# Patient Record
Sex: FEMALE | Race: WHITE | NOT HISPANIC OR LATINO | Employment: OTHER | URBAN - METROPOLITAN AREA
[De-identification: names, ages, dates, MRNs, and addresses within clinical notes are randomized per-mention and may not be internally consistent; named-entity substitution may affect disease eponyms.]

---

## 2024-06-19 ENCOUNTER — APPOINTMENT (OUTPATIENT)
Dept: CT IMAGING | Facility: HOSPITAL | Age: 89
DRG: 312 | End: 2024-06-19
Payer: MEDICARE

## 2024-06-19 ENCOUNTER — HOSPITAL ENCOUNTER (INPATIENT)
Facility: HOSPITAL | Age: 89
LOS: 1 days | Discharge: HOME HEALTH | DRG: 312 | End: 2024-06-22
Attending: EMERGENCY MEDICINE | Admitting: INTERNAL MEDICINE
Payer: MEDICARE

## 2024-06-19 DIAGNOSIS — Z86.59 HISTORY OF DEMENTIA: ICD-10-CM

## 2024-06-19 DIAGNOSIS — F43.10 PTSD (POST-TRAUMATIC STRESS DISORDER): ICD-10-CM

## 2024-06-19 DIAGNOSIS — Z79.01 CHRONIC ANTICOAGULATION: ICD-10-CM

## 2024-06-19 DIAGNOSIS — R55 SYNCOPE, UNSPECIFIED SYNCOPE TYPE: Primary | ICD-10-CM

## 2024-06-19 DIAGNOSIS — R06.83 SNORING: ICD-10-CM

## 2024-06-19 DIAGNOSIS — F60.81 NARCISSISTIC PERSONALITY DISORDER: ICD-10-CM

## 2024-06-19 DIAGNOSIS — Z86.79 HISTORY OF HYPERTENSION: ICD-10-CM

## 2024-06-19 DIAGNOSIS — R68.89 FORGETFULNESS: ICD-10-CM

## 2024-06-19 DIAGNOSIS — Z86.79 HISTORY OF ATRIAL FIBRILLATION: ICD-10-CM

## 2024-06-19 LAB
ALBUMIN SERPL-MCNC: 4 G/DL (ref 3.5–5.2)
ALBUMIN/GLOB SERPL: 1.6 G/DL
ALP SERPL-CCNC: 66 U/L (ref 39–117)
ALT SERPL W P-5'-P-CCNC: 14 U/L (ref 1–33)
ANION GAP SERPL CALCULATED.3IONS-SCNC: 11 MMOL/L (ref 5–15)
AST SERPL-CCNC: 16 U/L (ref 1–32)
BASOPHILS # BLD AUTO: 0.02 10*3/MM3 (ref 0–0.2)
BASOPHILS NFR BLD AUTO: 0.3 % (ref 0–1.5)
BILIRUB SERPL-MCNC: 0.3 MG/DL (ref 0–1.2)
BUN SERPL-MCNC: 30 MG/DL (ref 8–23)
BUN/CREAT SERPL: 35.7 (ref 7–25)
CALCIUM SPEC-SCNC: 9.3 MG/DL (ref 8.6–10.5)
CHLORIDE SERPL-SCNC: 102 MMOL/L (ref 98–107)
CO2 SERPL-SCNC: 26 MMOL/L (ref 22–29)
CREAT SERPL-MCNC: 0.84 MG/DL (ref 0.57–1)
D DIMER PPP FEU-MCNC: <0.27 MCGFEU/ML (ref 0–0.86)
D-LACTATE SERPL-SCNC: 1.8 MMOL/L (ref 0.5–2)
DEPRECATED RDW RBC AUTO: 47.8 FL (ref 37–54)
EGFRCR SERPLBLD CKD-EPI 2021: 67.8 ML/MIN/1.73
EOSINOPHIL # BLD AUTO: 0.06 10*3/MM3 (ref 0–0.4)
EOSINOPHIL NFR BLD AUTO: 0.8 % (ref 0.3–6.2)
ERYTHROCYTE [DISTWIDTH] IN BLOOD BY AUTOMATED COUNT: 13 % (ref 12.3–15.4)
GLOBULIN UR ELPH-MCNC: 2.5 GM/DL
GLUCOSE SERPL-MCNC: 147 MG/DL (ref 65–99)
HCT VFR BLD AUTO: 39.5 % (ref 34–46.6)
HGB BLD-MCNC: 12.8 G/DL (ref 12–15.9)
HOLD SPECIMEN: NORMAL
IMM GRANULOCYTES # BLD AUTO: 0.01 10*3/MM3 (ref 0–0.05)
IMM GRANULOCYTES NFR BLD AUTO: 0.1 % (ref 0–0.5)
LYMPHOCYTES # BLD AUTO: 0.88 10*3/MM3 (ref 0.7–3.1)
LYMPHOCYTES NFR BLD AUTO: 12.1 % (ref 19.6–45.3)
MAGNESIUM SERPL-MCNC: 2.1 MG/DL (ref 1.6–2.4)
MCH RBC QN AUTO: 32.2 PG (ref 26.6–33)
MCHC RBC AUTO-ENTMCNC: 32.4 G/DL (ref 31.5–35.7)
MCV RBC AUTO: 99.2 FL (ref 79–97)
MONOCYTES # BLD AUTO: 0.51 10*3/MM3 (ref 0.1–0.9)
MONOCYTES NFR BLD AUTO: 7 % (ref 5–12)
NEUTROPHILS NFR BLD AUTO: 5.81 10*3/MM3 (ref 1.7–7)
NEUTROPHILS NFR BLD AUTO: 79.7 % (ref 42.7–76)
NRBC BLD AUTO-RTO: 0 /100 WBC (ref 0–0.2)
PLATELET # BLD AUTO: 173 10*3/MM3 (ref 140–450)
PMV BLD AUTO: 10 FL (ref 6–12)
POTASSIUM SERPL-SCNC: 4.5 MMOL/L (ref 3.5–5.2)
PROT SERPL-MCNC: 6.5 G/DL (ref 6–8.5)
RBC # BLD AUTO: 3.98 10*6/MM3 (ref 3.77–5.28)
SODIUM SERPL-SCNC: 139 MMOL/L (ref 136–145)
TROPONIN T SERPL HS-MCNC: 16 NG/L
TROPONIN T SERPL HS-MCNC: 19 NG/L
WBC NRBC COR # BLD AUTO: 7.29 10*3/MM3 (ref 3.4–10.8)
WHOLE BLOOD HOLD COAG: NORMAL
WHOLE BLOOD HOLD SPECIMEN: NORMAL

## 2024-06-19 PROCEDURE — 83735 ASSAY OF MAGNESIUM: CPT | Performed by: EMERGENCY MEDICINE

## 2024-06-19 PROCEDURE — 83605 ASSAY OF LACTIC ACID: CPT | Performed by: PHYSICIAN ASSISTANT

## 2024-06-19 PROCEDURE — 93005 ELECTROCARDIOGRAM TRACING: CPT | Performed by: EMERGENCY MEDICINE

## 2024-06-19 PROCEDURE — 36415 COLL VENOUS BLD VENIPUNCTURE: CPT

## 2024-06-19 PROCEDURE — 85379 FIBRIN DEGRADATION QUANT: CPT | Performed by: PHYSICIAN ASSISTANT

## 2024-06-19 PROCEDURE — 85025 COMPLETE CBC W/AUTO DIFF WBC: CPT | Performed by: EMERGENCY MEDICINE

## 2024-06-19 PROCEDURE — 82550 ASSAY OF CK (CPK): CPT | Performed by: NURSE PRACTITIONER

## 2024-06-19 PROCEDURE — 83036 HEMOGLOBIN GLYCOSYLATED A1C: CPT | Performed by: NURSE PRACTITIONER

## 2024-06-19 PROCEDURE — 84484 ASSAY OF TROPONIN QUANT: CPT | Performed by: PHYSICIAN ASSISTANT

## 2024-06-19 PROCEDURE — 25810000003 SODIUM CHLORIDE 0.9 % SOLUTION: Performed by: PHYSICIAN ASSISTANT

## 2024-06-19 PROCEDURE — 93005 ELECTROCARDIOGRAM TRACING: CPT | Performed by: PHYSICIAN ASSISTANT

## 2024-06-19 PROCEDURE — 99285 EMERGENCY DEPT VISIT HI MDM: CPT

## 2024-06-19 PROCEDURE — 84443 ASSAY THYROID STIM HORMONE: CPT | Performed by: NURSE PRACTITIONER

## 2024-06-19 PROCEDURE — 70450 CT HEAD/BRAIN W/O DYE: CPT

## 2024-06-19 PROCEDURE — 84484 ASSAY OF TROPONIN QUANT: CPT | Performed by: EMERGENCY MEDICINE

## 2024-06-19 PROCEDURE — 80053 COMPREHEN METABOLIC PANEL: CPT | Performed by: EMERGENCY MEDICINE

## 2024-06-19 RX ORDER — SODIUM CHLORIDE 0.9 % (FLUSH) 0.9 %
10 SYRINGE (ML) INJECTION AS NEEDED
Status: DISCONTINUED | OUTPATIENT
Start: 2024-06-19 | End: 2024-06-22 | Stop reason: HOSPADM

## 2024-06-19 RX ADMIN — SODIUM CHLORIDE 1000 ML: 900 INJECTION, SOLUTION INTRAVENOUS at 19:35

## 2024-06-19 NOTE — ED PROVIDER NOTES
EMERGENCY DEPARTMENT ENCOUNTER    Pt Name: Billie Yu  MRN: [id number]  Pt :   1935  Room Number:  S312/1  Date of encounter:  2024  PCP: Provider, No Known  ED Provider: SHEA Pagan    Historian: Patient    HPI:  Chief Complaint: Syncope    Context: Billie Yu is a 88 y.o. female who presents to the ED c/o a syncopal episode.  Patient reports that she was enjoying a nice dinner this evening and experienced a syncopal episode.  Son was present at bedside reports that he was eating dinner with his mother when he looked over and she began shaking.  As he got up patient started to look as if she was going to pass out and he was able to get her and eased her down to the floor.  He notes that she passed out for approximately 5 minutes.  Patient denies any recent symptoms other than feeling tired lately.  She has just recently moved here from [place] and has been working to try and get her house set up.  Patient lives by herself.  Patient has history of dementia in addition to atrial fibrillation.  Son states that he is unsure that she has been compliant with her medications as a result of her dementia.  Son reports that a similar situation happened approximately 2 weeks prior when it was found that patient was in atrial fibrillation.  On interview and exam patient without complaints.  HPI     REVIEW OF SYSTEMS  A chief complaint appropriate review of systems was completed and is negative except as noted in the HPI.     PAST MEDICAL HISTORY  History reviewed. No pertinent past medical history.    PAST SURGICAL HISTORY  History reviewed. No pertinent surgical history.    FAMILY HISTORY  History reviewed. No pertinent family history.    SOCIAL HISTORY  Social History     Socioeconomic History    Marital status:    Tobacco Use    Smoking status: Never    Smokeless tobacco: Never   Vaping Use    Vaping status: Never Used   Substance and Sexual Activity    Drug use: Never      ALLERGIES  Patient has no known allergies.    PHYSICAL EXAM  Physical Exam  GENERAL:   Appears in no acute distress.  HENT: Nares patent.  EYES: No scleral icterus.  CV: Regular rhythm, regular rate.  RESPIRATORY: Normal effort.  No audible wheezes, rales or rhonchi.  ABDOMEN: Soft, nontender  MUSCULOSKELETAL: No deformities.   NEURO: Alert, moves all extremities, follows commands. No focal deficits.   SKIN: Warm, dry, no rash visualized.  PSYCH: At baseline   I have reviewed the triage vital signs and nursing notes.     LAB RESULTS  Results for orders placed or performed during the hospital encounter of 06/19/24   Comprehensive Metabolic Panel    Specimen: Blood   Result Value Ref Range    Glucose 147 (H) 65 - 99 mg/dL    BUN 30 (H) 8 - 23 mg/dL    Creatinine 0.84 0.57 - 1.00 mg/dL    Sodium 139 136 - 145 mmol/L    Potassium 4.5 3.5 - 5.2 mmol/L    Chloride 102 98 - 107 mmol/L    CO2 26.0 22.0 - 29.0 mmol/L    Calcium 9.3 8.6 - 10.5 mg/dL    Total Protein 6.5 6.0 - 8.5 g/dL    Albumin 4.0 3.5 - 5.2 g/dL    ALT (SGPT) 14 1 - 33 U/L    AST (SGOT) 16 1 - 32 U/L    Alkaline Phosphatase 66 39 - 117 U/L    Total Bilirubin 0.3 0.0 - 1.2 mg/dL    Globulin 2.5 gm/dL    A/G Ratio 1.6 g/dL    BUN/Creatinine Ratio 35.7 (H) 7.0 - 25.0    Anion Gap 11.0 5.0 - 15.0 mmol/L    eGFR 67.8 >60.0 mL/min/1.73   Magnesium    Specimen: Blood   Result Value Ref Range    Magnesium 2.1 1.6 - 2.4 mg/dL   Single High Sensitivity Troponin T    Specimen: Blood   Result Value Ref Range    HS Troponin T 19 (H) <14 ng/L   CBC Auto Differential    Specimen: Blood   Result Value Ref Range    WBC 7.29 3.40 - 10.80 10*3/mm3    RBC 3.98 3.77 - 5.28 10*6/mm3    Hemoglobin 12.8 12.0 - 15.9 g/dL    Hematocrit 39.5 34.0 - 46.6 %    MCV 99.2 (H) 79.0 - 97.0 fL    MCH 32.2 26.6 - 33.0 pg    MCHC 32.4 31.5 - 35.7 g/dL    RDW 13.0 12.3 - 15.4 %    RDW-SD 47.8 37.0 - 54.0 fl    MPV 10.0 6.0 - 12.0 fL    Platelets 173 140 - 450 10*3/mm3    Neutrophil % 79.7  (H) 42.7 - 76.0 %    Lymphocyte % 12.1 (L) 19.6 - 45.3 %    Monocyte % 7.0 5.0 - 12.0 %    Eosinophil % 0.8 0.3 - 6.2 %    Basophil % 0.3 0.0 - 1.5 %    Immature Grans % 0.1 0.0 - 0.5 %    Neutrophils, Absolute 5.81 1.70 - 7.00 10*3/mm3    Lymphocytes, Absolute 0.88 0.70 - 3.10 10*3/mm3    Monocytes, Absolute 0.51 0.10 - 0.90 10*3/mm3    Eosinophils, Absolute 0.06 0.00 - 0.40 10*3/mm3    Basophils, Absolute 0.02 0.00 - 0.20 10*3/mm3    Immature Grans, Absolute 0.01 0.00 - 0.05 10*3/mm3    nRBC 0.0 0.0 - 0.2 /100 WBC   D-dimer, Quantitative    Specimen: Blood   Result Value Ref Range    D-Dimer, Quantitative <0.27 0.00 - 0.86 MCGFEU/mL   Lactic Acid, Plasma    Specimen: Blood   Result Value Ref Range    Lactate 1.8 0.5 - 2.0 mmol/L   Single High Sensitivity Troponin T    Specimen: Blood   Result Value Ref Range    HS Troponin T 16 (H) <14 ng/L   CK    Specimen: Blood   Result Value Ref Range    Creatine Kinase 80 20 - 180 U/L   TSH Rfx On Abnormal To Free T4    Specimen: Blood   Result Value Ref Range    TSH 1.150 0.270 - 4.200 uIU/mL   ECG 12 Lead ED Triage Standing Order; Syncope   Result Value Ref Range    QT Interval 426 ms    QTC Interval 469 ms   ECG 12 Lead Syncope   Result Value Ref Range    QT Interval 450 ms    QTC Interval 492 ms   Green Top (Gel)   Result Value Ref Range    Extra Tube Hold for add-ons.    Lavender Top   Result Value Ref Range    Extra Tube hold for add-on    Gold Top - SST   Result Value Ref Range    Extra Tube Hold for add-ons.    Gray Top   Result Value Ref Range    Extra Tube Hold for add-ons.    Light Blue Top   Result Value Ref Range    Extra Tube Hold for add-ons.        If labs were ordered, I independently reviewed the results and considered them in treating the patient.    RADIOLOGY  CT Head Without Contrast   Final Result   Impression:      No acute intracranial process evident.      Age-related involutional changes and findings compatible with changes of chronic microvascular  ischemia.            Electronically Signed: Dmitry Patricio MD     6/19/2024 8:17 PM EDT     Workstation ID: [id number]      MRI Brain With & Without Contrast    (Results Pending)     [x] Radiologist's Report Reviewed:  I ordered and independently interpreted the above noted radiographic studies.  See radiologist's dictation for official interpretation.      PROCEDURES    Procedures    ECG 12 Lead Syncope   Preliminary Result   Test Reason : Syncope   Blood Pressure :   */*   mmHG   Vent. Rate :  72 BPM     Atrial Rate :  72 BPM      P-R Int : 164 ms          QRS Dur : 122 ms       QT Int : 450 ms       P-R-T Axes :  62 -42  24 degrees      QTc Int : 492 ms      Normal sinus rhythm   Possible Left atrial enlargement   Left axis deviation   Left ventricular hypertrophy with QRS widening   Nonspecific ST and T wave abnormality   Abnormal ECG   When compared with ECG of 19-JUN-2024 19:05, (Unconfirmed)   Minimal criteria for Septal infarct are no longer present      Referred By: edmd           Confirmed By:       ECG 12 Lead ED Triage Standing Order; Syncope   Preliminary Result   Test Reason : ED Triage Standing Order~   Blood Pressure :   */*   mmHG   Vent. Rate :  73 BPM     Atrial Rate :  73 BPM      P-R Int : 164 ms          QRS Dur : 118 ms       QT Int : 426 ms       P-R-T Axes :  63 -47  49 degrees      QTc Int : 469 ms      Normal sinus rhythm   Possible Left atrial enlargement   Left anterior fascicular block   Left ventricular hypertrophy with QRS widening   Cannot rule out Septal infarct , age undetermined   Abnormal ECG   No previous ECGs available      Referred By: edmd           Confirmed By:           MEDICATIONS GIVEN IN ER    Medications   sodium chloride 0.9 % flush 10 mL (has no administration in time range)   amiodarone (PACERONE) tablet 200 mg (has no administration in time range)   apixaban (ELIQUIS) tablet 2.5 mg (has no administration in time range)   sodium chloride 0.9 % flush 10 mL (has no  administration in time range)   sodium chloride 0.9 % flush 10 mL (has no administration in time range)   sodium chloride 0.9 % infusion 40 mL (has no administration in time range)   nitroglycerin (NITROSTAT) SL tablet 0.4 mg (has no administration in time range)   acetaminophen (TYLENOL) tablet 650 mg (has no administration in time range)     Or   acetaminophen (TYLENOL) suppository 650 mg (has no administration in time range)   sennosides-docusate (PERICOLACE) 8.6-50 MG per tablet 2 tablet (has no administration in time range)     And   polyethylene glycol (MIRALAX) packet 17 g (has no administration in time range)     And   bisacodyl (DULCOLAX) EC tablet 5 mg (has no administration in time range)     And   bisacodyl (DULCOLAX) suppository 10 mg (has no administration in time range)   melatonin tablet 5 mg (has no administration in time range)   ondansetron ODT (ZOFRAN-ODT) disintegrating tablet 4 mg (has no administration in time range)     Or   ondansetron (ZOFRAN) injection 4 mg (has no administration in time range)   famotidine (PEPCID) tablet 40 mg (has no administration in time range)   sodium chloride 0.9 % bolus 1,000 mL (0 mL Intravenous Stopped 6/19/24 2049)       MEDICAL DECISION MAKING, PROGRESS, and CONSULTS   Medical Decision Making  88-year-old nontoxic-appearing female presents the ED following syncopal episode.  History of multiple syncopal episodes over the last year.  Recently moved to this area from [place].  Reported history of hypertension, dementia and atrial fibrillation anticoagulated on Eliquis.  No acute emergent findings demonstrated on physical exam.  Labs without acute abnormalities.  EKG without evidence of acute findings.  CT head without evidence of acute intracranial abnormalities.  Patient admitted to hospital medicine for further evaluation and treatment of her syncopal episodes.    Problems Addressed:  Syncope, unspecified syncope type: complicated acute illness or  injury    Amount and/or Complexity of Data Reviewed  Independent Historian: caregiver     Details: Son who is medical and legal power of  at bedside contributory to HPI and review of systems  Labs: ordered. Decision-making details documented in ED Course.  Radiology: ordered and independent interpretation performed. Decision-making details documented in ED Course.  ECG/medicine tests: ordered and independent interpretation performed. Decision-making details documented in ED Course.    Risk  Prescription drug management.  Decision regarding hospitalization.      All labs have been independently reviewed by me.  All radiology studies have been interpreted by me and the radiologist dictating the report.  All EKG's have been independently interpreted by me as well as and overseeing attending physician.    [] Discussed with radiology regarding test interpretation:    Discussion below represents my analysis of pertinent findings related to patient's condition, differential diagnosis, treatment plan and final disposition.    Differential diagnosis:  The differential diagnosis associated with the patient's presentation includes: Vasovagal syncope, orthostatic hypotension, hyperventilation, anemia, arrhythmias, hypoglycemia, seizure, PE, myocardial infarction, hypoxia, CVA, TIA.     Additional sources  Discussed/ obtained information from independent historians:   [] Spouse  [] Parent  [x] Family member  [] Friend  [] EMS   [] Other:  External (non-ED) record review:   [] Inpatient record:   [] Office record:   [] Outpatient record:   [] Prior Outpatient labs:   [] Prior Outpatient radiology:   [] Primary Care record:   [] Outside ED record:   [] Other:   Patient's care impacted by:   [] Diabetes  [x] Hypertension  [] Hyperlipidemia  [] Hypothyroidism   [] Coronary Artery Disease   [] COPD   [] Cancer   [] Obesity  [] GERD   [] Tobacco Abuse   [] Substance Abuse    [] Anxiety   [] Depression   [x] Other: History of  dementia, history atrial fibrillation, history of hypertension  Care significantly affected by Social Determinants of Health (housing and economic circumstances, unemployment)    [] Yes     [x] No   If yes, Patient's care significantly limited by  Social Determinants of Health including:   [] Inadequate housing   [] Low income   [] Alcoholism and drug addiction in family   [] Problems related to primary support group   [] Unemployment   [] Problems related to employment   [] Other Social Determinants of Health:     Shared decision making:  I reviewed workup performed in ED including labs and imaging. Based on findings, recommendation made for admission. Patient is agreeable to plan of care and hospital admission.      Orders placed during this visit:  Orders Placed This Encounter   Procedures    Respiratory Panel PCR w/COVID-19(SARS-CoV-2) ANN/[facility] In-House, NP Swab in UTM/VTM, 2 HR TAT - Swab, Nasopharynx    CT Head Without Contrast    MRI Brain With & Without Contrast    [place] Draw    Comprehensive Metabolic Panel    Magnesium    Single High Sensitivity Troponin T    CBC Auto Differential    D-dimer, Quantitative    Lactic Acid, Plasma    Single High Sensitivity Troponin T    CK    Basic Metabolic Panel    CBC Auto Differential    Phosphorus    TSH Rfx On Abnormal To Free T4    Hemoglobin A1c    Urine Drug Screen - Urine, Clean Catch    Urinalysis With Culture If Indicated -    Diet: Cardiac; Healthy Heart (2-3 Na+); Fluid Consistency: Thin (IDDSI 0)    Undress & Gown    Vital Signs    Orthostatic Blood Pressure    Intake & Output    Weigh Patient    Maintain IV Access    Telemetry - Place Orders & Notify Provider of Results When Patient Experiences Acute Chest Pain, Dysrhythmia or Respiratory Distress    May Be Off Telemetry for Tests    Vital Signs    Orthostatic Blood Pressure    Continuous Pulse Oximetry    Up With Assistance    Daily Weights    Strict Intake & Output    Neuro Checks    Follow  Standard Precautions    Oral Care    Code Status and Medical Interventions:    Inpatient Neurology Consult General    Inpatient Cardiology Consult    OT Consult: Eval & Treat Discharge Placement Assessment, ADL Performance Below Baseline    PT Consult: Eval & Treat Functional Mobility Below Baseline, Discharge Placement Assessment    Oxygen Therapy- Nasal Cannula; Titrate 1-6 LPM Per SpO2; 90 - 95%    Incentive Spirometry    Oxygen Therapy- Nasal Cannula; Titrate 1-6 LPM Per SpO2; 90 - 95%    ECG 12 Lead ED Triage Standing Order; Syncope    ECG 12 Lead Syncope    Adult Transthoracic Echo Complete With Contrast if Necessary Per Protocol    Insert Peripheral IV    Insert Peripheral IV    Initiate Observation Status    ED Bed Request    Seizure Precautions    Fall Precautions    Aspiration Precautions    CBC & Differential    Green Top (Gel)    Lavender Top    Gold Top - SST    Gray Top    Light Blue Top     ED Course:    ED Course as of 06/20/24 0222 Wed Jun 19, 2024 1931 Vitals and Telemetry tracing was reviewed and directly interpreted by myself demonstrating blood sugar 157/72, afebrile, heart rate of 72, respirations 18 breaths/min and oxygen saturation 93% on room air [JG]   1931 BP: 157/72 [JG]   1931 Temp: 97.8 °F (36.6 °C) [JG]   1931 Heart Rate: 72 [JG]   1931 Resp: 18 [JG]   1931 SpO2: 93 % [JG]   1932 EKG personally interpreted by myself in addition to interpretation by attending without evidence of acute ischemic changes; normal sinus rhythm [JG]   2038 CT head personally interpreted by myself and with official read provided by radiology demonstrates no acute intracranial abnormalities [JG]   2135 Orthostatic vital signs personally interpreted by myself; patient is not orthostatic [JG]   2203 Hospital medicine Dr. Guzman messaged for admission [JG]   2225 [facility] medicine, Dr. Guzman agreeable to accept patient for admission [JG]      ED Course User Index  [JG] Almas Larios, PA           DIAGNOSIS  Final diagnoses:   Syncope, unspecified syncope type   History of atrial fibrillation   History of hypertension   History of dementia     DISPOSITION    ED Disposition       ED Disposition   Decision to Admit    Condition   --    Comment   Level of Care: Telemetry [5]   Diagnosis: Syncope [323042]   Admitting Physician: MARIAN CHAPMAN [[id number]]   Attending Physician: MARIAN CHAPMAN [[id number]]                 Please note that portions of this document were completed with voice recognition software.        Almas Larios, PA  06/20/24 0222

## 2024-06-19 NOTE — Clinical Note
Level of Care: Telemetry [5]   Diagnosis: Syncope [206001]   Admitting Physician: MARIAN CHAPMAN [[id number]]   Attending Physician: MARIAN CHAPMAN [[id number]]

## 2024-06-20 ENCOUNTER — APPOINTMENT (OUTPATIENT)
Dept: NEUROLOGY | Facility: HOSPITAL | Age: 89
DRG: 312 | End: 2024-06-20
Payer: MEDICARE

## 2024-06-20 ENCOUNTER — APPOINTMENT (OUTPATIENT)
Dept: MRI IMAGING | Facility: HOSPITAL | Age: 89
DRG: 312 | End: 2024-06-20
Payer: MEDICARE

## 2024-06-20 ENCOUNTER — APPOINTMENT (OUTPATIENT)
Dept: CARDIOLOGY | Facility: HOSPITAL | Age: 89
DRG: 312 | End: 2024-06-20
Payer: MEDICARE

## 2024-06-20 PROBLEM — F43.10 PTSD (POST-TRAUMATIC STRESS DISORDER): Status: ACTIVE | Noted: 2024-06-20

## 2024-06-20 PROBLEM — R06.83 SNORING: Status: ACTIVE | Noted: 2024-06-20

## 2024-06-20 PROBLEM — R68.89 FORGETFULNESS: Status: ACTIVE | Noted: 2024-06-20

## 2024-06-20 PROBLEM — F60.81 NARCISSISTIC PERSONALITY DISORDER: Status: ACTIVE | Noted: 2024-06-20

## 2024-06-20 PROBLEM — R55 SYNCOPE: Status: ACTIVE | Noted: 2024-06-20

## 2024-06-20 PROBLEM — I48.91 A-FIB: Status: ACTIVE | Noted: 2024-06-20

## 2024-06-20 PROBLEM — Z79.01 CHRONIC ANTICOAGULATION: Status: ACTIVE | Noted: 2024-06-20

## 2024-06-20 LAB
ANION GAP SERPL CALCULATED.3IONS-SCNC: 8 MMOL/L (ref 5–15)
B PARAPERT DNA SPEC QL NAA+PROBE: NOT DETECTED
B PERT DNA SPEC QL NAA+PROBE: NOT DETECTED
BASOPHILS # BLD AUTO: 0.03 10*3/MM3 (ref 0–0.2)
BASOPHILS NFR BLD AUTO: 0.5 % (ref 0–1.5)
BILIRUB UR QL STRIP: NEGATIVE
BUN SERPL-MCNC: 26 MG/DL (ref 8–23)
BUN/CREAT SERPL: 43.3 (ref 7–25)
C PNEUM DNA NPH QL NAA+NON-PROBE: NOT DETECTED
CALCIUM SPEC-SCNC: 8.4 MG/DL (ref 8.6–10.5)
CHLORIDE SERPL-SCNC: 106 MMOL/L (ref 98–107)
CK SERPL-CCNC: 80 U/L (ref 20–180)
CLARITY UR: CLEAR
CO2 SERPL-SCNC: 27 MMOL/L (ref 22–29)
COLOR UR: YELLOW
CREAT SERPL-MCNC: 0.6 MG/DL (ref 0.57–1)
DEPRECATED RDW RBC AUTO: 46.8 FL (ref 37–54)
EGFRCR SERPLBLD CKD-EPI 2021: 87.5 ML/MIN/1.73
EOSINOPHIL # BLD AUTO: 0.08 10*3/MM3 (ref 0–0.4)
EOSINOPHIL NFR BLD AUTO: 1.5 % (ref 0.3–6.2)
ERYTHROCYTE [DISTWIDTH] IN BLOOD BY AUTOMATED COUNT: 13 % (ref 12.3–15.4)
FLUAV SUBTYP SPEC NAA+PROBE: NOT DETECTED
FLUBV RNA ISLT QL NAA+PROBE: NOT DETECTED
GLUCOSE SERPL-MCNC: 92 MG/DL (ref 65–99)
GLUCOSE UR STRIP-MCNC: NEGATIVE MG/DL
HADV DNA SPEC NAA+PROBE: NOT DETECTED
HBA1C MFR BLD: 5.6 % (ref 4.8–5.6)
HCOV 229E RNA SPEC QL NAA+PROBE: NOT DETECTED
HCOV HKU1 RNA SPEC QL NAA+PROBE: NOT DETECTED
HCOV NL63 RNA SPEC QL NAA+PROBE: NOT DETECTED
HCOV OC43 RNA SPEC QL NAA+PROBE: NOT DETECTED
HCT VFR BLD AUTO: 33.5 % (ref 34–46.6)
HGB BLD-MCNC: 10.7 G/DL (ref 12–15.9)
HGB UR QL STRIP.AUTO: NEGATIVE
HMPV RNA NPH QL NAA+NON-PROBE: NOT DETECTED
HPIV1 RNA ISLT QL NAA+PROBE: NOT DETECTED
HPIV2 RNA SPEC QL NAA+PROBE: NOT DETECTED
HPIV3 RNA NPH QL NAA+PROBE: NOT DETECTED
HPIV4 P GENE NPH QL NAA+PROBE: NOT DETECTED
IMM GRANULOCYTES # BLD AUTO: 0.01 10*3/MM3 (ref 0–0.05)
IMM GRANULOCYTES NFR BLD AUTO: 0.2 % (ref 0–0.5)
KETONES UR QL STRIP: NEGATIVE
LEUKOCYTE ESTERASE UR QL STRIP.AUTO: NEGATIVE
LYMPHOCYTES # BLD AUTO: 0.85 10*3/MM3 (ref 0.7–3.1)
LYMPHOCYTES NFR BLD AUTO: 15.4 % (ref 19.6–45.3)
M PNEUMO IGG SER IA-ACNC: NOT DETECTED
MCH RBC QN AUTO: 31.3 PG (ref 26.6–33)
MCHC RBC AUTO-ENTMCNC: 31.9 G/DL (ref 31.5–35.7)
MCV RBC AUTO: 98 FL (ref 79–97)
MONOCYTES # BLD AUTO: 0.41 10*3/MM3 (ref 0.1–0.9)
MONOCYTES NFR BLD AUTO: 7.4 % (ref 5–12)
NEUTROPHILS NFR BLD AUTO: 4.13 10*3/MM3 (ref 1.7–7)
NEUTROPHILS NFR BLD AUTO: 75 % (ref 42.7–76)
NITRITE UR QL STRIP: NEGATIVE
NRBC BLD AUTO-RTO: 0 /100 WBC (ref 0–0.2)
PH UR STRIP.AUTO: 6.5 [PH] (ref 5–8)
PHOSPHATE SERPL-MCNC: 2.9 MG/DL (ref 2.5–4.5)
PLATELET # BLD AUTO: 170 10*3/MM3 (ref 140–450)
PMV BLD AUTO: 10.4 FL (ref 6–12)
POTASSIUM SERPL-SCNC: 4.4 MMOL/L (ref 3.5–5.2)
PROT UR QL STRIP: NEGATIVE
RBC # BLD AUTO: 3.42 10*6/MM3 (ref 3.77–5.28)
RHINOVIRUS RNA SPEC NAA+PROBE: NOT DETECTED
RSV RNA NPH QL NAA+NON-PROBE: NOT DETECTED
SARS-COV-2 RNA NPH QL NAA+NON-PROBE: NOT DETECTED
SODIUM SERPL-SCNC: 141 MMOL/L (ref 136–145)
SP GR UR STRIP: 1.02 (ref 1–1.03)
TSH SERPL DL<=0.05 MIU/L-ACNC: 1.15 UIU/ML (ref 0.27–4.2)
UROBILINOGEN UR QL STRIP: NORMAL
WBC NRBC COR # BLD AUTO: 5.51 10*3/MM3 (ref 3.4–10.8)

## 2024-06-20 PROCEDURE — 99232 SBSQ HOSP IP/OBS MODERATE 35: CPT | Performed by: INTERNAL MEDICINE

## 2024-06-20 PROCEDURE — 93010 ELECTROCARDIOGRAM REPORT: CPT | Performed by: INTERNAL MEDICINE

## 2024-06-20 PROCEDURE — 99204 OFFICE O/P NEW MOD 45 MIN: CPT | Performed by: INTERNAL MEDICINE

## 2024-06-20 PROCEDURE — 95713 VEEG 2-12 HR CONT MNTR: CPT

## 2024-06-20 PROCEDURE — 85025 COMPLETE CBC W/AUTO DIFF WBC: CPT | Performed by: NURSE PRACTITIONER

## 2024-06-20 PROCEDURE — 99222 1ST HOSP IP/OBS MODERATE 55: CPT | Performed by: NURSE PRACTITIONER

## 2024-06-20 PROCEDURE — G0378 HOSPITAL OBSERVATION PER HR: HCPCS

## 2024-06-20 PROCEDURE — 93306 TTE W/DOPPLER COMPLETE: CPT | Performed by: INTERNAL MEDICINE

## 2024-06-20 PROCEDURE — 99223 1ST HOSP IP/OBS HIGH 75: CPT | Performed by: PSYCHIATRY & NEUROLOGY

## 2024-06-20 PROCEDURE — 97166 OT EVAL MOD COMPLEX 45 MIN: CPT

## 2024-06-20 PROCEDURE — 81003 URINALYSIS AUTO W/O SCOPE: CPT | Performed by: NURSE PRACTITIONER

## 2024-06-20 PROCEDURE — 95813 EEG EXTND MNTR 61-119 MIN: CPT | Performed by: PSYCHIATRY & NEUROLOGY

## 2024-06-20 PROCEDURE — 95819 EEG AWAKE AND ASLEEP: CPT

## 2024-06-20 PROCEDURE — 97535 SELF CARE MNGMENT TRAINING: CPT

## 2024-06-20 PROCEDURE — 80048 BASIC METABOLIC PNL TOTAL CA: CPT | Performed by: NURSE PRACTITIONER

## 2024-06-20 PROCEDURE — 84100 ASSAY OF PHOSPHORUS: CPT | Performed by: NURSE PRACTITIONER

## 2024-06-20 PROCEDURE — 0202U NFCT DS 22 TRGT SARS-COV-2: CPT | Performed by: NURSE PRACTITIONER

## 2024-06-20 PROCEDURE — 97161 PT EVAL LOW COMPLEX 20 MIN: CPT

## 2024-06-20 PROCEDURE — 93005 ELECTROCARDIOGRAM TRACING: CPT | Performed by: INTERNAL MEDICINE

## 2024-06-20 PROCEDURE — 93306 TTE W/DOPPLER COMPLETE: CPT

## 2024-06-20 PROCEDURE — 95813 EEG EXTND MNTR 61-119 MIN: CPT

## 2024-06-20 RX ORDER — LISINOPRIL 10 MG/1
10 TABLET ORAL DAILY
Status: DISCONTINUED | OUTPATIENT
Start: 2024-06-20 | End: 2024-06-22 | Stop reason: HOSPADM

## 2024-06-20 RX ORDER — ACETAMINOPHEN 650 MG/1
650 SUPPOSITORY RECTAL EVERY 4 HOURS PRN
Status: DISCONTINUED | OUTPATIENT
Start: 2024-06-20 | End: 2024-06-22 | Stop reason: HOSPADM

## 2024-06-20 RX ORDER — UREA 10 %
5 LOTION (ML) TOPICAL NIGHTLY PRN
Status: DISCONTINUED | OUTPATIENT
Start: 2024-06-20 | End: 2024-06-22 | Stop reason: HOSPADM

## 2024-06-20 RX ORDER — AMIODARONE HYDROCHLORIDE 200 MG/1
200 TABLET ORAL DAILY
Status: DISCONTINUED | OUTPATIENT
Start: 2024-06-20 | End: 2024-06-22 | Stop reason: HOSPADM

## 2024-06-20 RX ORDER — NITROGLYCERIN 0.4 MG/1
0.4 TABLET SUBLINGUAL
Status: DISCONTINUED | OUTPATIENT
Start: 2024-06-20 | End: 2024-06-22 | Stop reason: HOSPADM

## 2024-06-20 RX ORDER — AMIODARONE HYDROCHLORIDE 200 MG/1
200 TABLET ORAL DAILY
COMMUNITY
Start: 2024-04-09

## 2024-06-20 RX ORDER — SODIUM CHLORIDE 0.9 % (FLUSH) 0.9 %
10 SYRINGE (ML) INJECTION AS NEEDED
Status: DISCONTINUED | OUTPATIENT
Start: 2024-06-20 | End: 2024-06-22 | Stop reason: HOSPADM

## 2024-06-20 RX ORDER — POLYETHYLENE GLYCOL 3350 17 G/17G
17 POWDER, FOR SOLUTION ORAL DAILY PRN
Status: DISCONTINUED | OUTPATIENT
Start: 2024-06-20 | End: 2024-06-22 | Stop reason: HOSPADM

## 2024-06-20 RX ORDER — ONDANSETRON 4 MG/1
4 TABLET, ORALLY DISINTEGRATING ORAL EVERY 6 HOURS PRN
Status: DISCONTINUED | OUTPATIENT
Start: 2024-06-20 | End: 2024-06-22 | Stop reason: HOSPADM

## 2024-06-20 RX ORDER — ACETAMINOPHEN 325 MG/1
650 TABLET ORAL EVERY 4 HOURS PRN
Status: DISCONTINUED | OUTPATIENT
Start: 2024-06-20 | End: 2024-06-22 | Stop reason: HOSPADM

## 2024-06-20 RX ORDER — FAMOTIDINE 20 MG/1
20 TABLET, FILM COATED ORAL DAILY
Status: DISCONTINUED | OUTPATIENT
Start: 2024-06-20 | End: 2024-06-22 | Stop reason: HOSPADM

## 2024-06-20 RX ORDER — SPIRONOLACTONE 25 MG/1
25 TABLET ORAL DAILY
COMMUNITY
Start: 2024-04-09

## 2024-06-20 RX ORDER — BISACODYL 5 MG/1
5 TABLET, DELAYED RELEASE ORAL DAILY PRN
Status: DISCONTINUED | OUTPATIENT
Start: 2024-06-20 | End: 2024-06-22 | Stop reason: HOSPADM

## 2024-06-20 RX ORDER — PREDNISOLONE ACETATE 10 MG/ML
1 SUSPENSION/ DROPS OPHTHALMIC 4 TIMES DAILY
COMMUNITY
Start: 2024-05-13 | End: 2024-06-28

## 2024-06-20 RX ORDER — FAMOTIDINE 20 MG/1
40 TABLET, FILM COATED ORAL DAILY
Status: DISCONTINUED | OUTPATIENT
Start: 2024-06-20 | End: 2024-06-20

## 2024-06-20 RX ORDER — BROMFENAC SODIUM 0.7 MG/ML
SOLUTION/ DROPS OPHTHALMIC
Status: ON HOLD | COMMUNITY
Start: 2024-03-15 | End: 2024-06-20

## 2024-06-20 RX ORDER — ONDANSETRON 2 MG/ML
4 INJECTION INTRAMUSCULAR; INTRAVENOUS EVERY 6 HOURS PRN
Status: DISCONTINUED | OUTPATIENT
Start: 2024-06-20 | End: 2024-06-22 | Stop reason: HOSPADM

## 2024-06-20 RX ORDER — BISACODYL 10 MG
10 SUPPOSITORY, RECTAL RECTAL DAILY PRN
Status: DISCONTINUED | OUTPATIENT
Start: 2024-06-20 | End: 2024-06-22 | Stop reason: HOSPADM

## 2024-06-20 RX ORDER — FUROSEMIDE 20 MG/1
20 TABLET ORAL DAILY
COMMUNITY
Start: 2024-04-10

## 2024-06-20 RX ORDER — APIXABAN 2.5 MG/1
2.5 TABLET, FILM COATED ORAL EVERY 12 HOURS SCHEDULED
COMMUNITY
Start: 2024-06-07

## 2024-06-20 RX ORDER — SODIUM CHLORIDE 9 MG/ML
40 INJECTION, SOLUTION INTRAVENOUS AS NEEDED
Status: DISCONTINUED | OUTPATIENT
Start: 2024-06-20 | End: 2024-06-22 | Stop reason: HOSPADM

## 2024-06-20 RX ORDER — LISINOPRIL 10 MG/1
10 TABLET ORAL DAILY
COMMUNITY

## 2024-06-20 RX ORDER — AMOXICILLIN 250 MG
2 CAPSULE ORAL 2 TIMES DAILY PRN
Status: DISCONTINUED | OUTPATIENT
Start: 2024-06-20 | End: 2024-06-22 | Stop reason: HOSPADM

## 2024-06-20 RX ORDER — SODIUM CHLORIDE 0.9 % (FLUSH) 0.9 %
10 SYRINGE (ML) INJECTION EVERY 12 HOURS SCHEDULED
Status: DISCONTINUED | OUTPATIENT
Start: 2024-06-20 | End: 2024-06-22 | Stop reason: HOSPADM

## 2024-06-20 RX ADMIN — Medication 10 ML: at 02:32

## 2024-06-20 RX ADMIN — APIXABAN 2.5 MG: 2.5 TABLET, FILM COATED ORAL at 19:43

## 2024-06-20 RX ADMIN — AMIODARONE HYDROCHLORIDE 200 MG: 200 TABLET ORAL at 09:15

## 2024-06-20 RX ADMIN — FAMOTIDINE 20 MG: 20 TABLET, FILM COATED ORAL at 09:15

## 2024-06-20 RX ADMIN — Medication 5 MG: at 19:43

## 2024-06-20 RX ADMIN — ACETAMINOPHEN 650 MG: 325 TABLET ORAL at 19:42

## 2024-06-20 RX ADMIN — APIXABAN 2.5 MG: 2.5 TABLET, FILM COATED ORAL at 09:15

## 2024-06-20 RX ADMIN — Medication 10 ML: at 09:15

## 2024-06-20 NOTE — PLAN OF CARE
Goal Outcome Evaluation:              Outcome Evaluation: Patient A&O x3 able to voice wants and needs to staff she has been able to answer all questions without any problems noted. She has had an EEG, ECHO and UA done today. Patient had a bowel movement today. Patient has been up to the restroom with assist x1 staff gait steady and no sign of dizziness. Has alarms on and in place with call light within reach.

## 2024-06-20 NOTE — ED NOTES
Billie Yu    Nursing Report ED to Floor:  Mental status: GCS 15  Ambulatory status: U@DEONDRE  Oxygen Therapy:  RA  Cardiac Rhythm: NSR  Admitted from: HOME  Safety Concerns:  NONE  Social Issues: NONE  ED Room #:  18    ED Nurse Phone Extension - [phone] or may call [phone].      HPI:   Chief Complaint   Patient presents with    Syncope       Past Medical History:  History reviewed. No pertinent past medical history.     Past Surgical History:  History reviewed. No pertinent surgical history.     Admitting Doctor:   Cuauhtemoc Guzman MD    Consulting Provider(s):  Consults       No orders found for last 30 day(s).             Admitting Diagnosis:   The encounter diagnosis was Syncope, unspecified syncope type.    Most Recent Vitals:   Vitals:    06/19/24 2125 06/19/24 2127 06/19/24 2134 06/20/24 0006   BP: 123/62 138/69 138/66    BP Location:       Patient Position: Lying Sitting Standing    Pulse: 78 81 75 68   Resp:       Temp:       TempSrc:       SpO2:    91%   Weight:       Height:           Active LDAs/IV Access:   Lines, Drains & Airways       Active LDAs       Name Placement date Placement time Site Days    Peripheral IV 06/19/24 Left Antecubital 06/19/24  --  Antecubital  1    Peripheral IV 06/19/24 1900 Anterior;Left Forearm 06/19/24  1900  Forearm  less than 1                    Labs (abnormal labs have a star):   Labs Reviewed   COMPREHENSIVE METABOLIC PANEL - Abnormal; Notable for the following components:       Result Value    Glucose 147 (*)     BUN 30 (*)     BUN/Creatinine Ratio 35.7 (*)     All other components within normal limits    Narrative:     GFR Normal >60  Chronic Kidney Disease <60  Kidney Failure <15    The GFR formula is only valid for adults with stable renal function between ages 18 and 70.   SINGLE HS TROPONIN T - Abnormal; Notable for the following components:    HS Troponin T 19 (*)     All other components within normal limits    Narrative:     High Sensitive Troponin T Reference  Range:  <14.0 ng/L- Negative Female for AMI  <22.0 ng/L- Negative Male for AMI  >=14 - Abnormal Female indicating possible myocardial injury.  >=22 - Abnormal Male indicating possible myocardial injury.   Clinicians would have to utilize clinical acumen, EKG, Troponin, and serial changes to determine if it is an Acute Myocardial Infarction or myocardial injury due to an underlying chronic condition.        CBC WITH AUTO DIFFERENTIAL - Abnormal; Notable for the following components:    MCV 99.2 (*)     Neutrophil % 79.7 (*)     Lymphocyte % 12.1 (*)     All other components within normal limits   SINGLE HS TROPONIN T - Abnormal; Notable for the following components:    HS Troponin T 16 (*)     All other components within normal limits    Narrative:     High Sensitive Troponin T Reference Range:  <14.0 ng/L- Negative Female for AMI  <22.0 ng/L- Negative Male for AMI  >=14 - Abnormal Female indicating possible myocardial injury.  >=22 - Abnormal Male indicating possible myocardial injury.   Clinicians would have to utilize clinical acumen, EKG, Troponin, and serial changes to determine if it is an Acute Myocardial Infarction or myocardial injury due to an underlying chronic condition.        MAGNESIUM - Normal   D-DIMER, QUANTITATIVE - Normal    Narrative:     According to the assay 's published package insert, a normal (<0.50 MCGFEU/mL) D-dimer result in conjunction with a non-high clinical probability assessment, excludes deep vein thrombosis (DVT) and pulmonary embolism (PE) with high sensitivity.    D-dimer values increase with age and this can make VTE exclusion of an older population difficult. To address this, the [employer], based on best available evidence and recent guidelines, recommends that clinicians use age-adjusted D-dimer thresholds in patients greater than 50 years of age with: a) a low probability of PE who do not meet all Pulmonary Embolism Rule Out Criteria, or b) in  "those with intermediate probability of PE.   The formula for an age-adjusted D-dimer cut-off is \"age/100\".  For example, a 60 year old patient would have an age-adjusted cut-off of 0.60 MCGFEU/mL and an 80 year old 0.80 MCGFEU/mL.   LACTIC ACID, PLASMA - Normal   RAINBOW DRAW    Narrative:     The following orders were created for panel order [place] Draw.  Procedure                               Abnormality         Status                     ---------                               -----------         ------                     Green Top (Gel)[[id number]]                                  Final result               Lavender Top[[id number]]                                     Final result               Gold Top - SST[[id number]]                                   Final result               Gray Top[[id number]]                                         Final result               Light Blue Top[[id number]]                                   Final result                 Please view results for these tests on the individual orders.   CBC AND DIFFERENTIAL    Narrative:     The following orders were created for panel order CBC & Differential.  Procedure                               Abnormality         Status                     ---------                               -----------         ------                     CBC Auto Differential[[id number]]        Abnormal            Final result                 Please view results for these tests on the individual orders.   GREEN TOP   LAVENDER TOP   GOLD TOP - SST   GRAY TOP   LIGHT BLUE TOP       Meds Given in ED:   Medications   sodium chloride 0.9 % flush 10 mL (has no administration in time range)   sodium chloride 0.9 % bolus 1,000 mL (0 mL Intravenous Stopped 6/19/24 2049)           Last NIH score:                                                          Dysphagia screening results:  Patient Factors Component (Dysphagia:Stroke or Rule-out)  Best Eye Response: 4-->(E4) spontaneous " (06/19/24 1856)  Best Motor Response: 6-->(M6) obeys commands (06/19/24 1856)  Best Verbal Response: 5-->(V5) oriented (06/19/24 1856)  [place] Coma Scale Score: 15 (06/19/24 1856)     [place] Coma Scale:  No data recorded     CIWA:        Restraint Type:            Isolation Status:  No active isolations

## 2024-06-20 NOTE — CONSULTS
[facility] at [facility]  Cardiovascular Consultation Note    Reason for consultation: Syncope    History of Present Illness:  88-year-old female with PTSD, narcissistic personality disorder, breast cancer at a young age, multiple episodes of syncope.  She was sitting with her son and then began to stare off.  She had some agitation and then slid down out of the chair and had loss of conscious for about 5 minutes.  I spoke to the son on the phone for about 20 minutes.  He tells me she has had 5 episodes.  This episode is the only one that he witnessed.  He states his mom was diagnosed with A-fib several months ago.  She has a history of noncompliance with medication.  In fact she tells me she was only taking her Eliquis once a day until she started twice a day yesterday.  When she was diagnosed with A-fib she did not feel palpitations.  She has no history of dyspnea or angina.  No edema    Cardiac risk factors: #1 age greater than 65    Past medical and surgical history, social and family history reviewed in EMR.    REVIEW OF SYSTEMS:     History reviewed. No pertinent past medical history.  History reviewed. No pertinent surgical history.  Social History     Socioeconomic History    Marital status:    Tobacco Use    Smoking status: Never    Smokeless tobacco: Never   Vaping Use    Vaping status: Never Used   Substance and Sexual Activity    Drug use: Never     History reviewed. No pertinent family history.    H&P ROS reviewed and pertinent CV ROS as noted in HPI.    Cardiac: History of atrial fibrillation without symptoms.  She has had 5 episodes of altered level of consciousness  Respiratory: No dyspnea no wheezing no hemoptysis  Lower Extremities: No edema or claudication  GI: No nausea vomiting diarrhea bright blood per rectum or melena  Neuro: Mild cognitive impairment  Hematology: No bleeding diathesis ecchymosis or petechia  Renal: No hematuria or kidney  "disease  Musculoskeletal: Has had previous fracture left wrist and hip  Endocrine: No diabetes or hypothyroidism  Constitutional: No fever or chills.  Psych: Narcissistic personality disorder and some mild dementia.  The son tells me that she is having progressive memory issues.      Physical Exam: General pleasant elderly thin female in a recliner.  She frequently closes her eyes when she talks.  Not dyspneic not tachypneic       HEENT: No JVP.  There is bilateral noise in the carotids noted.  No icterus       Respiratory: Equal bilateral symmetrical expansion\" bilaterally       Cardiovascular: Regular rate and rhythm with a grade 4 over systolic ejection murmur loudest at the cardiac apex which may be MR TR       GI: Soft and flat       Lower Extremities: No lesions       Neuro: Facial expressions are symmetrical moves all 4 extremities       Skin: Warm and dry with no edema       Psych: Pleasant affect    Results Review: Heart rates high 50s low 60s.  Blood pressures 1 43-1 63.  First EKG shows sinus rhythm T wave tenting lateral ST abnormalities and LAFB.  EKG 2346 on 6/19/2024 showed ST abnormalities improved.           Vital Sign Min/Max for last 24 hours  Temp  Min: 97.5 °F (36.4 °C)  Max: 97.8 °F (36.6 °C)   BP  Min: 123/62  Max: 163/78   Pulse  Min: 64  Max: 81   Resp  Min: 16  Max: 18   SpO2  Min: 91 %  Max: 96 %   No data recorded      Intake/Output Summary (Last 24 hours) at 6/20/2024 1103  Last data filed at 6/19/2024 2049  Gross per 24 hour   Intake 1000 ml   Output --   Net 1000 ml             Current Facility-Administered Medications:     acetaminophen (TYLENOL) tablet 650 mg, 650 mg, Oral, Q4H PRN **OR** acetaminophen (TYLENOL) suppository 650 mg, 650 mg, Rectal, Q4H PRN, Cydney Bruno, APRN    amiodarone (PACERONE) tablet 200 mg, 200 mg, Oral, Daily, Cydney Bruno, APRN, 200 mg at 06/20/24 0915    apixaban (ELIQUIS) tablet 2.5 mg, 2.5 mg, Oral, Q12H, Cydney Bruno, APRN, 2.5 mg at 06/20/24 " 0915    sennosides-docusate (PERICOLACE) 8.6-50 MG per tablet 2 tablet, 2 tablet, Oral, BID PRN **AND** polyethylene glycol (MIRALAX) packet 17 g, 17 g, Oral, Daily PRN **AND** bisacodyl (DULCOLAX) EC tablet 5 mg, 5 mg, Oral, Daily PRN **AND** bisacodyl (DULCOLAX) suppository 10 mg, 10 mg, Rectal, Daily PRN, Cydney Bruno APRN    Calcium Replacement - Follow Nurse / BPA Driven Protocol, , Does not apply, Daniel DANG Sarah M, MD    famotidine (PEPCID) tablet 20 mg, 20 mg, Oral, Daily, Cydney Bruno APRN, 20 mg at 06/20/24 0915    Magnesium Standard Dose Replacement - Follow Nurse / BPA Driven Protocol, , Does not apply, Daniel DANG Sarah M, MD    melatonin tablet 5 mg, 5 mg, Oral, Nightly PRN, Cydney Bruno APRN    nitroglycerin (NITROSTAT) SL tablet 0.4 mg, 0.4 mg, Sublingual, Q5 Min PRN, Cydney Bruno APRN    ondansetron ODT (ZOFRAN-ODT) disintegrating tablet 4 mg, 4 mg, Oral, Q6H PRN **OR** ondansetron (ZOFRAN) injection 4 mg, 4 mg, Intravenous, Q6H PRN, Cydney Bruno APRJOIE    Phosphorus Replacement - Follow Nurse / BPA Driven Protocol, , Does not apply, Daniel DANG Sarah M, MD    Potassium Replacement - Follow Nurse / BPA Driven Protocol, , Does not apply, Daniel DANG Sarah M, MD    sodium chloride 0.9 % flush 10 mL, 10 mL, Intravenous, PRN, Cydney Bruno APRJOEI    sodium chloride 0.9 % flush 10 mL, 10 mL, Intravenous, Q12H, Cydney Bruno APRN, 10 mL at 06/20/24 0915    sodium chloride 0.9 % flush 10 mL, 10 mL, Intravenous, PRN, Cydney Bruno APRJOIE    sodium chloride 0.9 % infusion 40 mL, 40 mL, Intravenous, PRN, Vibbert, Cydney M, APRN    Lab Review:   Results from last 7 days   Lab Units 06/20/24  0557 06/19/24  1900   WBC 10*3/mm3 5.51 7.29   HEMOGLOBIN g/dL 10.7* 12.8   PLATELETS 10*3/mm3 170 173     Results from last 7 days   Lab Units 06/20/24  0557 06/19/24  1900   SODIUM mmol/L 141 139   POTASSIUM mmol/L 4.4 4.5   CO2 mmol/L 27.0 26.0   BUN mg/dL 26* 30*   CREATININE mg/dL 0.60 0.84    MAGNESIUM mg/dL  --  2.1   PHOSPHORUS mg/dL 2.9  --    GLUCOSE mg/dL 92 147*     Estimated Creatinine Clearance: 54.7 mL/min (by C-G formula based on SCr of 0.6 mg/dL).    Results from last 7 days   Lab Units 06/19/24  1900   HEMOGLOBIN A1C % 5.60     .lipd  Lab Results   Component Value Date    AST 16 06/19/2024    ALT 14 06/19/2024       Radiology Reports:  Imaging Results (Last 72 Hours)       Procedure Component Value Units Date/Time    CT Head Without Contrast [[id number]] Collected: 06/19/24 2016     Updated: 06/19/24 2028    Narrative:      CT HEAD WO CONTRAST    Date of Exam: 6/19/2024 8:02 PM EDT    Indication: Syncope.    Comparison: None available.    Technique: Axial CT images were obtained of the head without contrast administration.  Automated exposure control and iterative construction methods were used.      Findings:  There is no evidence of hemorrhage. There is no mass effect or midline shift. Age-related involutional changes are visualized. Bilateral periventricular white matter hypodensities are visualized compatible with changes of chronic microvascular ischemia.    There is no extra-axial collections.    Ventricles are normal in size and configuration for patient's stated age.    Posterior fossa is within normal limits.    Calvarium and skull base appear intact. Visualized sinuses show no air fluid levels. The mastoid air cells are clear. Visualized orbits are unremarkable.        Impression:      Impression:    No acute intracranial process evident.    Age-related involutional changes and findings compatible with changes of chronic microvascular ischemia.        Electronically Signed: Dmitry Patricio MD    6/19/2024 8:17 PM EDT    Workstation ID: [id number]            Assessment/Plan: This patient had an episode of syncope and collapse.  Apparently she has had 4 other episodes over the last several months but this was the only one witness.  Review of telemetry shows no pauses no significant  bradycardia or A-fib.  She does have a grade 4 heart murmur.  Echocardiogram has been ordered.  Agree with MRI to look for prior embolic CVAs.  EEG is also been ordered.  The patient obviously needs to take her Eliquis twice daily.  Further recommendations pending the results of the MRI, EEG, echo  Will consider outpatient monitoring such as a Zio patch      Jay Aviles MD  06/20/24  11:03 EDT

## 2024-06-20 NOTE — THERAPY EVALUATION
Patient Name: Billie Yu  : 1935    MRN: [id number]                              Today's Date: 2024       Admit Date: 2024    Visit Dx:     ICD-10-CM ICD-9-CM   1. Syncope, unspecified syncope type  R55 780.2   2. History of atrial fibrillation  Z86.79 V12.59   3. History of hypertension  Z86.79 V12.59   4. History of dementia  Z86.59 V11.8     Patient Active Problem List   Diagnosis    Syncope    A-fib    Chronic anticoagulation    Narcissistic personality disorder    PTSD (post-traumatic stress disorder)    Forgetfulness    Snoring     History reviewed. No pertinent past medical history.  History reviewed. No pertinent surgical history.   General Information       Row Name 24 0953          Physical Therapy Time and Intention    Document Type evaluation (P)   -     Mode of Treatment individual therapy;physical therapy (P)   -       Row Name 24 0953          General Information    Patient Profile Reviewed yes (P)   -     Prior Level of Function independent:;all household mobility;community mobility;gait;transfer;bed mobility;ADL's (P)   -     Existing Precautions/Restrictions fall;orthostatic hypotension (P)   States she has had mulitple falls within the last 6 months, cannot remember how many.  -     Barriers to Rehab medically complex (P)   -       Row Name 24 0953          Living Environment    People in Home alone (P)   -       Row Name 24 0953          Home Main Entrance    Number of Stairs, Main Entrance one (P)   -     Stair Railings, Main Entrance none (P)   -       Row Name 24 0953          Stairs Within Home, Primary    Number of Stairs, Within Home, Primary none (P)   -       Row Name 24 0953          Cognition    Orientation Status (Cognition) oriented x 3 (P)   -       Row Name 24 0953          Safety Issues, Functional Mobility    Safety Issues Affecting Function (Mobility) at risk behavior observed;awareness of need  for assistance;insight into deficits/self-awareness;safety precaution awareness;safety precautions follow-through/compliance (P)   -TEJA     Impairments Affecting Function (Mobility) balance;endurance/activity tolerance (P)   -TEJA               User Key  (r) = Recorded By, (t) = Taken By, (c) = Cosigned By      Initials Name Provider Type    Samantha Angelo PT Student PT Student                   Mobility       Row Name 06/20/24 0954          Bed Mobility    Bed Mobility supine-sit (P)   -TEJA     Supine-Sit [place] (Bed Mobility) independent (P)   -TEJA     Assistive Device (Bed Mobility) -- (P)   -TEJA     Comment, (Bed Mobility) Required no verbal cuing. (P)   -TEJA       Row Name 06/20/24 0954          Sit-Stand Transfer    Sit-Stand [place] (Transfers) contact guard (P)   -TEJA     Comment, (Sit-Stand Transfer) 1x STS from bed. Denied symptoms of dizziness upon standing. (P)   -TEJA       Row Name 06/20/24 0954          Gait/Stairs (Locomotion)    [place] Level (Gait) contact guard (P)   -TEJA     Assistive Device (Gait) walker, front-wheeled (P)   -TEJA     Patient was able to Ambulate yes (P)   -TEJA     Distance in Feet (Gait) 150 (P)   150+150  -TEJA     Deviations/Abnormal Patterns (Gait) chato decreased (P)   -TEJA     [place] Level (Stairs) not tested (P)   -TEJA     Comment, (Gait/Stairs) Pt easily distracted by conversaion. Needed redirection to stay on task and to ambulate safely. No LOB. (P)   -TEJA               User Key  (r) = Recorded By, (t) = Taken By, (c) = Cosigned By      Initials Name Provider Type    Samantha Angelo PT Student PT Student                   Obj/Interventions       Row Name 06/20/24 1000          Range of Motion Comprehensive    General Range of Motion no range of motion deficits identified (P)   -TEJA       Row Name 06/20/24 1000          Strength Comprehensive (MMT)    General Manual Muscle Testing (MMT) Assessment no strength deficits identified (P)   -TEJA       Row Name  06/20/24 1000          Balance    Balance Assessment sitting static balance;sit to stand dynamic balance;standing static balance;standing dynamic balance (P)   -TEJA     Static Sitting Balance independent (P)   -TEJA     Position, Sitting Balance unsupported;sitting in chair;sitting edge of bed (P)   -TEJA     Sit to Stand Dynamic Balance contact guard (P)   -TEJA     Static Standing Balance independent (P)   -TEJA     Dynamic Standing Balance contact guard (P)   -TEJA     Position/Device Used, Standing Balance walker, front-wheeled (P)   -TEJA     Balance Interventions sitting;standing;sit to stand;supported;static;dynamic (P)   -TEJA     Comment, Balance Pt able to stand for extended period of time with no complaints of dizziness/fatigue. (P)   -TEJA       Row Name 06/20/24 1000          Sensory Assessment (Somatosensory)    Sensory Assessment (Somatosensory) LE sensation intact (P)   -TEJA               User Key  (r) = Recorded By, (t) = Taken By, (c) = Cosigned By      Initials Name Provider Type    TEJA Samantha Leigh, PT Student PT Student                   Goals/Plan       Row Name 06/20/24 1008          Transfer Goal 1 (PT)    Activity/Assistive Device (Transfer Goal 1, PT) sit-to-stand/stand-to-sit;bed-to-chair/chair-to-bed (P)   -TEJA     [place] Level/Cues Needed (Transfer Goal 1, PT) independent (P)   -TEJA     Time Frame (Transfer Goal 1, PT) short term goal (STG);2 days (P)   -TEJA       Row Name 06/20/24 1008          Gait Training Goal 1 (PT)    Activity/Assistive Device (Gait Training Goal 1, PT) gait (walking locomotion);assistive device use;walker, rolling (P)   -TEJA     [place] Level (Gait Training Goal 1, PT) modified independence (P)   -TEJA     Distance (Gait Training Goal 1, PT) 200 feet (P)   -TEJA     Time Frame (Gait Training Goal 1, PT) long term goal (LTG);5 days (P)   -TEJA       Row Name 06/20/24 1008          Stairs Goal 1 (PT)    Activity/Assistive Device (Stairs Goal 1, PT) stairs, all skills (P)   -TEJA      [place] Level/Cues Needed (Stairs Goal 1, PT) independent (P)   -TEJA     Number of Stairs (Stairs Goal 1, PT) 1 (P)   -TEJA     Time Frame (Stairs Goal 1, PT) long term goal (LTG);5 days (P)   -TEJA       Row Name 06/20/24 1008          Therapy Assessment/Plan (PT)    Planned Therapy Interventions (PT) balance training;gait training;neuromuscular re-education;patient/family education;postural re-education;stair training;strengthening;transfer training (P)   -TEJA               User Key  (r) = Recorded By, (t) = Taken By, (c) = Cosigned By      Initials Name Provider Type    Samantha Angelo, PT Student PT Student                   Clinical Impression       Row Name 06/20/24 1002          Pain    Pretreatment Pain Rating 0/10 - no pain (P)   -TEJA     Posttreatment Pain Rating 0/10 - no pain (P)   -TEJA     Pain Intervention(s) Repositioned;Ambulation/increased activity (P)   -TEJA       Row Name 06/20/24 1002          Plan of Care Review    Plan of Care Reviewed With patient (P)   -TEJA     Progress no change (P)   -TEJA     Outcome Evaluation Pt presents at baseline with all functional mobility tasks, however can benefit from 1-2 more PT sessions in order to monitor vitals with activity and assess safety with rollator use. Assessed supine-sit orthostatics which were stable. Pt is easily distracted and requires redirection to stay on task. D/C recommended to home. (P)   -TEJA       Row Name 06/20/24 1002          Therapy Assessment/Plan (PT)    Patient/Family Therapy Goals Statement (PT) home (P)   -TEJA     Rehab Potential (PT) good, to achieve stated therapy goals (P)   -TEJA     Criteria for Skilled Interventions Met (PT) yes;skilled treatment is necessary (P)   -TEJA     Therapy Frequency (PT) daily (P)   -TEJA     Predicted Duration of Therapy Intervention (PT) 5 days (P)   -TEJA       Row Name 06/20/24 1002          Vital Signs    Pre Systolic BP Rehab 153 (P)   -TEJA     Pre Treatment Diastolic BP 95 (P)   supine  -TEJA     Intra  Systolic BP Rehab 146 (P)   -TEJA     Intra Treatment Diastolic  (P)   sitting  -TEJA     Post Systolic BP Rehab 176 (P)   -TEJA     Post Treatment Diastolic BP 70 (P)   s/p ambulation  -TEJA     O2 Delivery Pre Treatment room air (P)   -TEJA     O2 Delivery Intra Treatment room air (P)   -TEJA     O2 Delivery Post Treatment room air (P)   -TEJA     Pre Patient Position Supine (P)   -TEJA     Intra Patient Position Standing (P)   -TEJA     Post Patient Position Sitting (P)   -TEJA       Row Name 06/20/24 1002          Positioning and Restraints    Pre-Treatment Position in bed (P)   -TEJA     Post Treatment Position chair (P)   -TEJA     In Chair notified nsg;reclined;call light within reach;encouraged to call for assist;exit alarm on;with other staff (P)   with physician  -TEJA               User Key  (r) = Recorded By, (t) = Taken By, (c) = Cosigned By      Initials Name Provider Type    Samantha Angelo, PT Student PT Student                   Outcome Measures       Row Name 06/20/24 1009 06/20/24 0133       How much help from another person do you currently need...    Turning from your back to your side while in flat bed without using bedrails? 4 (P)   -TEJA 4  -CD    Moving from lying on back to sitting on the side of a flat bed without bedrails? 4 (P)   -TEJA 4  -CD    Moving to and from a bed to a chair (including a wheelchair)? 3 (P)   -TEJA 4  -CD    Standing up from a chair using your arms (e.g., wheelchair, bedside chair)? 3 (P)   -TEJA 4  -CD    Climbing 3-5 steps with a railing? 3 (P)   -TEJA 4  -CD    To walk in hospital room? 3 (P)   -TEJA 4  -CD    AM-PAC 6 Clicks Score (PT) 20 (P)   -TEJA 24  -CD    Highest Level of Mobility Goal 6 --> Walk 10 steps or more (P)   -TEJA 8 --> Walked 250 feet or more  -CD      Row Name 06/20/24 1009          Functional Assessment    Outcome Measure Options AM-PAC 6 Clicks Basic Mobility (PT) (P)   -TEJA               User Key  (r) = Recorded By, (t) = Taken By, (c) = Cosigned By      Initials Name  Provider Type    CD Jesenia Duran, RN Registered Nurse    Samantha Angelo, PT Student PT Student                                 Physical Therapy Education       Title: PT OT SLP Therapies (In Progress)       Topic: Physical Therapy (In Progress)       Point: Mobility training (Done)       Learning Progress Summary             Patient Acceptance, E, VU,DU by TEJA at 6/20/2024 1010                         Point: Home exercise program (Not Started)       Learner Progress:  Not documented in this visit.              Point: Body mechanics (Done)       Learning Progress Summary             Patient Acceptance, E, VU,DU by TEJA at 6/20/2024 1010                         Point: Precautions (Done)       Learning Progress Summary             Patient Acceptance, E, VU,DU by TEJA at 6/20/2024 1010                                         User Key       Initials Effective Dates Name Provider Type Discipline    TEJA 05/07/24 -  Saamntha Leigh, PT Student PT Student PT                  PT Recommendation and Plan  Planned Therapy Interventions (PT): (P) balance training, gait training, neuromuscular re-education, patient/family education, postural re-education, stair training, strengthening, transfer training  Plan of Care Reviewed With: (P) patient  Progress: (P) no change  Outcome Evaluation: (P) Pt presents at baseline with all functional mobility tasks, however can benefit from 1-2 more PT sessions in order to monitor vitals with activity and assess safety with rollator use. Assessed supine-sit orthostatics which were stable. Pt is easily distracted and requires redirection to stay on task. D/C recommended to home.     Time Calculation:   PT Evaluation Complexity  History, PT Evaluation Complexity: (P) 1-2 personal factors and/or comorbidities  Examination of Body Systems (PT Eval Complexity): (P) total of 4 or more elements  Clinical Presentation (PT Evaluation Complexity): (P) stable  Clinical Decision Making (PT Evaluation  Complexity): (P) low complexity  Overall Complexity (PT Evaluation Complexity): (P) low complexity     PT Charges       Row Name 06/20/24 1019             Time Calculation    Start Time 0911 (P)   -TEJA      PT Received On 06/20/24 (P)   -TEJA      PT Goal Re-Cert Due Date 06/30/24 (P)   -TEJA         Untimed Charges    PT Eval/Re-eval Minutes 64 (P)   -TEJA         Total Minutes    Untimed Charges Total Minutes 64 (P)   -TEJA       Total Minutes 64 (P)   -                User Key  (r) = Recorded By, (t) = Taken By, (c) = Cosigned By      Initials Name Provider Type    Samantha Angelo, PT Student PT Student                  Therapy Charges for Today       Code Description Service Date Service Provider Modifiers Qty    [id number] HC PT EVAL LOW COMPLEXITY 4 6/20/2024 Samantha Leigh, PT Student GP 1            PT G-Codes  Outcome Measure Options: (P) AM-PAC 6 Clicks Basic Mobility (PT)  AM-PAC 6 Clicks Score (PT): (P) 20  PT Discharge Summary  Anticipated Discharge Disposition (PT): (P) home    Samantha Leigh PT Student  6/20/2024

## 2024-06-20 NOTE — PLAN OF CARE
"  Problem: Adult Inpatient Plan of Care  Goal: Plan of Care Review  Recent Flowsheet Documentation  Taken 6/20/2024 1051 by Bessy Carrasco OT  Progress: (IE) --  Plan of Care Reviewed With: patient  Outcome Evaluation: OT IE completed. Pt is A/Ox3 and motivated to work with therapy. Pt is up in room and hallway and transfering with CGA. Declines AD. Prefers to \"free walk\". Unsteady without overt LOB. SBA LB dressing task. Pt appears at or very near her baseline for ADLs. OT will follow IP to support return to home with Supervision when medically ready. No DME needs at this time.                           "
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
## 2024-06-20 NOTE — PLAN OF CARE
Goal Outcome Evaluation:  Plan of Care Reviewed With: (P) patient        Progress: (P) no change  Outcome Evaluation: (P) Pt presents at baseline with all functional mobility tasks, however can benefit from 1-2 more PT sessions in order to monitor vitals with activity and assess safety with rollator use. Assessed supine-sit orthostatics which were stable. Pt is easily distracted and requires redirection to stay on task. D/C recommended to home.      Anticipated Discharge Disposition (PT): (P) home

## 2024-06-20 NOTE — THERAPY EVALUATION
Patient Name: Billie Yu  : 1935    MRN: [id number]                              Today's Date: 2024       Admit Date: 2024    Visit Dx:     ICD-10-CM ICD-9-CM   1. Syncope, unspecified syncope type  R55 780.2   2. History of atrial fibrillation  Z86.79 V12.59   3. History of hypertension  Z86.79 V12.59   4. History of dementia  Z86.59 V11.8     Patient Active Problem List   Diagnosis    Syncope    A-fib    Chronic anticoagulation    Narcissistic personality disorder    PTSD (post-traumatic stress disorder)    Forgetfulness    Snoring     History reviewed. No pertinent past medical history.  History reviewed. No pertinent surgical history.   General Information       Row Name 24 1041          OT Time and Intention    Document Type evaluation  -TB     Mode of Treatment occupational therapy;individual therapy  -TB       Row Name 24 1041          General Information    Patient Profile Reviewed yes  -TB     Prior Level of Function independent:;all household mobility;transfer;bed mobility;ADL's;dependent:;driving  -TB     Existing Precautions/Restrictions fall;orthostatic hypotension;other (see comments)  Admitted following r/o syncopal episode. Pt reports recurrent falls with injuries.  -TB     Barriers to Rehab medically complex  -TB       Row Name 24 1041          Occupational Profile    Reason for Services/Referral (Occupational Profile) Occupational decline  -TB     Environmental Supports and Barriers (Occupational Profile) Pt lives alone in a single story home with 1 step to enter. Walk-in shower. Comfort ht commodes. No AD within the home. Rollator in community. Reports recurrent falls with injuries. Independent with self-care and simple home mgmt. Family provides transportation and assist with home mgmt as needed.  -TB       Row Name 24 1041          Living Environment    People in Home alone  -TB       Row Name 24 1041          Home Main Entrance    Number of  "Stairs, Main Entrance one  -TB     Stair Railings, Main Entrance none  -TB       Row Name 06/20/24 1041          Stairs Within Home, Primary    Number of Stairs, Within Home, Primary none  -TB       Row Name 06/20/24 1041          Cognition    Orientation Status (Cognition) oriented x 3  -TB       Row Name 06/20/24 1041          Safety Issues, Functional Mobility    Safety Issues Affecting Function (Mobility) awareness of need for assistance;insight into deficits/self-awareness;safety precaution awareness;safety precautions follow-through/compliance;judgment  -TB     Impairments Affecting Function (Mobility) balance;endurance/activity tolerance;strength  -TB     Comment, Safety Issues/Impairments (Mobility) Pt is up in room and hallway with CGA. Declines AD. Prefers to \"free walk\".  -TB               User Key  (r) = Recorded By, (t) = Taken By, (c) = Cosigned By      Initials Name Provider Type    TB Bessy Carrasco, OT Occupational Therapist                     Mobility/ADL's       Row Name 06/20/24 1046          Bed Mobility    Comment, (Bed Mobility) UIC  -TB       Row Name 06/20/24 1046          Transfers    Transfers sit-stand transfer;stand-sit transfer;bed-chair transfer  -TB     Comment, (Transfers) Good good sequencing and safety. Pt talks thru \"how to be safe\" with transfers and what she has been taught by OT/PTs in the past.  -TB       Row Name 06/20/24 1046          Bed-Chair Transfer    Bed-Chair [place] (Transfers) contact guard  -TB       Row Name 06/20/24 1046          Sit-Stand Transfer    Sit-Stand [place] (Transfers) contact guard  -TB       Row Name 06/20/24 1046          Stand-Sit Transfer    Stand-Sit [place] (Transfers) contact guard  -TB       Row Name 06/20/24 1046          Functional Mobility    Functional Mobility- Ind. Level contact guard assist  -TB     Functional Mobility-Distance (Feet) 100  -TB     Functional Mobility- Safety Issues balance decreased during turns " " -TB     Functional Mobility- Comment Pt is up in room and hallway with CGA. Declines AD. Prefers to \"free walk\".  -TB     Patient was able to Ambulate yes  -       Row Name 06/20/24 1046          Activities of Daily Living    BADL Assessment/Intervention lower body dressing;upper body dressing;toileting  -TB       Row Name 06/20/24 1046          Lower Body Dressing Assessment/Training    [place] Level (Lower Body Dressing) doff;don;socks;standby assist  -TB     Position (Lower Body Dressing) supported sitting  -TB       Row Name 06/20/24 1046          Upper Body Dressing Assessment/Training    [place] Level (Upper Body Dressing) don;pajama/robe;set up  -TB     Position (Upper Body Dressing) unsupported sitting  -TB       Row Name 06/20/24 1046          Toileting Assessment/Training    [place] Level (Toileting) contact guard assist;adjust/manage clothing;independent;perform perineal hygiene  -TB     Position (Toileting) unsupported sitting;supported standing  -               User Key  (r) = Recorded By, (t) = Taken By, (c) = Cosigned By      Initials Name Provider Type    TB Bessy Carrasco OT Occupational Therapist                   Obj/Interventions       Row Name 06/20/24 1048          Sensory Assessment (Somatosensory)    Sensory Assessment (Somatosensory) UE sensation intact  -[facility] Name 06/20/24 1048          Range of Motion Comprehensive    General Range of Motion bilateral upper extremity ROM WFL  -TB     Comment, General Range of Motion BUE AROM WFL for self-care performance. Limited by arthritis.  -       Row Name 06/20/24 1048          Strength Comprehensive (MMT)    Comment, General Manual Muscle Testing (MMT) Assessment Generalized weakness. BUE WFL for self-care performance. 3+5 shoulders. 4-/5 .  -       Row Name 06/20/24 1048          Balance    Balance Assessment sitting dynamic balance;sit to stand dynamic balance;standing dynamic balance  -     Dynamic " Sitting Balance supervision  -TB     Position, Sitting Balance unsupported;sitting in chair  -TB     Sit to Stand Dynamic Balance contact guard  -TB     Dynamic Standing Balance contact guard  -TB     Position/Device Used, Standing Balance unsupported  -TB     Balance Interventions sitting;standing;sit to stand;supported;static;dynamic;dynamic reaching;occupation based/functional task;UE activity with balance activity  -TB     Comment, Balance Pt up in room and hallway without AD. Unsteady at times without overt LOB.  -TB               User Key  (r) = Recorded By, (t) = Taken By, (c) = Cosigned By      Initials Name Provider Type    TB Bessy Carrasco OT Occupational Therapist                   Goals/Plan       Row Name 06/20/24 1056          Transfer Goal 1 (OT)    Activity/Assistive Device (Transfer Goal 1, OT) toilet  -TB     Orange Level/Cues Needed (Transfer Goal 1, OT) supervision required  -TB     Time Frame (Transfer Goal 1, OT) long term goal (LTG);5 days  -TB     Progress/Outcome (Transfer Goal 1, OT) goal ongoing  -TB       Row Name 06/20/24 1056          Dressing Goal 1 (OT)    Activity/Device (Dressing Goal 1, OT) dressing skills, all  -TB     Orange/Cues Needed (Dressing Goal 1, OT) supervision required  -TB     Time Frame (Dressing Goal 1, OT) long term goal (LTG);5 days  -TB     Progress/Outcome (Dressing Goal 1, OT) goal ongoing  -TB       Row Name 06/20/24 1056          Grooming Goal 1 (OT)    Activity/Device (Grooming Goal 1, OT) oral care;hair care  -TB     Orange (Grooming Goal 1, OT) supervision required  -TB     Time Frame (Grooming Goal 1, OT) short term goal (STG);3 days  -TB     Strategies/Barriers (Grooming Goal 1, OT) Standing sink side  -TB     Progress/Outcome (Grooming Goal 1, OT) goal ongoing  -TB               User Key  (r) = Recorded By, (t) = Taken By, (c) = Cosigned By      Initials Name Provider Type    TB Bessy Carrasco OT Occupational  "Therapist                   Clinical Impression       Row Name 06/20/24 1051          Pain Assessment    Pretreatment Pain Rating 0/10 - no pain  -TB     Posttreatment Pain Rating 0/10 - no pain  -TB     Pre/Posttreatment Pain Comment Pt denies pain with dynamic activity  -TB     Pain Intervention(s) Ambulation/increased activity;Repositioned  -TB       Row Name 06/20/24 1051          Plan of Care Review    Plan of Care Reviewed With patient  -TB     Progress --  IE  -TB     Outcome Evaluation OT IE completed. Pt is A/Ox3 and motivated to work with therapy. Pt is up in room and hallway and transfering with CGA. Declines AD. Prefers to \"free walk\". Unsteady without overt LOB. SBA LB dressing task. Pt appears at or very near her baseline for ADLs. OT will follow IP to support return to home when medically ready. No DME needs at this time.  -TB       Row Name 06/20/24 1051          Therapy Assessment/Plan (OT)    Rehab Potential (OT) good, to achieve stated therapy goals  -TB     Criteria for Skilled Therapeutic Interventions Met (OT) yes;meets criteria;skilled treatment is necessary  -TB     Therapy Frequency (OT) daily  -TB       Row Name 06/20/24 1051          Therapy Plan Review/Discharge Plan (OT)    Anticipated Discharge Disposition (OT) home with supervision  -TB       Row Name 06/20/24 1051          Vital Signs    Pre Systolic BP Rehab 176  RN cleared OT  -TB     Pre Treatment Diastolic BP 70  -TB     Post Systolic BP Rehab 163  -TB     Post Treatment Diastolic BP 66  following ambulation  -TB     O2 Delivery Pre Treatment room air  -TB     Pre Patient Position Sitting  -TB     Intra Patient Position Standing  -TB     Post Patient Position Sitting  -TB       Row Name 06/20/24 1051          Positioning and Restraints    Pre-Treatment Position sitting in chair/recliner  -TB     Post Treatment Position chair  -TB     In Chair notified nsg;reclined;call light within reach;encouraged to call for assist;exit alarm " on;legs elevated  -TB               User Key  (r) = Recorded By, (t) = Taken By, (c) = Cosigned By      Initials Name Provider Type    TB Bessy Carrasco, OT Occupational Therapist                   Outcome Measures       Row Name 06/20/24 1057          How much help from another is currently needed...    Putting on and taking off regular lower body clothing? 3  -TB     Bathing (including washing, rinsing, and drying) 3  -TB     Toileting (which includes using toilet bed pan or urinal) 3  -TB     Putting on and taking off regular upper body clothing 3  -TB     Taking care of personal grooming (such as brushing teeth) 3  -TB     Eating meals 4  -TB     AM-PAC 6 Clicks Score (OT) 19  -TB       Row Name 06/20/24 1009 06/20/24 0133       How much help from another person do you currently need...    Turning from your back to your side while in flat bed without using bedrails? 4  -CD (r) TEJA (t) CD (c) 4  -CDA    Moving from lying on back to sitting on the side of a flat bed without bedrails? 4  -CD (r) TEJA (t) CD (c) 4  -CDA    Moving to and from a bed to a chair (including a wheelchair)? 3  -CD (r) TEJA (t) CD (c) 4  -CDA    Standing up from a chair using your arms (e.g., wheelchair, bedside chair)? 3  -CD (r) TEJA (t) CD (c) 4  -CDA    Climbing 3-5 steps with a railing? 3  -CD (r) TEJA (t) CD (c) 4  -CDA    To walk in hospital room? 3  -CD (r) TEJA (t) CD (c) 4  -CDA    AM-PAC 6 Clicks Score (PT) 20  -CD (r) TEJA (t) 24  -CDA    Highest Level of Mobility Goal 6 --> Walk 10 steps or more  -CD (r) TEJA (t) 8 --> Walked 250 feet or more  -CDA      Row Name 06/20/24 1057 06/20/24 1009       Functional Assessment    Outcome Measure Options AM-PAC 6 Clicks Daily Activity (OT)  -TB AM-PAC 6 Clicks Basic Mobility (PT)  -CD (r) TEJA (t) CD (c)              User Key  (r) = Recorded By, (t) = Taken By, (c) = Cosigned By      Initials Name Provider Type    Bessy Perkins, OT Occupational Therapist    Jaycee Calix, PT  "Physical Therapist    Jesenia Estevez, RN Registered Nurse    Samantha Angelo, PT Student PT Student                    Occupational Therapy Education       Title: PT OT SLP Therapies (In Progress)       Topic: Occupational Therapy (In Progress)       Point: ADL training (Done)       Description:   Instruct learner(s) on proper safety adaptation and remediation techniques during self care or transfers.   Instruct in proper use of assistive devices.                  Learning Progress Summary             Patient Acceptance, E,D, VU,DU,NR by  at 6/20/2024 1058                         Point: Home exercise program (Not Started)       Description:   Instruct learner(s) on appropriate technique for monitoring, assisting and/or progressing therapeutic exercises/activities.                  Learner Progress:  Not documented in this visit.              Point: Precautions (Not Started)       Description:   Instruct learner(s) on prescribed precautions during self-care and functional transfers.                  Learner Progress:  Not documented in this visit.              Point: Body mechanics (Not Started)       Description:   Instruct learner(s) on proper positioning and spine alignment during self-care, functional mobility activities and/or exercises.                  Learner Progress:  Not documented in this visit.                              User Key       Initials Effective Dates Name Provider Type Discipline     07/11/23 -  Bessy Carrasco OT Occupational Therapist OT                  OT Recommendation and Plan  Therapy Frequency (OT): daily  Plan of Care Review  Plan of Care Reviewed With: patient  Progress:  (IE)  Outcome Evaluation: OT IE completed. Pt is A/Ox3 and motivated to work with therapy. Pt is up in room and hallway and transfering with CGA. Declines AD. Prefers to \"free walk\". Unsteady without overt LOB. SBA LB dressing task. Pt appears at or very near her baseline for ADLs. OT will follow " IP to support return to home when medically ready. No DME needs at this time.     Time Calculation:   Evaluation Complexity (OT)  Review Occupational Profile/Medical/Therapy History Complexity: expanded/moderate complexity  Assessment, Occupational Performance/Identification of Deficit Complexity: 3-5 performance deficits  Clinical Decision Making Complexity (OT): detailed assessment/moderate complexity  Overall Complexity of Evaluation (OT): moderate complexity     Time Calculation- OT       Row Name 06/20/24 0952             Time Calculation- OT    OT Start Time 0952  -TB      OT Received On 06/20/24  -TB      OT Goal Re-Cert Due Date 06/30/24  -TB         Timed Charges    36440 - OT Self Care/Mgmt Minutes 12  -TB         Untimed Charges    OT Eval/Re-eval Minutes 55  -TB         Total Minutes    Timed Charges Total Minutes 12  -TB      Untimed Charges Total Minutes 55  -TB       Total Minutes 67  -TB                User Key  (r) = Recorded By, (t) = Taken By, (c) = Cosigned By      Initials Name Provider Type    TB Bessy Carrasco, OT Occupational Therapist                  Therapy Charges for Today       Code Description Service Date Service Provider Modifiers Qty    69475525632 HC OT SELF CARE/MGMT/TRAIN EA 15 MIN 6/20/2024 Bessy Carrasco, OT GO 1    [id number] HC OT EVAL MOD COMPLEXITY 4 6/20/2024 Bessy Carrasco OT GO 1                 Bessy Carrasco OT  6/20/2024

## 2024-06-21 ENCOUNTER — APPOINTMENT (OUTPATIENT)
Dept: MRI IMAGING | Facility: HOSPITAL | Age: 89
DRG: 312 | End: 2024-06-21
Payer: MEDICARE

## 2024-06-21 DIAGNOSIS — R55 SYNCOPE AND COLLAPSE: ICD-10-CM

## 2024-06-21 DIAGNOSIS — R42 DIZZINESS: Primary | ICD-10-CM

## 2024-06-21 LAB
AORTIC DIMENSIONLESS INDEX: 0.39 (DI)
BH CV ECHO MEAS - AI P1/2T: 350.7 MSEC
BH CV ECHO MEAS - AO MAX PG: 30.7 MMHG
BH CV ECHO MEAS - AO MEAN PG: 17.2 MMHG
BH CV ECHO MEAS - AO ROOT DIAM: 2.5 CM
BH CV ECHO MEAS - AO V2 MAX: 277 CM/SEC
BH CV ECHO MEAS - AO V2 VTI: 64.4 CM
BH CV ECHO MEAS - AVA(I,D): 1.2 CM2
BH CV ECHO MEAS - EDV(CUBED): 74.1 ML
BH CV ECHO MEAS - EDV(MOD-SP2): 79.4 ML
BH CV ECHO MEAS - EDV(MOD-SP4): 107 ML
BH CV ECHO MEAS - EF(MOD-BP): 48.7 %
BH CV ECHO MEAS - EF(MOD-SP2): 48.9 %
BH CV ECHO MEAS - EF(MOD-SP4): 48 %
BH CV ECHO MEAS - ESV(CUBED): 22 ML
BH CV ECHO MEAS - ESV(MOD-SP2): 40.6 ML
BH CV ECHO MEAS - ESV(MOD-SP4): 55.6 ML
BH CV ECHO MEAS - FS: 33.3 %
BH CV ECHO MEAS - IVS/LVPW: 1 CM
BH CV ECHO MEAS - IVSD: 1.1 CM
BH CV ECHO MEAS - LA DIMENSION: 2.6 CM
BH CV ECHO MEAS - LAT PEAK E' VEL: 6.2 CM/SEC
BH CV ECHO MEAS - LV MASS(C)D: 157.1 GRAMS
BH CV ECHO MEAS - LV MAX PG: 4.4 MMHG
BH CV ECHO MEAS - LV MEAN PG: 2 MMHG
BH CV ECHO MEAS - LV V1 MAX: 105 CM/SEC
BH CV ECHO MEAS - LV V1 VTI: 24.4 CM
BH CV ECHO MEAS - LVIDD: 4.2 CM
BH CV ECHO MEAS - LVIDS: 2.8 CM
BH CV ECHO MEAS - LVOT AREA: 3.1 CM2
BH CV ECHO MEAS - LVOT DIAM: 2 CM
BH CV ECHO MEAS - LVPWD: 1.1 CM
BH CV ECHO MEAS - MED PEAK E' VEL: 4.7 CM/SEC
BH CV ECHO MEAS - MV A MAX VEL: 142 CM/SEC
BH CV ECHO MEAS - MV DEC SLOPE: 318 CM/SEC2
BH CV ECHO MEAS - MV DEC TIME: 0.37 SEC
BH CV ECHO MEAS - MV E MAX VEL: 95.6 CM/SEC
BH CV ECHO MEAS - MV E/A: 0.67
BH CV ECHO MEAS - MV MAX PG: 11.3 MMHG
BH CV ECHO MEAS - MV MEAN PG: 4 MMHG
BH CV ECHO MEAS - MV P1/2T: 102.2 MSEC
BH CV ECHO MEAS - MV V2 VTI: 37.8 CM
BH CV ECHO MEAS - MVA(P1/2T): 2.15 CM2
BH CV ECHO MEAS - MVA(VTI): 2.02 CM2
BH CV ECHO MEAS - RAP SYSTOLE: 3 MMHG
BH CV ECHO MEAS - RVSP: 37 MMHG
BH CV ECHO MEAS - SV(LVOT): 76.5 ML
BH CV ECHO MEAS - SV(MOD-SP2): 38.8 ML
BH CV ECHO MEAS - SV(MOD-SP4): 51.4 ML
BH CV ECHO MEAS - TAPSE (>1.6): 3.2 CM
BH CV ECHO MEAS - TR MAX PG: 34 MMHG
BH CV ECHO MEAS - TR MAX VEL: 290 CM/SEC
BH CV ECHO MEASUREMENTS AVERAGE E/E' RATIO: 17.54
BH CV XLRA - RV BASE: 3.7 CM
BH CV XLRA - RV LENGTH: 6 CM
BH CV XLRA - RV MID: 3.2 CM
BH CV XLRA - TDI S': 16 CM/SEC
LEFT ATRIUM VOLUME INDEX: 56.1 ML/M2
QT INTERVAL: 426 MS
QT INTERVAL: 450 MS
QTC INTERVAL: 469 MS
QTC INTERVAL: 492 MS

## 2024-06-21 PROCEDURE — 0 GADOBENATE DIMEGLUMINE 529 MG/ML SOLUTION: Performed by: INTERNAL MEDICINE

## 2024-06-21 PROCEDURE — 99232 SBSQ HOSP IP/OBS MODERATE 35: CPT | Performed by: PSYCHIATRY & NEUROLOGY

## 2024-06-21 PROCEDURE — A9577 INJ MULTIHANCE: HCPCS | Performed by: INTERNAL MEDICINE

## 2024-06-21 PROCEDURE — 70553 MRI BRAIN STEM W/O & W/DYE: CPT

## 2024-06-21 PROCEDURE — 99231 SBSQ HOSP IP/OBS SF/LOW 25: CPT | Performed by: INTERNAL MEDICINE

## 2024-06-21 PROCEDURE — 99232 SBSQ HOSP IP/OBS MODERATE 35: CPT | Performed by: NURSE PRACTITIONER

## 2024-06-21 RX ORDER — DONEPEZIL HYDROCHLORIDE 10 MG/1
5 TABLET, FILM COATED ORAL NIGHTLY
Status: DISCONTINUED | OUTPATIENT
Start: 2024-06-21 | End: 2024-06-22 | Stop reason: HOSPADM

## 2024-06-21 RX ORDER — DONEPEZIL HYDROCHLORIDE 5 MG/1
TABLET, FILM COATED ORAL
Qty: 74 TABLET | Refills: 0 | Status: SHIPPED | OUTPATIENT
Start: 2024-06-21 | End: 2024-08-04

## 2024-06-21 RX ORDER — SPIRONOLACTONE 25 MG/1
25 TABLET ORAL DAILY
Status: DISCONTINUED | OUTPATIENT
Start: 2024-06-21 | End: 2024-06-22 | Stop reason: HOSPADM

## 2024-06-21 RX ORDER — DONEPEZIL HYDROCHLORIDE 5 MG/1
TABLET, FILM COATED ORAL
Qty: 74 TABLET | Refills: 0 | Status: SHIPPED | OUTPATIENT
Start: 2024-06-21 | End: 2024-06-21

## 2024-06-21 RX ADMIN — DONEPEZIL HYDROCHLORIDE 5 MG: 10 TABLET, FILM COATED ORAL at 20:12

## 2024-06-21 RX ADMIN — APIXABAN 2.5 MG: 2.5 TABLET, FILM COATED ORAL at 20:10

## 2024-06-21 RX ADMIN — APIXABAN 2.5 MG: 2.5 TABLET, FILM COATED ORAL at 07:54

## 2024-06-21 RX ADMIN — FAMOTIDINE 20 MG: 20 TABLET, FILM COATED ORAL at 07:53

## 2024-06-21 RX ADMIN — LISINOPRIL 10 MG: 10 TABLET ORAL at 07:53

## 2024-06-21 RX ADMIN — Medication 10 ML: at 20:10

## 2024-06-21 RX ADMIN — GADOBENATE DIMEGLUMINE 10 ML: 529 INJECTION, SOLUTION INTRAVENOUS at 16:34

## 2024-06-21 RX ADMIN — AMIODARONE HYDROCHLORIDE 200 MG: 200 TABLET ORAL at 07:52

## 2024-06-21 RX ADMIN — Medication 5 MG: at 20:12

## 2024-06-21 RX ADMIN — Medication 10 ML: at 07:54

## 2024-06-21 NOTE — CASE MANAGEMENT/SOCIAL WORK
Continued Stay Note  [facility]     Patient Name: Billie Yu  MRN: [id number]  Today's Date: 6/21/2024    Admit Date: 6/19/2024    Plan: home   Discharge Plan       Row Name 06/21/24 0900       Plan    Plan home    Plan Comments I spoke with patient's son Calin in regards to discharge planning. He tells me she recently moved from [place] and is obtaining medical records from former providers. Calin tells me patient lives alone in [place] and is independent with ADL's with family providing some assistance with transportation.  He tells me she declines to go any assisted living such as [facility], where patient's  lives.  I offered to give him information about other local assisted living facilities but he has a good working knowledge of facilities.   She has a cane, grab bars, rollator in use as needed. She does not drive.  I discussed rehab recommendations of home with assistance and offered to obtain home health for her and he will discuss this with his mom. I will leave list of agencies along with sitter list.  She has [facility] Medicare. Calin is working to obtain a PCP at [facility]. Calin is POA.   I will speak with patient.  is given the referral per Dr. Owen's orders.     Addendum: I spoke with patient who plans for home. She is not eligible for home health due to no PCP and is agreeable for a referral for other therapy services that can come into the home. I have given a referral to [facility] speaking with [place]. She will run insurance and get back to me. If I hear, she is not able to take, I will contact [facility]. Patient tells me she may not want this but is willing to think about it.     Final Discharge Disposition Code 01 - home or self-care                   Discharge Codes    No documentation.                 Expected Discharge Date and Time       Expected Discharge Date Expected Discharge Time    Jun 22, 2024               Ingrid Rodríguez RN

## 2024-06-21 NOTE — PROGRESS NOTES
Neurology       Patient Care Team:  Provider, No Known as PCP - General    Chief complaint: Mild memory issue    History: The patient is doing and feeling well.    2-hour EEG showed mild generalized slowing affecting left hemisphere slightly more.    No evidence of epileptic abnormality or major changes are seen.          History reviewed. No pertinent past medical history.    Vital Signs   Vitals:    06/21/24 0415 06/21/24 0712 06/21/24 0839 06/21/24 1137   BP: 147/61  153/67 152/75   BP Location: Right arm  Right arm Right arm   Patient Position: Lying  Lying Lying   Pulse: 67 65 64 59   Resp: 18 18  16   Temp:  98.3 °F (36.8 °C)  98.2 °F (36.8 °C)   TempSrc:  Oral  Oral   SpO2:       Weight:       Height:           Physical Exam:   General: Pleasant and alert              Neuro: Tracking awake.    MoCA study noted scoring 20 out of 30 qualify for mild cognitive impairment    Results Review:  Mild cognitive impairment  Results from last 7 days   Lab Units 06/20/24  0557   WBC 10*3/mm3 5.51   HEMOGLOBIN g/dL 10.7*   HEMATOCRIT % 33.5*   PLATELETS 10*3/mm3 170     Results from last 7 days   Lab Units 06/20/24  0557 06/19/24  1900   SODIUM mmol/L 141 139   POTASSIUM mmol/L 4.4 4.5   CHLORIDE mmol/L 106 102   CO2 mmol/L 27.0 26.0   BUN mg/dL 26* 30*   CREATININE mg/dL 0.60 0.84   CALCIUM mg/dL 8.4* 9.3   BILIRUBIN mg/dL  --  0.3   ALK PHOS U/L  --  66   ALT (SGPT) U/L  --  14   AST (SGOT) U/L  --  16   GLUCOSE mg/dL 92 147*       Imaging Results (Last 24 Hours)       ** No results found for the last 24 hours. **            Assessment:  Mild cognitive impaired    Falls    Plan:  Donepezil 5 mg daily for 2 weeks raised to 10 mg.  Patient apprised of the fact that it works for about 35% of people and that the side effects primarily consist of nausea vomiting and diarrhea.    Comment:  Patient is mentally competent to make informed decisions be to go to bed.    She is okay to discharge.  Patient follow-up with [facility]  neurology memory clinic 2 months         I discussed the patients findings and my recommendations with patient and primary care team    Kirk Owen MD  06/21/24  14:20 EDT

## 2024-06-21 NOTE — CASE MANAGEMENT/SOCIAL WORK
Continued Stay Note  [facility]     Patient Name: Billie Yu  MRN: [id number]  Today's Date: 6/21/2024    Admit Date: 6/19/2024    Plan: home   Discharge Plan       Row Name 06/21/24 0948       Plan    Plan Comments MSW received consult for evaluation. Per chart review and physician, pt is decisional still at this time and no physician or charting has stated that pt is unable to make any decisions. MSW spoke with pt at bedside. Pt reports her plan is to discharge home. Pt reports that she is able to take care of everything she needs at home and also receives help from her family. Pt reports she is not agreeable to an assisted living or any other place or option at this time and plans to discharge home. MSW aware that per neuro, pt does have mental health and cognitive issues, however, where she is decisional at this time, pt is cookie to decide where she goes at disharge. Pt reports she castellon not need assisted living or any other resources. Pt did agree to home health. CM will set up home health for pt. MSW also provided pt a sitter list as well as mental health resources. Pt had no other concerns at this time and plans to discharge home with home health and reports her son can provide transportation.    Final Discharge Disposition Code 06 - home with home health care                                                Discharge Codes    No documentation.                 Expected Discharge Date and Time       Expected Discharge Date Expected Discharge Time    Jun 22, 2024               ELIZABETH Juan

## 2024-06-21 NOTE — SIGNIFICANT NOTE
MOCA DOCUMENTATION     Patient stated she is in her normal cognitive state.  Room was clear of distractions, quiet environment and only APRN and patient were present.     Total score 20/30     Mild cognitive impairment    Domain breakdown as follows:     Executive function 3/5     Naming 3/3     Attention 4/6     Language 2/3     Abstract 2/2     Delayed recall 0/5     Orientation 6/6       FAROOQ Parker

## 2024-06-21 NOTE — DISCHARGE INSTR - APPOINTMENTS
[facility] [place] will reach out to you for home Physical therapy and occupational therapy. They can be reached at [phone]. This is considered outpatient and not home health.
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
## 2024-06-21 NOTE — PLAN OF CARE
Goal Outcome Evaluation:      VSS. NSR on tele. Pt answers orientation questions correctly but has moments of confusion. Pt set off bed alarm several times overnight and tries to turn alarm off herself, she also tried to call son in middle of the night. Pt educated on importance of calling out to staff. Pt refused MRI bc she was already asleep.

## 2024-06-21 NOTE — PROGRESS NOTES
[facility]  IP Progress Note      Chief Complaint/Reason for service: #1 syncope #2 history of A-fib #3 heart murmur    Subjective   Subjective: Patient is awake in the restroom washing her face.  She denies shortness of breath.  I went over the results of her echocardiogram with her    Past medical, surgical, social and family history reviewed in the patient's electronic medical record.    Objective     Vital Sign Min/Max for last 24 hours  Temp  Min: 98.1 °F (36.7 °C)  Max: 98.3 °F (36.8 °C)   BP  Min: 126/49  Max: 158/64   Pulse  Min: 61  Max: 72   Resp  Min: 16  Max: 19   No data recorded   No data recorded      Intake/Output Summary (Last 24 hours) at 6/21/2024 0741  Last data filed at 6/20/2024 0800  Gross per 24 hour   Intake 200 ml   Output --   Net 200 ml             Current Facility-Administered Medications:     acetaminophen (TYLENOL) tablet 650 mg, 650 mg, Oral, Q4H PRN, 650 mg at 06/20/24 1942 **OR** acetaminophen (TYLENOL) suppository 650 mg, 650 mg, Rectal, Q4H PRN, Cydney Bruno, APRN    amiodarone (PACERONE) tablet 200 mg, 200 mg, Oral, Daily, Cydney Bruno, APRN, 200 mg at 06/20/24 0915    apixaban (ELIQUIS) tablet 2.5 mg, 2.5 mg, Oral, Q12H, Cydney Bruno, APRN, 2.5 mg at 06/20/24 1943    sennosides-docusate (PERICOLACE) 8.6-50 MG per tablet 2 tablet, 2 tablet, Oral, BID PRN **AND** polyethylene glycol (MIRALAX) packet 17 g, 17 g, Oral, Daily PRN **AND** bisacodyl (DULCOLAX) EC tablet 5 mg, 5 mg, Oral, Daily PRN **AND** bisacodyl (DULCOLAX) suppository 10 mg, 10 mg, Rectal, Daily PRN, Cydney Bruno, APRN    Calcium Replacement - Follow Nurse / BPA Driven Protocol, , Does not apply, PRN, Ingrid Sanchez MD    famotidine (PEPCID) tablet 20 mg, 20 mg, Oral, Daily, Cydney Bruno, APRN, 20 mg at 06/20/24 0915    lisinopril (PRINIVIL,ZESTRIL) tablet 10 mg, 10 mg, Oral, Daily, Ingrid Sanchez MD    Magnesium Standard Dose Replacement - Follow Nurse / BPA  Driven Protocol, , Does not apply, Daniel DANG Sarah M, MD    melatonin tablet 5 mg, 5 mg, Oral, Nightly PRN, Cydney Bruno APRN, 5 mg at 06/20/24 1943    nitroglycerin (NITROSTAT) SL tablet 0.4 mg, 0.4 mg, Sublingual, Q5 Min PRN, Cydney Bruno, APRN    ondansetron ODT (ZOFRAN-ODT) disintegrating tablet 4 mg, 4 mg, Oral, Q6H PRN **OR** ondansetron (ZOFRAN) injection 4 mg, 4 mg, Intravenous, Q6H PRN, Cydney Bruno, APRN    Phosphorus Replacement - Follow Nurse / BPA Driven Protocol, , Does not apply, Daniel DANG Sarah M, MD    Potassium Replacement - Follow Nurse / BPA Driven Protocol, , Does not apply, Daniel DANG Sarah M, MD    sodium chloride 0.9 % flush 10 mL, 10 mL, Intravenous, PRN, Cydney Bruno, APRN    sodium chloride 0.9 % flush 10 mL, 10 mL, Intravenous, Q12H, Cydney Bruno, APRN, 10 mL at 06/20/24 0915    sodium chloride 0.9 % flush 10 mL, 10 mL, Intravenous, PRN, Cydney Bruno, APRN    sodium chloride 0.9 % infusion 40 mL, 40 mL, Intravenous, PRN, Cydney Bruno, APRN    Physical Exam: General elderly female standing up not dyspneic not tachypneic         Neuro: Facial expressions are symmetrical.  She is able to stand under her own power            Psych: Pleasant    Results Review: Patient is a net +1.2 L.  Heart rate is in the 60s.  Blood pressure 1 26-1 56    Radiology Results:  Imaging Results (Last 72 Hours)       Procedure Component Value Units Date/Time    CT Head Without Contrast [[id number]] Collected: 06/19/24 2016     Updated: 06/19/24 2028    Narrative:      CT HEAD WO CONTRAST    Date of Exam: 6/19/2024 8:02 PM EDT    Indication: Syncope.    Comparison: None available.    Technique: Axial CT images were obtained of the head without contrast administration.  Automated exposure control and iterative construction methods were used.      Findings:  There is no evidence of hemorrhage. There is no mass effect or midline shift. Age-related involutional changes are visualized. Bilateral  periventricular white matter hypodensities are visualized compatible with changes of chronic microvascular ischemia.    There is no extra-axial collections.    Ventricles are normal in size and configuration for patient's stated age.    Posterior fossa is within normal limits.    Calvarium and skull base appear intact. Visualized sinuses show no air fluid levels. The mastoid air cells are clear. Visualized orbits are unremarkable.        Impression:      Impression:    No acute intracranial process evident.    Age-related involutional changes and findings compatible with changes of chronic microvascular ischemia.        Electronically Signed: Dmitry Patricio MD    6/19/2024 8:17 PM EDT    Workstation ID: [id number]            EKG: Sinus rhythm    ECHO: Lower limits of normal EF with mild aortic stenosis, significant thickening of the mitral valve leaflets with decreased motion of the posterior leaflet but no significant mitral stenosis.  There is evidence of diastolic dysfunction    Tele: No significant pauses.  There is occasional heart rates in the low 50s    Assessment   Assessment/Plan: Syncope-review of telemetry did not show any significant arrhythmias i.e. no pauses and no significant bradycardia.  Review of her vitals showed no evidence of orthostatic symptoms.  2 valvular heart disease-mild AS is present.  Diastolic dysfunction.  No obvious structural causes of syncope.  I recommend the patient be discharged home with a Zio patch    Jay Aviles MD  06/21/24  07:41 EDT
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
## 2024-06-22 VITALS
HEIGHT: 62 IN | WEIGHT: 113.6 LBS | DIASTOLIC BLOOD PRESSURE: 71 MMHG | BODY MASS INDEX: 20.91 KG/M2 | OXYGEN SATURATION: 96 % | RESPIRATION RATE: 17 BRPM | HEART RATE: 60 BPM | SYSTOLIC BLOOD PRESSURE: 155 MMHG | TEMPERATURE: 98 F

## 2024-06-22 PROCEDURE — 99239 HOSP IP/OBS DSCHRG MGMT >30: CPT | Performed by: INTERNAL MEDICINE

## 2024-06-22 RX ADMIN — APIXABAN 2.5 MG: 2.5 TABLET, FILM COATED ORAL at 08:39

## 2024-06-22 RX ADMIN — AMIODARONE HYDROCHLORIDE 200 MG: 200 TABLET ORAL at 08:39

## 2024-06-22 RX ADMIN — LISINOPRIL 10 MG: 10 TABLET ORAL at 08:39

## 2024-06-22 RX ADMIN — FAMOTIDINE 20 MG: 20 TABLET, FILM COATED ORAL at 08:39

## 2024-06-22 RX ADMIN — SPIRONOLACTONE 25 MG: 25 TABLET ORAL at 08:39

## 2024-06-22 RX ADMIN — Medication 10 ML: at 08:40

## 2024-06-22 NOTE — NURSING NOTE
Patient discharged home via private car with family, A&O x3 denies pain at this time. All paper work went over with son who is POA and patient all questions answered. Personal belongings sent with patient.
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
## 2024-06-24 LAB
QT INTERVAL: 420 MS
QTC INTERVAL: 440 MS

## 2024-06-28 ENCOUNTER — OFFICE VISIT (OUTPATIENT)
Dept: INTERNAL MEDICINE | Facility: CLINIC | Age: 89
End: 2024-06-28
Payer: MEDICARE

## 2024-06-28 VITALS
RESPIRATION RATE: 14 BRPM | WEIGHT: 111 LBS | SYSTOLIC BLOOD PRESSURE: 116 MMHG | HEART RATE: 72 BPM | OXYGEN SATURATION: 97 % | BODY MASS INDEX: 20.43 KG/M2 | DIASTOLIC BLOOD PRESSURE: 72 MMHG | HEIGHT: 62 IN

## 2024-06-28 DIAGNOSIS — D64.9 LOW HEMOGLOBIN AND LOW HEMATOCRIT: ICD-10-CM

## 2024-06-28 DIAGNOSIS — R55 SYNCOPE, UNSPECIFIED SYNCOPE TYPE: ICD-10-CM

## 2024-06-28 DIAGNOSIS — Z76.89 ENCOUNTER FOR SUPPORT AND COORDINATION OF TRANSITION OF CARE: Primary | ICD-10-CM

## 2024-06-28 PROCEDURE — 1160F RVW MEDS BY RX/DR IN RCRD: CPT | Performed by: FAMILY MEDICINE

## 2024-06-28 PROCEDURE — 1159F MED LIST DOCD IN RCRD: CPT | Performed by: FAMILY MEDICINE

## 2024-06-28 PROCEDURE — 99214 OFFICE O/P EST MOD 30 MIN: CPT | Performed by: FAMILY MEDICINE

## 2024-06-28 PROCEDURE — 1126F AMNT PAIN NOTED NONE PRSNT: CPT | Performed by: FAMILY MEDICINE

## 2024-06-28 PROCEDURE — 1111F DSCHRG MED/CURRENT MED MERGE: CPT | Performed by: FAMILY MEDICINE
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
## 2024-06-28 NOTE — PATIENT INSTRUCTIONS
Diagnosis Discussed   Continue to monitor   Labs ordered- will return for fasting labs   Plenty of fluids, monitor diet and exercise   Take medications as instructed  Follow up with Cardiology   Follow up with Neurology   Follow up as directed   If symptoms worsen or persist please seek further evaluation

## 2024-07-08 DIAGNOSIS — I48.91 ATRIAL FIBRILLATION, UNSPECIFIED TYPE: Primary | ICD-10-CM

## 2024-07-08 DIAGNOSIS — Z79.01 CHRONIC ANTICOAGULATION: ICD-10-CM

## 2024-07-08 RX ORDER — APIXABAN 2.5 MG/1
2.5 TABLET, FILM COATED ORAL EVERY 12 HOURS SCHEDULED
Qty: 60 TABLET | Refills: 3 | Status: SHIPPED | OUTPATIENT
Start: 2024-07-08

## 2024-07-12 DIAGNOSIS — R55 SYNCOPE, UNSPECIFIED SYNCOPE TYPE: Primary | ICD-10-CM

## 2024-07-12 DIAGNOSIS — R41.3 MEMORY IMPAIRMENT: ICD-10-CM

## 2024-07-16 ENCOUNTER — LAB (OUTPATIENT)
Dept: LAB | Facility: HOSPITAL | Age: 89
End: 2024-07-16
Payer: MEDICARE

## 2024-07-16 DIAGNOSIS — D64.9 LOW HEMOGLOBIN AND LOW HEMATOCRIT: ICD-10-CM

## 2024-07-16 DIAGNOSIS — R55 SYNCOPE, UNSPECIFIED SYNCOPE TYPE: ICD-10-CM

## 2024-07-16 LAB
FERRITIN SERPL-MCNC: 114 NG/ML (ref 13–150)
FOLATE SERPL-MCNC: >20 NG/ML (ref 4.78–24.2)
IRON 24H UR-MRATE: 73 MCG/DL (ref 37–145)
IRON SATN MFR SERPL: 17 % (ref 20–50)
TIBC SERPL-MCNC: 434 MCG/DL (ref 298–536)
TRANSFERRIN SERPL-MCNC: 291 MG/DL (ref 200–360)
VIT B12 BLD-MCNC: 785 PG/ML (ref 211–946)

## 2024-07-16 PROCEDURE — 82607 VITAMIN B-12: CPT

## 2024-07-16 PROCEDURE — 82728 ASSAY OF FERRITIN: CPT

## 2024-07-16 PROCEDURE — 82746 ASSAY OF FOLIC ACID SERUM: CPT

## 2024-07-16 PROCEDURE — 84466 ASSAY OF TRANSFERRIN: CPT

## 2024-07-16 PROCEDURE — 85025 COMPLETE CBC W/AUTO DIFF WBC: CPT

## 2024-07-16 PROCEDURE — 83540 ASSAY OF IRON: CPT

## 2024-07-17 LAB
BASOPHILS # BLD AUTO: 0.02 10*3/MM3 (ref 0–0.2)
BASOPHILS NFR BLD AUTO: 0.3 % (ref 0–1.5)
DEPRECATED RDW RBC AUTO: 43.2 FL (ref 37–54)
EOSINOPHIL # BLD AUTO: 0.04 10*3/MM3 (ref 0–0.4)
EOSINOPHIL NFR BLD AUTO: 0.7 % (ref 0.3–6.2)
ERYTHROCYTE [DISTWIDTH] IN BLOOD BY AUTOMATED COUNT: 12.2 % (ref 12.3–15.4)
HCT VFR BLD AUTO: 39.2 % (ref 34–46.6)
HGB BLD-MCNC: 13 G/DL (ref 12–15.9)
IMM GRANULOCYTES # BLD AUTO: 0.02 10*3/MM3 (ref 0–0.05)
IMM GRANULOCYTES NFR BLD AUTO: 0.3 % (ref 0–0.5)
LYMPHOCYTES # BLD AUTO: 0.91 10*3/MM3 (ref 0.7–3.1)
LYMPHOCYTES NFR BLD AUTO: 15.2 % (ref 19.6–45.3)
MCH RBC QN AUTO: 32.3 PG (ref 26.6–33)
MCHC RBC AUTO-ENTMCNC: 33.2 G/DL (ref 31.5–35.7)
MCV RBC AUTO: 97.3 FL (ref 79–97)
MONOCYTES # BLD AUTO: 0.47 10*3/MM3 (ref 0.1–0.9)
MONOCYTES NFR BLD AUTO: 7.8 % (ref 5–12)
NEUTROPHILS NFR BLD AUTO: 4.53 10*3/MM3 (ref 1.7–7)
NEUTROPHILS NFR BLD AUTO: 75.7 % (ref 42.7–76)
NRBC BLD AUTO-RTO: 0 /100 WBC (ref 0–0.2)
PLATELET # BLD AUTO: 183 10*3/MM3 (ref 140–450)
PMV BLD AUTO: 10.6 FL (ref 6–12)
RBC # BLD AUTO: 4.03 10*6/MM3 (ref 3.77–5.28)
WBC NRBC COR # BLD AUTO: 5.99 10*3/MM3 (ref 3.4–10.8)

## 2024-07-18 ENCOUNTER — OFFICE VISIT (OUTPATIENT)
Dept: CARDIOLOGY | Facility: HOSPITAL | Age: 89
End: 2024-07-18
Payer: MEDICARE

## 2024-07-18 VITALS
WEIGHT: 113.4 LBS | TEMPERATURE: 97.9 F | HEART RATE: 69 BPM | DIASTOLIC BLOOD PRESSURE: 65 MMHG | RESPIRATION RATE: 16 BRPM | SYSTOLIC BLOOD PRESSURE: 145 MMHG | OXYGEN SATURATION: 97 % | BODY MASS INDEX: 20.87 KG/M2 | HEIGHT: 62 IN

## 2024-07-18 DIAGNOSIS — R55 SYNCOPE AND COLLAPSE: ICD-10-CM

## 2024-07-18 DIAGNOSIS — I10 PRIMARY HYPERTENSION: ICD-10-CM

## 2024-07-18 DIAGNOSIS — I48.0 PAROXYSMAL ATRIAL FIBRILLATION: Primary | ICD-10-CM

## 2024-07-18 DIAGNOSIS — I35.1 AORTIC VALVE INSUFFICIENCY, ETIOLOGY OF CARDIAC VALVE DISEASE UNSPECIFIED: ICD-10-CM

## 2024-07-18 NOTE — PROGRESS NOTES
Chief Complaint  Loss of Consciousness    Subjective      History of Present Illness {  Problem List  Visit  Diagnosis   Encounters  Notes  Medications  Labs  Result Review Imaging  Media :23}     Billie Yu, 88 y.o. female with history of AF, recurrent syncope, PTSD, narcissistic personality disorder, breast cancer at a young age presents to [facility] clinic for Loss of Consciousness.    Patient recently hospitalized at [facility] for evaluation of syncopal episode at dinner 6/19/2024. EEG showed mild slowing only, MRI of the brain was without acute changes, TTE showed preserved EF without critical valvular finding, orthostatic vitals were reported negative, stroke neurology started her on donepezil 5 mg twice daily x 2 weeks with intent increased to 10 mg twice daily at that time and will follow her up in the memory clinic in 2 months. She was seen by cardiology who recommended Zio patch at discharge, I have placed a referral to heart and valve arrhythmia clinic to minimize the risk that she is lost to follow-up.     Presents today doing overall from a cardiac standpoint. Denies recurrent syncope since hospital discharge. Denies cardiac symptoms around time of prior syncopal episodes. Walked recently 3/4 mile with no exertional chest pain or shortness of breath. Family reports at least 6 episodes of syncope over he past few years. Recent syncopal episode while at dinner and was staring/convulsing at the table; reportedly unconscious for 5 minutes. Also a previous episode recently while mopping kitchen. Neurology referral in place and pending.       Objective     Vital Signs:   Vitals:    07/18/24 1251 07/18/24 1252   BP: 143/67 145/65   BP Location: Left arm Left arm   Patient Position: Standing Sitting   Cuff Size: Adult Adult   Pulse: 73 69   Resp:  16   Temp: 97.9 °F (36.6 °C) 97.9 °F (36.6 °C)   TempSrc: Temporal Temporal   SpO2: 98% 97%   Weight:  51.4 kg (113  "lb 6.4 oz)   Height:  157.5 cm (62\")     Body mass index is 20.74 kg/m².  Physical Exam  Vitals and nursing note reviewed.   Constitutional:       Appearance: Normal appearance.   HENT:      Head: Normocephalic.   Eyes:      Extraocular Movements: Extraocular movements intact.   Neck:      Vascular: No carotid bruit.   Cardiovascular:      Rate and Rhythm: Normal rate and regular rhythm.      Pulses: Normal pulses.      Heart sounds: Normal heart sounds, S1 normal and S2 normal. No murmur heard.  Pulmonary:      Effort: Pulmonary effort is normal. No respiratory distress.      Breath sounds: Normal breath sounds.   Musculoskeletal:      Cervical back: Neck supple.      Right lower leg: No edema.      Left lower leg: No edema.   Skin:     General: Skin is warm and dry.   Neurological:      General: No focal deficit present.      Mental Status: She is alert.   Psychiatric:         Mood and Affect: Mood normal.         Behavior: Behavior normal.         Thought Content: Thought content normal.        Data Reviewed:{ Labs  Result Review  Imaging  Med Tab  Media :23}     Ferritin (07/16/2024 13:30)  Iron Profile (07/16/2024 13:30)  CBC & Differential (07/16/2024 13:30)  Basic Metabolic Panel (06/20/2024 05:57)  TSH Rfx On Abnormal To Free T4 (06/19/2024 21:24)  D-dimer, Quantitative (06/19/2024 19:00)  CT Head Without Contrast (06/19/2024 20:11)  MRI Brain With & Without Contrast (06/21/2024 16:34)  Adult Transthoracic Echo Complete With Contrast if Necessary Per Protocol (06/20/2024 16:03)   ECG 12 Lead QT Measurement (06/20/2024 18:08)       Assessment & Plan   Assessment and Plan {CC Problem List  Visit Diagnosis  ROS  Review (Popup)  Health Maintenance  Quality  BestPractice  Medications  SmartSets  SnapShot Encounters  Media :23}     1. Paroxysmal atrial fibrillation  -History of paroxysmal AF and maintained on amiodarone daily  -Denies recent palpitations/tachypalpitation  -ECG during " hospitalization with normal sinus rhythm, stable QT/Qtc  -[id number]  -Continue apixaban 2.5 Mg every 12 hours  -Continue low-dose amiodarone for now  - Ambulatory Referral to Cardiology to establish care after recently relocating from [place]    2. Syncope and collapse  -Recent recurrent syncope prompted ED evaluation  -Denies cardiac symptoms around time of previous syncopal episodes  -Denies recurrent syncope since hospitalization  -Discussed maintaining adequate hydration, regular nutritional intake  -Continue with plan neurology establishing care    3. Aortic valve insufficiency, etiology of cardiac valve disease unspecified  -TTE during hospitalization with LVEF 51-55% moderate aortic regurgitation noted on echocardiogram  -Afterload reasonably controlled today, reports generally well-controlled on ambulatory monitoring  - Ambulatory Referral to Cardiology for surveillance      Follow Up {Instructions Charge Capture  Follow-up Communications :23}     Return if symptoms worsen or fail to improve.      Patient was given instructions and counseling regarding her condition or for health maintenance advice. Please see specific information pulled into the AVS if appropriate.  Patient was instructed to call the [facility] Center with any questions, concerns, or worsening symptoms.    Dictated Utilizing Dragon Dictation   Please note that portions of this note were completed with a voice recognition program.   Part of this note may be an electronic transcription/translation of spoken language to printed text using the Dragon Dictation System.

## 2024-07-23 RX ORDER — SPIRONOLACTONE 25 MG/1
25 TABLET ORAL EVERY MORNING
Qty: 30 TABLET | Refills: 0 | Status: SHIPPED | OUTPATIENT
Start: 2024-07-23

## 2024-07-23 RX ORDER — AMIODARONE HYDROCHLORIDE 200 MG/1
200 TABLET ORAL DAILY
Qty: 30 TABLET | Refills: 0 | Status: SHIPPED | OUTPATIENT
Start: 2024-07-23

## 2024-08-08 ENCOUNTER — OFFICE VISIT (OUTPATIENT)
Dept: CARDIOLOGY | Facility: CLINIC | Age: 89
End: 2024-08-08
Payer: MEDICARE

## 2024-08-08 VITALS
OXYGEN SATURATION: 95 % | SYSTOLIC BLOOD PRESSURE: 110 MMHG | DIASTOLIC BLOOD PRESSURE: 70 MMHG | HEART RATE: 64 BPM | BODY MASS INDEX: 20.06 KG/M2 | HEIGHT: 62 IN | WEIGHT: 109 LBS

## 2024-08-08 DIAGNOSIS — R55 SYNCOPE, UNSPECIFIED SYNCOPE TYPE: ICD-10-CM

## 2024-08-08 DIAGNOSIS — I48.0 PAF (PAROXYSMAL ATRIAL FIBRILLATION): Primary | ICD-10-CM

## 2024-08-08 DIAGNOSIS — I48.91 ATRIAL FIBRILLATION, UNSPECIFIED TYPE: ICD-10-CM

## 2024-08-08 DIAGNOSIS — I35.0 AORTIC VALVE STENOSIS, ETIOLOGY OF CARDIAC VALVE DISEASE UNSPECIFIED: ICD-10-CM

## 2024-08-08 DIAGNOSIS — Z79.01 CHRONIC ANTICOAGULATION: ICD-10-CM

## 2024-08-08 RX ORDER — SPIRONOLACTONE 25 MG/1
25 TABLET ORAL EVERY MORNING
Qty: 90 TABLET | Refills: 0 | Status: SHIPPED | OUTPATIENT
Start: 2024-08-08

## 2024-08-08 RX ORDER — AMIODARONE HYDROCHLORIDE 200 MG/1
200 TABLET ORAL DAILY
Qty: 90 TABLET | Refills: 3 | Status: SHIPPED | OUTPATIENT
Start: 2024-08-08

## 2024-08-08 RX ORDER — APIXABAN 2.5 MG/1
2.5 TABLET, FILM COATED ORAL EVERY 12 HOURS SCHEDULED
Qty: 180 TABLET | Refills: 3 | Status: SHIPPED | OUTPATIENT
Start: 2024-08-08

## 2024-08-08 NOTE — PROGRESS NOTES
OFFICE VISIT  NOTE  [facility] CARDIOLOGY      Name: Billie Yu    Date: 2024  MRN:  [id number]  :  1935      REFERRING/PRIMARY PROVIDER:  Elvi Ventura MD    Chief Complaint   Patient presents with    Paroxysmal atrial fibrillation       HPI: Billie Yu is a 88 y.o. female who presents for paroxysmal atrial fibrillation, moderate aortic regurgitation and mild aortic stenosis.  Echo 2024 showed mild aortic stenosis peak velocity 2.7 m/s, mean gradient 17 mmHg.  EF low limits of normal.  Admitted to [facility] 2024 with syncope and A-fib.  History of breast cancer, IRIS, declined testing.  Multiple syncopal episodes over the past several years with multiple Holter monitors in [place] that were negative per patient.  No recent syncope since hospitalization 2024    Past Medical History:   Diagnosis Date    Atrial fibrillation     Hypertension     Memory loss        Past Surgical History:   Procedure Laterality Date    BLADDER AUGMENTATION      BREAST SURGERY      HIP FRACTURE SURGERY Left     HYSTERECTOMY      MASTECTOMY Right     WRIST ARTHROPLASTY Left        Social History     Socioeconomic History    Marital status:    Tobacco Use    Smoking status: Never    Smokeless tobacco: Never   Vaping Use    Vaping status: Never Used   Substance and Sexual Activity    Alcohol use: Yes     Alcohol/week: 14.0 standard drinks of alcohol     Types: 10 Glasses of wine, 4 Drinks containing 0.5 oz of alcohol per week    Drug use: Never    Sexual activity: Not Currently     Partners: Male     Birth control/protection: Vasectomy       Family History   Problem Relation Age of Onset    Stroke Mother     Hyperlipidemia Father     Hypertension Father     Heart disease Father     Hiatal hernia Father     Heart disease Brother     Hyperlipidemia Brother     Hypertension Brother         ROS:   Constitutional no fever,  no weight loss   Skin no rash, no subcutaneous nodules   Otolaryngeal  "no difficulty swallowing   Cardiovascular See HPI   Pulmonary no cough, no sputum production   Gastrointestinal no constipation, no diarrhea   Genitourinary no dysuria, no hematuria   Hematologic no easy bruisability, no abnormal bleeding   Musculoskeletal no muscle pain   Neurologic no dizziness, no falls         No Known Allergies      Current Outpatient Medications:     amiodarone (PACERONE) 200 MG tablet, Take 1 tablet by mouth Daily., Disp: 30 tablet, Rfl: 0    Eliquis 2.5 MG tablet tablet, Take 1 tablet by mouth Every 12 (Twelve) Hours., Disp: 60 tablet, Rfl: 3    lisinopril (PRINIVIL,ZESTRIL) 10 MG tablet, Take 1 tablet by mouth Daily., Disp: , Rfl:     spironolactone (ALDACTONE) 25 MG tablet, Take 1 tablet by mouth Every Morning., Disp: 30 tablet, Rfl: 0    donepezil (ARICEPT) 5 MG tablet, Take 1 tablet by mouth Every Night for 14 days, THEN 2 tablets Every Night for 30 days., Disp: 74 tablet, Rfl: 0    furosemide (LASIX) 20 MG tablet, Take 1 tablet by mouth Daily As Needed (lower extremity swelling)., Disp: , Rfl:     Vitals:    08/08/24 1048   BP: 110/70   Pulse: 64   SpO2: 95%   Weight: 49.4 kg (109 lb)   Height: 157.5 cm (62\")     Body mass index is 19.94 kg/m².    PHYSICAL EXAM:    General Appearance:   well developed  well nourished  HENT:   oropharynx moist  lips not cyanotic  Neck:  thyroid not enlarged  supple  Respiratory:  no respiratory distress  normal breath sounds  no rales  Cardiovascular:  no jugular venous distention  regular rhythm  apical impulse normal  S1 normal, S2 normal  no S3, no S4   3/6 systolic murmur  no rub, no thrill  carotid pulses normal; no bruit  lower extremity edema: none      Musculoskeletal:  no clubbing of fingers.   normocephalic, head atraumatic  Skin:   warm, dry  Psychiatric:  judgement and insight appropriate  normal mood and affect    RESULTS:   Procedures    Results for orders placed during the hospital encounter of 06/19/24    Adult Transthoracic Echo Complete " "With Contrast if Necessary Per Protocol    Interpretation Summary    Left ventricular ejection fraction appears to be in the lower limits of normal    Left atrial volume is moderately increased.    The right atrial cavity is borderline dilated.    Moderate aortic valve regurgitation is present.    Mild aortic valve stenosis is present. Aortic valve area is 1.2 cm2.    Peak velocity of the flow distal to the aortic valve is 277 cm/s. Aortic valve maximum pressure gradient is 31 mmHg. Aortic valve mean pressure gradient is 17 mmHg. Aortic valve dimensionless index is 0.39 .    Estimated right ventricular systolic pressure from tricuspid regurgitation is mildly elevated (35-45 mmHg).        Labs:  Lab Results   Component Value Date    AST 16 06/19/2024    ALT 14 06/19/2024     Lab Results   Component Value Date    HGBA1C 5.60 06/19/2024     No components found for: \"CREATINININE\"  No results found for: \"EGFRIFNONA\"    Most recent PCP note, imaging tests, and labs reviewed.    ASSESSMENT:  Problem List Items Addressed This Visit       PAF (paroxysmal atrial fibrillation) - Primary    Chronic anticoagulation       PLAN:    1.  Syncope:  I recommend loop recorder if she has another event she will call us  Increase hydration  Cautious with diuretics.    2.  Mild aortic stenosis with moderate aortic regurgitation:  Repeat echo in 1 year  Relatively asymptomatic, I do not feel that is contributing to syncope.    3.  Paroxysmal atrial fibrillation:  Continue amiodarone she has been on it for several years and tolerating it well  Repeat CMP, thyroid function studies every 6 months and eye exam and chest x-ray yearly  Continue Eliquis refill today  If she has recurrent syncope and falls may need to consider Watchman device    Advance Care Planning   ACP discussion was held with the patient during this visit. Patient has an advance directive (not in EMR), copy requested.         Return to clinic in 9 months, or sooner as " needed.    Thank you for the opportunity to share in the care of your patient; please do not hesitate to call me with any questions.     Jay Clemente MD, [facility], [facility]  Office: [phone] [address] [place] [address]    08/08/24

## 2024-08-13 ENCOUNTER — OFFICE VISIT (OUTPATIENT)
Dept: INTERNAL MEDICINE | Facility: CLINIC | Age: 89
End: 2024-08-13
Payer: MEDICARE

## 2024-08-13 VITALS
TEMPERATURE: 99.2 F | SYSTOLIC BLOOD PRESSURE: 128 MMHG | WEIGHT: 112 LBS | OXYGEN SATURATION: 96 % | HEIGHT: 62 IN | DIASTOLIC BLOOD PRESSURE: 54 MMHG | BODY MASS INDEX: 20.61 KG/M2

## 2024-08-13 DIAGNOSIS — I48.0 PAF (PAROXYSMAL ATRIAL FIBRILLATION): ICD-10-CM

## 2024-08-13 DIAGNOSIS — Z79.01 CHRONIC ANTICOAGULATION: ICD-10-CM

## 2024-08-13 DIAGNOSIS — R55 SYNCOPE, UNSPECIFIED SYNCOPE TYPE: ICD-10-CM

## 2024-08-13 DIAGNOSIS — Z76.89 ENCOUNTER TO ESTABLISH CARE WITH NEW DOCTOR: Primary | ICD-10-CM

## 2024-08-13 PROBLEM — R41.3 MEMORY IMPAIRMENT: Status: ACTIVE | Noted: 2024-06-20

## 2024-08-13 PROBLEM — Z85.3 HX OF BREAST CANCER: Status: ACTIVE | Noted: 2024-08-13

## 2024-08-13 PROCEDURE — 1160F RVW MEDS BY RX/DR IN RCRD: CPT | Performed by: FAMILY MEDICINE

## 2024-08-13 PROCEDURE — 1159F MED LIST DOCD IN RCRD: CPT | Performed by: FAMILY MEDICINE

## 2024-08-13 PROCEDURE — 99214 OFFICE O/P EST MOD 30 MIN: CPT | Performed by: FAMILY MEDICINE

## 2024-08-13 PROCEDURE — 1126F AMNT PAIN NOTED NONE PRSNT: CPT | Performed by: FAMILY MEDICINE

## 2024-08-13 NOTE — PROGRESS NOTES
Office Note     Name: Billie Yu    : 1935     MRN: [id number]     Chief Complaint  Establish Care, Atrial Fibrillation (STARTED 2 YEARS AGO), and Fatigue    Subjective     History of Present Illness:  Billie Yu is a 88 y.o. female who presents today for establish care   Seen by cardiology recommended- loop recorder if reoccurrence of syncope   Increase hydration and caution with diuretics   Repeat Echo in 1 year   Chest xray yearly   Repeat CMP, thyroid testing every 6 months   May consider Watchman device and recurrent syncope    Does wake 1-2 times per night to urinate   Is drinking more water     Concerns for constipation- will try to drink water- has been using stool softener   Neurology appointment scheduled- will follow up     PMH-  Afib   Chronic anticoagulation   PTSD   Syncope   Forgetfulness/memory impairment   Hx of breast cancer     Meds-  Amiodarone 200mg daily   Donepezil 10mg nightly   Eliquis 2.5mg twice a day   Lisinopril 10mg daily   Spironolactone 25mg daily     Alcohol 14 per week   Smoking - none   Drug use - none   Job - retired   Stress 3/10   Sun Exposure - protects skin, visors- no dermatology       Dental/Eye exams - up to date.  Vision is stable. Hx of cataract surgery   Diet/Exercise- Diet- Healthy- moderate. Does cook. Eats a lot of freezer dinners   Exercise- stays active- moves around as much as possible     Colonoscopy/Cologuard - no longer required      Immunizations  Tdap- unknown   Flu- postponed   Shingles- will check record   COVID- postponed   PNA- will check record     Review of Systems:   Review of Systems   Constitutional:  Negative for chills and fever.   Eyes:  Negative for visual disturbance.   Respiratory:  Negative for cough, chest tightness, shortness of breath and wheezing.    Cardiovascular:  Negative for chest pain, palpitations and leg swelling.   Gastrointestinal:  Positive for constipation. Negative for abdominal pain, blood in stool,  diarrhea, nausea and vomiting.   Genitourinary:  Negative for dysuria.   Musculoskeletal:  Positive for arthralgias, gait problem and myalgias.   Skin:  Negative for rash.   Neurological:  Positive for memory problem. Negative for light-headedness and headache.   Psychiatric/Behavioral:  Positive for dysphoric mood.        Past Medical History:   Past Medical History:   Diagnosis Date    Atrial fibrillation     Hypertension     Memory loss        Past Surgical History:   Past Surgical History:   Procedure Laterality Date    BLADDER AUGMENTATION      BREAST SURGERY      HIP FRACTURE SURGERY Left 2022    HYSTERECTOMY      MASTECTOMY Right     WRIST ARTHROPLASTY Left        Immunizations:   There is no immunization history on file for this patient.     Medications:     Current Outpatient Medications:     amiodarone (PACERONE) 200 MG tablet, Take 1 tablet by mouth Daily., Disp: 90 tablet, Rfl: 3    donepezil (ARICEPT) 5 MG tablet, Take 1 tablet by mouth Every Night for 14 days, THEN 2 tablets Every Night for 30 days., Disp: 74 tablet, Rfl: 0    Eliquis 2.5 MG tablet tablet, Take 1 tablet by mouth Every 12 (Twelve) Hours., Disp: 180 tablet, Rfl: 3    lisinopril (PRINIVIL,ZESTRIL) 10 MG tablet, Take 1 tablet by mouth Daily., Disp: , Rfl:     spironolactone (ALDACTONE) 25 MG tablet, Take 1 tablet by mouth Every Morning., Disp: 90 tablet, Rfl: 0    Allergies:   No Known Allergies    Family History:   Family History   Problem Relation Age of Onset    Stroke Mother     Hyperlipidemia Father     Hypertension Father     Heart disease Father     Hiatal hernia Father     Heart disease Brother     Hyperlipidemia Brother     Hypertension Brother        Social History:   Social History     Socioeconomic History    Marital status:    Tobacco Use    Smoking status: Never    Smokeless tobacco: Never   Vaping Use    Vaping status: Never Used   Substance and Sexual Activity    Alcohol use: Yes     Alcohol/week: 14.0 standard  "drinks of alcohol     Types: 10 Glasses of wine, 4 Drinks containing 0.5 oz of alcohol per week    Drug use: Never    Sexual activity: Not Currently     Partners: Male     Birth control/protection: Vasectomy         Objective     Vital Signs  /54 (BP Location: Left arm, Patient Position: Sitting, Cuff Size: Adult)   Temp 99.2 °F (37.3 °C) (Temporal)   Ht 157.5 cm (62.01\")   Wt 50.8 kg (112 lb)   SpO2 96%   BMI 20.48 kg/m²   Estimated body mass index is 20.48 kg/m² as calculated from the following:    Height as of this encounter: 157.5 cm (62.01\").    Weight as of this encounter: 50.8 kg (112 lb).    BMI is within normal parameters. No other follow-up for BMI required.      Physical Exam  Vitals and nursing note reviewed.   Constitutional:       General: She is not in acute distress.     Appearance: Normal appearance.   HENT:      Head: Normocephalic and atraumatic.   Cardiovascular:      Rate and Rhythm: Normal rate and regular rhythm.      Heart sounds: Murmur heard.      Systolic murmur is present.   Pulmonary:      Effort: Pulmonary effort is normal. No respiratory distress.      Breath sounds: Normal breath sounds.   Musculoskeletal:         General: Normal range of motion.      Comments: Walker for stability   Moving all extremities    Skin:     General: Skin is warm and dry.   Neurological:      General: No focal deficit present.      Mental Status: She is alert and oriented to person, place, and time.          Procedures     Assessment and Plan   Diagnosis Discussed   Continue to monitor   Plenty of fluids, monitor diet and exercise   Follow up with Cardiology   Follow up with Neurology   Take medications as instructed  Follow up as directed   If symptoms worsen or persist please seek further evaluation     1. Encounter to establish care with new doctor  Reviewed PMH, medications and specialists   Requesting additional medical records from [place]    2. PAF (paroxysmal atrial " fibrillation)  Stable. Established with cardiology  Continue current medications as prescribed     3. Chronic anticoagulation  Stable. Continue Eliquis 2.5mg twice a day     4. Syncope, unspecified syncope type  Will continue to monitor. Denies new episodes   Follow up with Neurology as directed.        Follow Up  Return in about 2 months (around 10/13/2024), or if symptoms worsen or fail to improve, for Medicare Wellness, fasting labs.    Elvi Ventura MD  [facility] INTERNAL MEDICINE  [address] [place] [id number] [phone]

## 2024-08-13 NOTE — PATIENT INSTRUCTIONS
Diagnosis Discussed   Continue to monitor   Plenty of fluids, monitor diet and exercise   Follow up with Cardiology   Follow up with Neurology   Take medications as instructed  Follow up as directed   If symptoms worsen or persist please seek further evaluation

## 2024-10-10 ENCOUNTER — OFFICE VISIT (OUTPATIENT)
Dept: NEUROLOGY | Facility: CLINIC | Age: 89
End: 2024-10-10
Payer: MEDICARE

## 2024-10-10 VITALS
SYSTOLIC BLOOD PRESSURE: 122 MMHG | OXYGEN SATURATION: 95 % | HEIGHT: 62 IN | DIASTOLIC BLOOD PRESSURE: 70 MMHG | BODY MASS INDEX: 20.24 KG/M2 | WEIGHT: 110 LBS | HEART RATE: 53 BPM

## 2024-10-10 DIAGNOSIS — R46.89 COGNITIVE AND BEHAVIORAL CHANGES: Primary | ICD-10-CM

## 2024-10-10 DIAGNOSIS — R41.89 COGNITIVE AND BEHAVIORAL CHANGES: Primary | ICD-10-CM

## 2024-10-10 PROCEDURE — 99214 OFFICE O/P EST MOD 30 MIN: CPT | Performed by: NURSE PRACTITIONER

## 2024-10-10 PROCEDURE — 1159F MED LIST DOCD IN RCRD: CPT | Performed by: NURSE PRACTITIONER

## 2024-10-10 PROCEDURE — 1160F RVW MEDS BY RX/DR IN RCRD: CPT | Performed by: NURSE PRACTITIONER

## 2024-10-10 RX ORDER — DONEPEZIL HYDROCHLORIDE 10 MG/1
10 TABLET, FILM COATED ORAL NIGHTLY
Qty: 90 TABLET | Refills: 3 | Status: SHIPPED | OUTPATIENT
Start: 2024-10-10 | End: 2025-10-10
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
## 2024-10-14 ENCOUNTER — OFFICE VISIT (OUTPATIENT)
Dept: INTERNAL MEDICINE | Facility: CLINIC | Age: 89
End: 2024-10-14
Payer: MEDICARE

## 2024-10-14 VITALS
RESPIRATION RATE: 18 BRPM | SYSTOLIC BLOOD PRESSURE: 122 MMHG | WEIGHT: 109.8 LBS | HEIGHT: 59 IN | OXYGEN SATURATION: 97 % | DIASTOLIC BLOOD PRESSURE: 70 MMHG | HEART RATE: 97 BPM | BODY MASS INDEX: 22.13 KG/M2 | TEMPERATURE: 97.6 F

## 2024-10-14 DIAGNOSIS — I48.0 PAF (PAROXYSMAL ATRIAL FIBRILLATION): ICD-10-CM

## 2024-10-14 DIAGNOSIS — R74.9 ABNORMAL SERUM ENZYME LEVEL, UNSPECIFIED: ICD-10-CM

## 2024-10-14 DIAGNOSIS — Z79.01 CHRONIC ANTICOAGULATION: ICD-10-CM

## 2024-10-14 DIAGNOSIS — Z23 NEED FOR VACCINATION: ICD-10-CM

## 2024-10-14 DIAGNOSIS — R55 SYNCOPE, UNSPECIFIED SYNCOPE TYPE: ICD-10-CM

## 2024-10-14 DIAGNOSIS — Z23 NEED FOR INFLUENZA VACCINATION: ICD-10-CM

## 2024-10-14 DIAGNOSIS — Z13.220 SCREENING, LIPID: ICD-10-CM

## 2024-10-14 DIAGNOSIS — Z00.00 MEDICARE ANNUAL WELLNESS VISIT, SUBSEQUENT: Primary | ICD-10-CM

## 2024-10-14 DIAGNOSIS — R73.01 ELEVATED FASTING BLOOD SUGAR: ICD-10-CM

## 2024-10-14 PROBLEM — K59.09 OTHER CONSTIPATION: Status: ACTIVE | Noted: 2024-10-14

## 2024-10-14 PROCEDURE — 90480 ADMN SARSCOV2 VAC 1/ONLY CMP: CPT | Performed by: FAMILY MEDICINE

## 2024-10-14 PROCEDURE — G0439 PPPS, SUBSEQ VISIT: HCPCS | Performed by: FAMILY MEDICINE

## 2024-10-14 PROCEDURE — 90662 IIV NO PRSV INCREASED AG IM: CPT | Performed by: FAMILY MEDICINE

## 2024-10-14 PROCEDURE — 91320 SARSCV2 VAC 30MCG TRS-SUC IM: CPT | Performed by: FAMILY MEDICINE

## 2024-10-14 PROCEDURE — 1126F AMNT PAIN NOTED NONE PRSNT: CPT | Performed by: FAMILY MEDICINE

## 2024-10-14 PROCEDURE — 1159F MED LIST DOCD IN RCRD: CPT | Performed by: FAMILY MEDICINE

## 2024-10-14 PROCEDURE — 1160F RVW MEDS BY RX/DR IN RCRD: CPT | Performed by: FAMILY MEDICINE

## 2024-10-14 PROCEDURE — G0008 ADMIN INFLUENZA VIRUS VAC: HCPCS | Performed by: FAMILY MEDICINE

## 2024-10-14 NOTE — PATIENT INSTRUCTIONS
Diagnosis Discussed   Continue to monitor   Plenty of fluids, monitor diet and exercise   Labs ordered will notify of results   Immunizations up to date - check on PNA   Follow up with Cardiology   Follow up with Neurology   Take medications as instructed  Follow up as directed   If symptoms worsen or persist please seek further evaluation

## 2024-10-14 NOTE — PROGRESS NOTES
Subjective   The ABCs of the Annual Wellness Visit  Medicare Wellness Visit      Billie Yu is a 88 y.o. patient who presents for a Medicare Wellness Visit.    Chronic constipation- does eat well- green veggies. Feels like diet is good   Stool softener daily   Does have regular bowel movements   Continue with miralax as directed     PMH-  Afib   Chronic anticoagulation   PTSD   Syncope   Forgetfulness/memory impairment   Hx of breast cancer      Meds-  Amiodarone 200mg daily   Donepezil 10mg nightly   Eliquis 2.5mg twice a day   Lisinopril 10mg daily   Spironolactone 25mg daily   Multivitamin      Alcohol 14 per week   Smoking - none   Drug use - none   Job - retired   Stress 3/10   Sun Exposure - protects skin, visors- no dermatology      Dental/Eye exams - up to date.  Vision is stable. Hx of cataract surgery   Diet/Exercise- Diet- Healthy- moderate. Does cook. Eats a lot of freezer dinners   Exercise- stays active- moves around as much as possible   Will try to  drinking more water      Colonoscopy/Cologuard - no longer required       Immunizations  Tdap- unknown   Flu- today   Shingles- will check record   COVID- today   PNA- will check record -     The following portions of the patient's history were reviewed and   updated as appropriate: allergies, current medications, past family history, past medical history, past social history, past surgical history, and problem list.    Compared to one year ago, the patient's physical   health is better.  Compared to one year ago, the patient's mental   health is the same. Per patient seems to be better   Does have impairments     Recent Hospitalizations:  This patient has had a [facility] admission record on file within the last 365 days.  Current Medical Providers:  Patient Care Team:  Elvi Ventura MD as PCP - General (Internal Medicine)  [facility] as Consulting Physician (Cardiology)  Jay Clemente MD as Consulting  "Physician (Cardiology)  Lauryn Mejias APRN as Nurse Practitioner (Neurology)    Outpatient Medications Prior to Visit   Medication Sig Dispense Refill    amiodarone (PACERONE) 200 MG tablet Take 1 tablet by mouth Daily. 90 tablet 3    donepezil (Aricept) 10 MG tablet Take 1 tablet by mouth Every Night. 90 tablet 3    Eliquis 2.5 MG tablet tablet Take 1 tablet by mouth Every 12 (Twelve) Hours. 180 tablet 3    lisinopril (PRINIVIL,ZESTRIL) 10 MG tablet Take 1 tablet by mouth Daily.      spironolactone (ALDACTONE) 25 MG tablet Take 1 tablet by mouth Every Morning. 90 tablet 0    donepezil (ARICEPT) 5 MG tablet Take 1 tablet by mouth Every Night for 14 days, THEN 2 tablets Every Night for 30 days. 74 tablet 0     No facility-administered medications prior to visit.     No opioid medication identified on active medication list. I have reviewed chart for other potential  high risk medication/s and harmful drug interactions in the elderly.      Aspirin is not on active medication list.  Aspirin use is not indicated based on review of current medical condition/s. Risk of harm outweighs potential benefits.  .    Patient Active Problem List   Diagnosis    Syncope    PAF (paroxysmal atrial fibrillation)    Chronic anticoagulation    Narcissistic personality disorder    PTSD (post-traumatic stress disorder)    Memory impairment    Snoring    Hx of breast cancer    Other constipation     Advance Care Planning Advance Directive is on file.  ACP discussion was held with the patient during this visit. Patient has an advance directive in EMR which is still valid.         Objective   Vitals:    10/14/24 1403   BP: 122/70   Pulse: 97   Resp: 18   Temp: 97.6 °F (36.4 °C)   TempSrc: Temporal   SpO2: 97%   Weight: 49.8 kg (109 lb 12.8 oz)   Height: 149 cm (58.66\")   PainSc: 0-No pain       Estimated body mass index is 22.43 kg/m² as calculated from the following:    Height as of this encounter: 149 cm (58.66\").    Weight as of " this encounter: 49.8 kg (109 lb 12.8 oz).    BMI is within normal parameters. No other follow-up for BMI required.       Does the patient have evidence of cognitive impairment? Yes                                                                                                Health  Risk Assessment    Smoking Status:  Social History     Tobacco Use   Smoking Status Never   Smokeless Tobacco Never     Alcohol Consumption:  Social History     Substance and Sexual Activity   Alcohol Use Yes    Alcohol/week: 14.0 standard drinks of alcohol    Types: 10 Glasses of wine, 4 Drinks containing 0.5 oz of alcohol per week       Fall Risk Screen  STEADI Fall Risk Assessment was completed, and patient is at HIGH risk for falls. Assessment completed on:10/14/2024    Depression Screening:      10/14/2024     2:11 PM   PHQ-2/PHQ-9 Depression Screening   Little interest or pleasure in doing things Not at all   Feeling down, depressed, or hopeless Not at all     Health Habits and Functional and Cognitive Screening:      10/14/2024     2:16 PM   Functional & Cognitive Status   Do you have difficulty preparing food and eating? No   Do you have difficulty bathing yourself, getting dressed or grooming yourself? No   Do you have difficulty using the toilet? No   Do you have difficulty moving around from place to place? Yes   Do you have trouble with steps or getting out of a bed or a chair? Yes   Current Diet Well Balanced Diet   Dental Exam Up to date   Eye Exam Up to date   Exercise (times per week) 2 times per week   Current Exercises Include Walking   Do you need help using the phone?  No   Are you deaf or do you have serious difficulty hearing?  Yes   Do you need help to go to places out of walking distance? Yes   Do you need help shopping? No   Do you need help preparing meals?  No   Do you need help with housework?  Yes   Do you need help with laundry? No   Do you need help taking your medications? No   Do you need help managing  money? Yes   Do you ever drive or ride in a car without wearing a seat belt? Yes   Have you felt unusual stress, anger or loneliness in the last month? No   Who do you live with? Alone   If you need help, do you have trouble finding someone available to you? No   Have you been bothered in the last four weeks by sexual problems? No   Do you have difficulty concentrating, remembering or making decisions? Yes           Age-appropriate Screening Schedule:  Refer to the list below for future screening recommendations based on patient's age, sex and/or medical conditions. Orders for these recommended tests are listed in the plan section. The patient has been provided with a written plan.    Health Maintenance List  Health Maintenance   Topic Date Due    DXA SCAN  Never done    ZOSTER VACCINE (1 of 2) Never done    Pneumococcal Vaccine 65+ (1 of 1 - PCV) Never done    TDAP/TD VACCINES (1 - Tdap) 12/19/2024 (Originally 12/20/1954)    RSV Vaccine - Adults (1 - 1-dose 75+ series) 06/28/2025 (Originally 12/20/2012)    COVID-19 Vaccine (2 - 2023-24 season) 02/14/2025    ANNUAL WELLNESS VISIT  10/14/2025    INFLUENZA VACCINE  Completed                                                                                                                                                CMS Preventative Services Quick Reference  Risk Factors Identified During Encounter  Fall Risk-High or Moderate: Discussed Fall Prevention in the home, Information on Fall Prevention Shared in After Visit Summary, and Sit to Stand Exercise Information Shared in After Visit Summary    The above risks/problems have been discussed with the patient.  Pertinent information has been shared with the patient in the After Visit Summary.  An After Visit Summary and PPPS were made available to the patient.    Follow Up:   Next Medicare Wellness visit to be scheduled in 1 year.         Additional E&M Note during same encounter follows:  Patient has additional,  "significant, and separately identifiable condition(s)/problem(s) that require work above and beyond the Medicare Wellness Visit     Chief Complaint  Medicare Wellness-subsequent    Subjective   HPI  Billie is also being seen today for an annual adult preventative physical exam.     Review of Systems   Constitutional:  Negative for chills and fever.   HENT:  Negative for trouble swallowing and voice change.    Eyes:  Negative for visual disturbance.   Respiratory:  Negative for cough, chest tightness, shortness of breath and wheezing.    Cardiovascular:  Negative for chest pain, palpitations and leg swelling.   Gastrointestinal:  Positive for constipation. Negative for abdominal pain, blood in stool, diarrhea, nausea and vomiting.   Genitourinary:  Negative for dysuria.   Musculoskeletal:  Negative for gait problem.   Skin:  Negative for rash.   Neurological:  Positive for syncope and confusion. Negative for light-headedness.   Psychiatric/Behavioral:  Positive for decreased concentration and dysphoric mood. Negative for self-injury and suicidal ideas. The patient is nervous/anxious.               Objective   Vital Signs:  /70   Pulse 97   Temp 97.6 °F (36.4 °C) (Temporal)   Resp 18   Ht 149 cm (58.66\")   Wt 49.8 kg (109 lb 12.8 oz)   SpO2 97%   BMI 22.43 kg/m²   Physical Exam  Vitals and nursing note reviewed.   Constitutional:       General: She is not in acute distress.     Appearance: Normal appearance.   HENT:      Head: Normocephalic and atraumatic.      Mouth/Throat:      Mouth: Mucous membranes are moist.   Eyes:      Extraocular Movements: Extraocular movements intact.      Conjunctiva/sclera: Conjunctivae normal.      Pupils: Pupils are equal, round, and reactive to light.   Cardiovascular:      Rate and Rhythm: Normal rate and regular rhythm.      Heart sounds: Normal heart sounds.   Pulmonary:      Effort: Pulmonary effort is normal. No respiratory distress.      Breath sounds: Normal breath " sounds.   Abdominal:      General: Bowel sounds are normal.      Palpations: Abdomen is soft.   Musculoskeletal:         General: Normal range of motion.      Comments: Moving all extremities    Skin:     General: Skin is warm and dry.   Neurological:      Mental Status: She is alert.   Psychiatric:         Attention and Perception: Attention normal.         Mood and Affect: Mood is anxious and depressed.         Speech: Speech normal. Speech is tangential.         Behavior: Behavior normal. Behavior is cooperative.         Thought Content: Thought content does not include homicidal or suicidal ideation. Thought content does not include homicidal or suicidal plan.         The following data was reviewed by: Elvi Ventura MD on 10/14/2024:        Assessment and Plan Additional age appropriate preventative wellness advice topics were discussed during today's preventative wellness exam(some topics already addressed during AWV portion of the note above):    Physical Activity: Advised cardiovascular activity 150 minutes per week as tolerated. (example brisk walk for 30 minutes, 5 days a week).     Nutrition: Discussed nutrition plan with patient. Information shared in after visit summary. Goal is for a well balanced diet to enhance overall health.     Healthy Weight: Discussed current and goal BMI with patient. Steps to attain this goal discussed. Information shared in after visit summary.     Injury Prevention Discussion:  Information shared in after visit summary.       Medicare annual wellness visit, subsequent  Diagnosis Discussed   Continue to monitor   Plenty of fluids, monitor diet and exercise   Labs ordered will notify of results   Immunizations up to date - check on PNA   Follow up with Cardiology   Follow up with Neurology   Take medications as instructed  Follow up as directed   If symptoms worsen or persist please seek further evaluation    Need for influenza vaccination    Need for vaccination    PAF  (paroxysmal atrial fibrillation)    Chronic anticoagulation    Syncope, unspecified syncope type    Screening, lipid    Elevated fasting blood sugar    Abnormal serum enzyme level, unspecified      Orders Placed This Encounter   Procedures    Fluzone High-Dose 65+yrs    COVID-19 (Pfizer) 12yrs+ (COMIRNATY)    CBC (No Diff)     Standing Status:   Future     Standing Expiration Date:   10/14/2025     Order Specific Question:   Release to patient     Answer:   Routine Release [[id number]]    Comprehensive Metabolic Panel     Standing Status:   Future     Standing Expiration Date:   10/14/2025     Order Specific Question:   Release to patient     Answer:   Routine Release [[id number]]    Lipid Panel     Standing Status:   Future     Standing Expiration Date:   10/14/2025     Order Specific Question:   Release to patient     Answer:   Routine Release [[id number]]    Hemoglobin A1c     Standing Status:   Future     Standing Expiration Date:   10/14/2025     Order Specific Question:   Release to patient     Answer:   Routine Release [[id number]]    TSH Rfx On Abnormal To Free T4     Standing Status:   Future     Standing Expiration Date:   10/14/2025     Order Specific Question:   Release to patient     Answer:   Routine Release [[id number]]             Follow Up   Return in about 6 months (around 4/14/2025), or if symptoms worsen or fail to improve, for Recheck- syncope .  Patient was given instructions and counseling regarding her condition or for health maintenance advice. Please see specific information pulled into the AVS if appropriate.

## 2024-10-20 ENCOUNTER — PATIENT MESSAGE (OUTPATIENT)
Dept: NEUROLOGY | Facility: CLINIC | Age: 89
End: 2024-10-20
Payer: MEDICARE

## 2024-10-22 ENCOUNTER — APPOINTMENT (OUTPATIENT)
Dept: CT IMAGING | Facility: HOSPITAL | Age: 89
End: 2024-10-22
Payer: MEDICARE

## 2024-10-22 ENCOUNTER — HOSPITAL ENCOUNTER (OUTPATIENT)
Facility: HOSPITAL | Age: 89
Setting detail: OBSERVATION
Discharge: NURSING, REHAB OR LONG-TERM CARE | End: 2024-10-28
Attending: EMERGENCY MEDICINE | Admitting: STUDENT IN AN ORGANIZED HEALTH CARE EDUCATION/TRAINING PROGRAM
Payer: MEDICARE

## 2024-10-22 ENCOUNTER — LAB (OUTPATIENT)
Dept: LAB | Facility: HOSPITAL | Age: 89
End: 2024-10-22
Payer: MEDICARE

## 2024-10-22 DIAGNOSIS — Z79.01 ANTICOAGULATED: ICD-10-CM

## 2024-10-22 DIAGNOSIS — Z91.81 AT RISK FOR FALLS: ICD-10-CM

## 2024-10-22 DIAGNOSIS — R55 SYNCOPE, UNSPECIFIED SYNCOPE TYPE: ICD-10-CM

## 2024-10-22 DIAGNOSIS — R91.8 PULMONARY INFILTRATES: ICD-10-CM

## 2024-10-22 DIAGNOSIS — M54.2 NECK PAIN: ICD-10-CM

## 2024-10-22 DIAGNOSIS — R74.9 ABNORMAL SERUM ENZYME LEVEL, UNSPECIFIED: ICD-10-CM

## 2024-10-22 DIAGNOSIS — S09.90XA INJURY OF HEAD, INITIAL ENCOUNTER: Primary | ICD-10-CM

## 2024-10-22 DIAGNOSIS — S00.83XA FACIAL HEMATOMA, INITIAL ENCOUNTER: ICD-10-CM

## 2024-10-22 DIAGNOSIS — R93.89 ABNORMAL CHEST CT: ICD-10-CM

## 2024-10-22 DIAGNOSIS — Z13.220 SCREENING, LIPID: ICD-10-CM

## 2024-10-22 DIAGNOSIS — R73.01 ELEVATED FASTING BLOOD SUGAR: ICD-10-CM

## 2024-10-22 DIAGNOSIS — Z79.01 CHRONIC ANTICOAGULATION: ICD-10-CM

## 2024-10-22 DIAGNOSIS — I48.0 PAF (PAROXYSMAL ATRIAL FIBRILLATION): ICD-10-CM
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
## 2024-10-22 PROBLEM — H91.90 HARD OF HEARING: Status: ACTIVE | Noted: 2024-10-22

## 2024-10-22 PROBLEM — R53.1 GENERALIZED WEAKNESS: Status: ACTIVE | Noted: 2024-10-22

## 2024-10-22 LAB
ALBUMIN SERPL-MCNC: 4.1 G/DL (ref 3.5–5.2)
ALBUMIN/GLOB SERPL: 1.4 G/DL
ALP SERPL-CCNC: 68 U/L (ref 39–117)
ALT SERPL W P-5'-P-CCNC: 20 U/L (ref 1–33)
ANION GAP SERPL CALCULATED.3IONS-SCNC: 9.7 MMOL/L (ref 5–15)
AST SERPL-CCNC: 21 U/L (ref 1–32)
BACTERIA UR QL AUTO: ABNORMAL /HPF
BILIRUB SERPL-MCNC: 0.5 MG/DL (ref 0–1.2)
BILIRUB UR QL STRIP: NEGATIVE
BUN SERPL-MCNC: 25 MG/DL (ref 8–23)
BUN/CREAT SERPL: 34.7 (ref 7–25)
CALCIUM SPEC-SCNC: 9.7 MG/DL (ref 8.6–10.5)
CHLORIDE SERPL-SCNC: 97 MMOL/L (ref 98–107)
CHOLEST SERPL-MCNC: 186 MG/DL (ref 0–200)
CLARITY UR: CLEAR
CO2 SERPL-SCNC: 28.3 MMOL/L (ref 22–29)
COLOR UR: YELLOW
CREAT SERPL-MCNC: 0.72 MG/DL (ref 0.57–1)
DEPRECATED RDW RBC AUTO: 42.7 FL (ref 37–54)
EGFRCR SERPLBLD CKD-EPI 2021: 81.5 ML/MIN/1.73
ERYTHROCYTE [DISTWIDTH] IN BLOOD BY AUTOMATED COUNT: 12.3 % (ref 12.3–15.4)
GLOBULIN UR ELPH-MCNC: 3 GM/DL
GLUCOSE SERPL-MCNC: 110 MG/DL (ref 65–99)
GLUCOSE UR STRIP-MCNC: ABNORMAL MG/DL
HBA1C MFR BLD: 5.7 % (ref 4.8–5.6)
HCT VFR BLD AUTO: 42.4 % (ref 34–46.6)
HDLC SERPL-MCNC: 84 MG/DL (ref 40–60)
HGB BLD-MCNC: 13.8 G/DL (ref 12–15.9)
HGB UR QL STRIP.AUTO: ABNORMAL
HYALINE CASTS UR QL AUTO: ABNORMAL /LPF
KETONES UR QL STRIP: ABNORMAL
LDLC SERPL CALC-MCNC: 90 MG/DL (ref 0–100)
LDLC/HDLC SERPL: 1.06 {RATIO}
LEUKOCYTE ESTERASE UR QL STRIP.AUTO: ABNORMAL
MCH RBC QN AUTO: 30.7 PG (ref 26.6–33)
MCHC RBC AUTO-ENTMCNC: 32.5 G/DL (ref 31.5–35.7)
MCV RBC AUTO: 94.4 FL (ref 79–97)
NITRITE UR QL STRIP: NEGATIVE
PH UR STRIP.AUTO: 6 [PH] (ref 5–8)
PLATELET # BLD AUTO: 209 10*3/MM3 (ref 140–450)
PMV BLD AUTO: 10.4 FL (ref 6–12)
POTASSIUM SERPL-SCNC: 4.4 MMOL/L (ref 3.5–5.2)
PROT SERPL-MCNC: 7.1 G/DL (ref 6–8.5)
PROT UR QL STRIP: ABNORMAL
RBC # BLD AUTO: 4.49 10*6/MM3 (ref 3.77–5.28)
RBC # UR STRIP: ABNORMAL /HPF
REF LAB TEST METHOD: ABNORMAL
SODIUM SERPL-SCNC: 135 MMOL/L (ref 136–145)
SP GR UR STRIP: 1.02 (ref 1–1.03)
SQUAMOUS #/AREA URNS HPF: ABNORMAL /HPF
TRIGL SERPL-MCNC: 66 MG/DL (ref 0–150)
TSH SERPL DL<=0.05 MIU/L-ACNC: 1.38 UIU/ML (ref 0.27–4.2)
UROBILINOGEN UR QL STRIP: ABNORMAL
VLDLC SERPL-MCNC: 12 MG/DL (ref 5–40)
WBC # UR STRIP: ABNORMAL /HPF
WBC NRBC COR # BLD AUTO: 9.46 10*3/MM3 (ref 3.4–10.8)
YEAST URNS QL MICRO: ABNORMAL /HPF

## 2024-10-22 PROCEDURE — 85027 COMPLETE CBC AUTOMATED: CPT

## 2024-10-22 PROCEDURE — G0378 HOSPITAL OBSERVATION PER HR: HCPCS

## 2024-10-22 PROCEDURE — 84443 ASSAY THYROID STIM HORMONE: CPT

## 2024-10-22 PROCEDURE — 80053 COMPREHEN METABOLIC PANEL: CPT

## 2024-10-22 PROCEDURE — 99223 1ST HOSP IP/OBS HIGH 75: CPT

## 2024-10-22 PROCEDURE — 81001 URINALYSIS AUTO W/SCOPE: CPT | Performed by: EMERGENCY MEDICINE

## 2024-10-22 PROCEDURE — 99285 EMERGENCY DEPT VISIT HI MDM: CPT

## 2024-10-22 PROCEDURE — 70450 CT HEAD/BRAIN W/O DYE: CPT

## 2024-10-22 PROCEDURE — 63710000001 DIPHENHYDRAMINE PER 50 MG: Performed by: NURSE PRACTITIONER

## 2024-10-22 PROCEDURE — 72125 CT NECK SPINE W/O DYE: CPT

## 2024-10-22 PROCEDURE — 80061 LIPID PANEL: CPT

## 2024-10-22 PROCEDURE — 83036 HEMOGLOBIN GLYCOSYLATED A1C: CPT

## 2024-10-22 PROCEDURE — 71250 CT THORAX DX C-: CPT

## 2024-10-22 RX ORDER — DIPHENHYDRAMINE HCL 25 MG
25 CAPSULE ORAL ONCE
Status: COMPLETED | OUTPATIENT
Start: 2024-10-23 | End: 2024-10-22

## 2024-10-22 RX ORDER — LISINOPRIL 10 MG/1
10 TABLET ORAL DAILY
Status: DISCONTINUED | OUTPATIENT
Start: 2024-10-23 | End: 2024-10-28 | Stop reason: HOSPADM

## 2024-10-22 RX ORDER — ACETAMINOPHEN 500 MG
1000 TABLET ORAL 3 TIMES DAILY
Status: DISPENSED | OUTPATIENT
Start: 2024-10-22 | End: 2024-10-24

## 2024-10-22 RX ORDER — DONEPEZIL HYDROCHLORIDE 10 MG/1
10 TABLET, FILM COATED ORAL NIGHTLY
Status: DISCONTINUED | OUTPATIENT
Start: 2024-10-22 | End: 2024-10-28 | Stop reason: HOSPADM

## 2024-10-22 RX ORDER — DOXYCYCLINE 100 MG/1
100 CAPSULE ORAL 2 TIMES DAILY
Qty: 14 CAPSULE | Refills: 0 | Status: SHIPPED | OUTPATIENT
Start: 2024-10-22 | End: 2024-10-28 | Stop reason: HOSPADM

## 2024-10-22 RX ORDER — SODIUM CHLORIDE 0.9 % (FLUSH) 0.9 %
10 SYRINGE (ML) INJECTION EVERY 12 HOURS SCHEDULED
Status: DISCONTINUED | OUTPATIENT
Start: 2024-10-22 | End: 2024-10-28 | Stop reason: HOSPADM

## 2024-10-22 RX ORDER — POLYETHYLENE GLYCOL 3350 17 G/17G
17 POWDER, FOR SOLUTION ORAL DAILY PRN
Status: DISCONTINUED | OUTPATIENT
Start: 2024-10-22 | End: 2024-10-28 | Stop reason: HOSPADM

## 2024-10-22 RX ORDER — BISACODYL 10 MG
10 SUPPOSITORY, RECTAL RECTAL DAILY PRN
Status: DISCONTINUED | OUTPATIENT
Start: 2024-10-22 | End: 2024-10-28 | Stop reason: HOSPADM

## 2024-10-22 RX ORDER — SPIRONOLACTONE 25 MG/1
25 TABLET ORAL DAILY
Status: DISCONTINUED | OUTPATIENT
Start: 2024-10-23 | End: 2024-10-28 | Stop reason: HOSPADM

## 2024-10-22 RX ORDER — SODIUM CHLORIDE 0.9 % (FLUSH) 0.9 %
10 SYRINGE (ML) INJECTION AS NEEDED
Status: DISCONTINUED | OUTPATIENT
Start: 2024-10-22 | End: 2024-10-28 | Stop reason: HOSPADM

## 2024-10-22 RX ORDER — BISACODYL 5 MG/1
5 TABLET, DELAYED RELEASE ORAL DAILY PRN
Status: DISCONTINUED | OUTPATIENT
Start: 2024-10-22 | End: 2024-10-28 | Stop reason: HOSPADM

## 2024-10-22 RX ORDER — AMOXICILLIN 250 MG
2 CAPSULE ORAL 2 TIMES DAILY PRN
Status: DISCONTINUED | OUTPATIENT
Start: 2024-10-22 | End: 2024-10-28 | Stop reason: HOSPADM

## 2024-10-22 RX ORDER — AMIODARONE HYDROCHLORIDE 200 MG/1
200 TABLET ORAL DAILY
Status: DISCONTINUED | OUTPATIENT
Start: 2024-10-23 | End: 2024-10-28 | Stop reason: HOSPADM

## 2024-10-22 RX ADMIN — DIPHENHYDRAMINE HYDROCHLORIDE 25 MG: 25 CAPSULE ORAL at 23:49

## 2024-10-22 RX ADMIN — ACETAMINOPHEN 1000 MG: 500 TABLET ORAL at 21:44

## 2024-10-22 RX ADMIN — Medication 5 MG: at 21:44

## 2024-10-22 RX ADMIN — APIXABAN 2.5 MG: 2.5 TABLET, FILM COATED ORAL at 21:44

## 2024-10-22 RX ADMIN — DONEPEZIL HYDROCHLORIDE 10 MG: 10 TABLET, FILM COATED ORAL at 21:44

## 2024-10-22 NOTE — H&P
"    [facility] Medicine Services  HISTORY AND PHYSICAL    Patient Name: Billie Yu  : 1935  MRN: [id number]  Primary Care Physician: Elvi Ventura MD  Date of admission: 10/22/2024    Subjective   Subjective     Chief Complaint:  Fall    HPI:  Billie Yu is a 88 y.o. female with significant medical history for paroxysmal atrial fibrillation, chronic anticoagulation, syncope, presents to [facility] after a fall at home sustaining a head injury.    Patient dates she was returning home after spending the afternoon with her son.  She got out of the car and headed towards the front door, noticed that she left the garden hose out, and decided to pick that up before heading inside.  She states that she did not lose her balance due to leg weakness or dizziness, she describes more of a mechanical fall tripping on the hose with her slide on shoes.  She states she tried to brace her fall with her hands, but did hit her head on concrete.  She did not lose consciousness, reports she remembers the fall and it was very \"scary\".  She denies nausea and vomiting, or visual disturbances.  She denies injury to her body other than the lacerations and hematoma on her face and abrasions on her hand from bracing her fall.  She also states that she lives at home by herself and she was afraid to spend the night at home alone.  She denies headache, reports her pain is more localized to her forehead, denies fever, denies nausea, vomiting, and diarrhea.  Also denies chest pain or shortness of breath.        In ED    CT scan of head  1.Right forehead hematoma. No acute intracranial process is identified.   2.Cervical degenerative changes without acute injury identified.   3.Left apical pulmonary opacities potentially infectious. Nonemergent follow-up noncontrast chest CT recommended.       CT scan of chest    Areas of patchy opacities and nodular changes noted within the lungs bilaterally. " This is a nonspecific finding but most likely represents a chronic infectious/inflammatory process. Recommend follow-up chest CT in 3 months after resolution of symptoms to   exclude underlying pulmonary lesion/mass.     Age indeterminant mild to moderate compression deformities of the T8 and T9 vertebral bodies. These are most likely chronic. Please correlate with point tenderness.     Moderate hiatal/paraesophageal hernia. There is apparent wall thickening of the distal esophagus, which may represent esophagitis. If there is clinical concern for an underlying lesion, please consider follow-up with dedicated outpatient endoscopic   evaluation.             Review of Systems   Constitutional:  Negative for appetite change and fatigue.   HENT:  Positive for facial swelling (Large hematoma on right forehead).    Eyes: Negative.    Respiratory:  Negative for cough, chest tightness and shortness of breath.    Cardiovascular:  Negative for chest pain and leg swelling.   Gastrointestinal:  Negative for diarrhea, nausea and vomiting.   Endocrine: Negative.    Genitourinary:  Negative for difficulty urinating.   Musculoskeletal: Negative.    Skin:  Positive for wound (Large hematoma on right forehead, abrasion on bridge of her nose, bilateral hand abrasions).   Allergic/Immunologic: Negative.    Neurological:  Negative for dizziness, light-headedness and headaches.   Hematological: Negative.    Psychiatric/Behavioral: Negative.              Personal History     Past Medical History:   Diagnosis Date    Atrial fibrillation     Cataract     Dementia 2022    Difficulty walking 2022    HL (hearing loss)     Hypertension     Memory loss     Osteopenia     Scoliosis              Past Surgical History:   Procedure Laterality Date    BLADDER AUGMENTATION      BREAST SURGERY      HIP FRACTURE SURGERY Left 2022    HYSTERECTOMY      MASTECTOMY Right     WRIST ARTHROPLASTY Left        Family History:  family history includes Heart  disease in her brother and father; Hiatal hernia in her father; Hyperlipidemia in her brother and father; Hypertension in her brother and father; Stroke in her mother.     Social History:  reports that she has never smoked. She has never used smokeless tobacco. She reports current alcohol use of about 14.0 standard drinks of alcohol per week. She reports that she does not use drugs.  Social History     Social History Narrative    Not on file       Medications:  amiodarone, apixaban, donepezil, doxycycline, lisinopril, and spironolactone    No Known Allergies    Objective   Objective     Vital Signs:   Temp:  [98.4 °F (36.9 °C)] 98.4 °F (36.9 °C)  Heart Rate:  [71-73] 73  Resp:  [18] 18  BP: ()/(43-73) 150/61    Physical Exam  Constitutional:       Appearance: Normal appearance.   HENT:      Head: Normocephalic. Abrasion (Bridge of nose) and contusion (Right forehead) present.   Eyes:      Extraocular Movements: Extraocular movements intact.      Pupils: Pupils are equal, round, and reactive to light.   Cardiovascular:      Rate and Rhythm: Normal rate and regular rhythm.   Pulmonary:      Effort: Pulmonary effort is normal.      Breath sounds: Normal breath sounds. No wheezing.   Abdominal:      General: Bowel sounds are normal.      Palpations: Abdomen is soft.   Musculoskeletal:      Right lower leg: No edema.      Left lower leg: No edema.   Skin:     General: Skin is warm and dry.      Findings: Bruising present.   Neurological:      General: No focal deficit present.      Mental Status: She is alert and oriented to person, place, and time.   Psychiatric:         Mood and Affect: Mood normal.         Behavior: Behavior normal.         Result Review:  I have personally reviewed the results from the time of this admission to 10/22/2024 20:45 EDT and agree with these findings:  [x]  Laboratory list / accordion  []  Microbiology  [x]  Radiology  [x]  EKG/Telemetry   []  Cardiology/Vascular   []  Pathology  [x]   Old records  []  Other:      LAB RESULTS:      Lab 10/22/24  1020   WBC 9.46   HEMOGLOBIN 13.8   HEMATOCRIT 42.4   PLATELETS 209   MCV 94.4         Lab 10/22/24  1020   SODIUM 135*   POTASSIUM 4.4   CHLORIDE 97*   CO2 28.3   ANION GAP 9.7   BUN 25*   CREATININE 0.72   EGFR 81.5   GLUCOSE 110*   CALCIUM 9.7   HEMOGLOBIN A1C 5.70*   TSH 1.380         Lab 10/22/24  1020   TOTAL PROTEIN 7.1   ALBUMIN 4.1   GLOBULIN 3.0   ALT (SGPT) 20   AST (SGOT) 21   BILIRUBIN 0.5   ALK PHOS 68             Lab 10/22/24  1020   CHOLESTEROL 186   LDL CHOL 90   HDL CHOL 84*   TRIGLYCERIDES 66             Brief Urine Lab Results  (Last result in the past 365 days)        Color   Clarity   Blood   Leuk Est   Nitrite   Protein   CREAT   Urine HCG        10/22/24 2004 Yellow   Clear   Large (3+)   Trace   Negative   30 mg/dL (1+)                 Microbiology Results (last 10 days)       ** No results found for the last 240 hours. **            CT Chest Without Contrast Diagnostic    Result Date: 10/22/2024  CT CHEST WO CONTRAST DIAGNOSTIC Date of Exam: 10/22/2024 6:28 PM EDT Indication: Left apical abnormality evaluation seen on cervical CT. Comparison: Same day cervical spine CT Technique: Axial CT images were obtained of the chest without contrast administration.  Reconstructed coronal and sagittal images were also obtained. Automated exposure control and iterative construction methods were used. Findings: Lower Neck: Unremarkable. Hilum and Mediastinum: Moderate-sized hiatal/paraesophageal hernia. Possible mild wall thickening of the distal esophagus. Mild enlargement of the central pulmonary arteries, nonspecific but may represent pulmonary arterial hypertension. Vascular calcifications of the thoracic aorta. Otherwise the visualized vasculature are unremarkable. Normal size heart without pericardial effusion. No definite lymphadenopathy Lung Parenchyma and Pleura: No pleural effusion or pneumothorax. Multiple scattered focal patchy  opacities noted within the lungs bilaterally, predominantly within the upper lobes and left lower lobe.. Within these regions, there are also multiple areas of nodular opacity, the largest of which measures 1.2 cm in the left upper lobe. No consolidation. The central airways are patent. Upper Abdomen: No acute process. Soft tissues: Patient is status post right mastectomy. Otherwise unremarkable Osseous structures: No definite acute osseous abnormality. Severe S-shaped scoliosis. Mild to moderate compression deformities of the T8 and T9 vertebral bodies, most likely chronic.     Impression: Impression: Areas of patchy opacities and nodular changes noted within the lungs bilaterally. This is a nonspecific finding but most likely represents a chronic infectious/inflammatory process. Recommend follow-up chest CT in 3 months after resolution of symptoms to  exclude underlying pulmonary lesion/mass. Age indeterminant mild to moderate compression deformities of the T8 and T9 vertebral bodies. These are most likely chronic. Please correlate with point tenderness. Moderate hiatal/paraesophageal hernia. There is apparent wall thickening of the distal esophagus, which may represent esophagitis. If there is clinical concern for an underlying lesion, please consider follow-up with dedicated outpatient endoscopic evaluation. Electronically Signed: Bandar Monge DO  10/22/2024 6:53 PM EDT  Workstation ID: [id number]    CT Head Without Contrast    Result Date: 10/22/2024  CT HEAD WO CONTRAST, CT CERVICAL SPINE WO CONTRAST Date of Exam: 10/22/2024 5:47 PM EDT Indication: Head injury on blood thinners. Comparison: Head CT 6/19/2024 Technique: Axial CT images were obtained of the head and cervical spine without contrast administration.  Automated exposure control and iterative construction methods were used. Findings: CT HEAD: There is no evidence of acute infarction. There is no acute intracranial hemorrhage. There are no  extra-axial collections. Ventricles and CSF spaces are symmetrically prominent. No mass effect nor hydrocephalus. Brain parenchyma appears unchanged.  Paranasal sinuses and mastoid air cells are adequately aerated.  Osseous structures and orbits appear intact. There is a right forehead hematoma. CT CERVICAL SPINE: OSSEOUS STRUCTURES: No fracture nor bony destructive process. ALIGNMENT:  Normal. There is right convex curvature of the mid cervical spine. DISC SPACE: There is moderate intervertebral disc space narrowing and endplate osteophyte formation along the inner margin of the cervical curvature. JOINTS: Advanced facet arthropathy with more mild uncovertebral hypertrophy. There is multilevel facet fusion. SOFT TISSUES: There is a 2.3 cm lower pole right thyroid nodule. There is a 2 cm lower pole left thyroid nodule. No follow-up suggested given patient's age. There is moderate to advanced carotid atherosclerotic disease. LUNG APICES: There are few irregular nodular opacities in the left lung apex potentially infectious. However, neoplasm not excluded. Nonemergent follow-up noncontrast chest CT recommended. LEVELS: No significant osseous central canal nor foraminal stenosis identified at any level.     Impression: Impression: 1.Right forehead hematoma. No acute intracranial process is identified. 2.Cervical degenerative changes without acute injury identified. 3.Left apical pulmonary opacities potentially infectious. Nonemergent follow-up noncontrast chest CT recommended. Electronically Signed: Osei Cardona MD  10/22/2024 6:03 PM EDT  Workstation ID: [id number]    CT Cervical Spine Without Contrast    Result Date: 10/22/2024  CT HEAD WO CONTRAST, CT CERVICAL SPINE WO CONTRAST Date of Exam: 10/22/2024 5:47 PM EDT Indication: Head injury on blood thinners. Comparison: Head CT 6/19/2024 Technique: Axial CT images were obtained of the head and cervical spine without contrast administration.  Automated exposure control  and iterative construction methods were used. Findings: CT HEAD: There is no evidence of acute infarction. There is no acute intracranial hemorrhage. There are no extra-axial collections. Ventricles and CSF spaces are symmetrically prominent. No mass effect nor hydrocephalus. Brain parenchyma appears unchanged.  Paranasal sinuses and mastoid air cells are adequately aerated.  Osseous structures and orbits appear intact. There is a right forehead hematoma. CT CERVICAL SPINE: OSSEOUS STRUCTURES: No fracture nor bony destructive process. ALIGNMENT:  Normal. There is right convex curvature of the mid cervical spine. DISC SPACE: There is moderate intervertebral disc space narrowing and endplate osteophyte formation along the inner margin of the cervical curvature. JOINTS: Advanced facet arthropathy with more mild uncovertebral hypertrophy. There is multilevel facet fusion. SOFT TISSUES: There is a 2.3 cm lower pole right thyroid nodule. There is a 2 cm lower pole left thyroid nodule. No follow-up suggested given patient's age. There is moderate to advanced carotid atherosclerotic disease. LUNG APICES: There are few irregular nodular opacities in the left lung apex potentially infectious. However, neoplasm not excluded. Nonemergent follow-up noncontrast chest CT recommended. LEVELS: No significant osseous central canal nor foraminal stenosis identified at any level.     Impression: Impression: 1.Right forehead hematoma. No acute intracranial process is identified. 2.Cervical degenerative changes without acute injury identified. 3.Left apical pulmonary opacities potentially infectious. Nonemergent follow-up noncontrast chest CT recommended. Electronically Signed: Osei Cardona MD  10/22/2024 6:03 PM EDT  Workstation ID: [id number]     Results for orders placed during the hospital encounter of 06/19/24    Adult Transthoracic Echo Complete With Contrast if Necessary Per Protocol    Interpretation Summary    Left ventricular  ejection fraction appears to be in the lower limits of normal    Left atrial volume is moderately increased.    The right atrial cavity is borderline dilated.    Moderate aortic valve regurgitation is present.    Mild aortic valve stenosis is present. Aortic valve area is 1.2 cm2.    Peak velocity of the flow distal to the aortic valve is 277 cm/s. Aortic valve maximum pressure gradient is 31 mmHg. Aortic valve mean pressure gradient is 17 mmHg. Aortic valve dimensionless index is 0.39 .    Estimated right ventricular systolic pressure from tricuspid regurgitation is mildly elevated (35-45 mmHg).      Assessment & Plan   Assessment & Plan       Fall    Traumatic hematoma of forehead    PAF (paroxysmal atrial fibrillation)    Generalized weakness      Billie Yu is a 88 y.o. female with significant medical history for paroxysmal atrial fibrillation, chronic anticoagulation, syncope, presents to [facility] after a fall at home sustaining a head injury.    Fall  Head injury  Generalized weakness  -CT scan reveals right forehead hematoma. No acute intracranial process is identified.   -Neuro checks every 4 hours  -Fall risk  -PT/OT consult  -WOC consult  -Case management consult  -CMP and CBC in a.m.    HTN  Atrial fibrillation  -Continue anticoagulation (Eliquis)  -Continue amiodarone   -continue lisinopril  -Continue spironolactone  -Telemetry  -EKG in am    Dementia  -Continue donepezil    Hard of hearing  -Patient wears hearing aids, not present on admission      Hiatal/paraesophageal hernia  -CT scan of chest reveals apparent wall thickening of the distal esophagus, which may represent esophagitis.  If there is a clinical concern for underlying lesion, please consider follow-up with outpatient endoscopic evaluation.       Nodular changes bilateral lungs  -CT scan of chest reveals patchy opacities and nodular changes noted within the lungs bilaterally.  This is nonspecific finding but mostly  represents a chronic infectious/inflammatory process.  Recommend follow-up chest CT in 3 months to exclude underlying pulmonary lesion/mass.         DVT prophylaxis: Eliquis    CODE STATUS:  Full   Level Of Support Discussed With: Patient  Code Status (Patient has no pulse and is not breathing): CPR (Attempt to Resuscitate)  Medical Interventions (Patient has pulse or is breathing): Full Support      Expected Discharge    TBD      This note has been completed as part of a split-shared workflow.     Signature: Electronically signed by FAROOQ Maya, 10/22/24, 8:43 PM EDT

## 2024-10-22 NOTE — ED PROVIDER NOTES
EMERGENCY DEPARTMENT ENCOUNTER    Pt Name: Billie Yu  MRN: [id number]  Pt :   1935  Room Number:  33/33  Date of encounter:  10/22/2024  PCP: Elvi Ventura MD  ED Provider: Venkat Wade MD    Historian: son/HCPOA and patient      HPI:  Chief Complaint: Head injury        Context: Billie Yu is a 88 y.o. female who presents to the ED c/o fall with head injury.  The patient was out on her front walk and she stumbled and fell to the ground.  She struck her forehead and notes a hematoma with abrasions on her right forehead.  She denies nausea and vomiting.  She has had no loss of consciousness.  She also sustained wounds to her right wrist and left hand.  She denies other bony tenderness.  The patient is anticoagulated secondary to atrial fibrillation.  Patient has been diagnosed with dementia and saw her neurologist this past week.  The patient's son provides most of the history as the patient is a poor historian.        PAST MEDICAL HISTORY  Past Medical History:   Diagnosis Date    Atrial fibrillation     Cataract     Dementia     Difficulty walking     HL (hearing loss)     Hypertension     Memory loss     Osteopenia     Scoliosis          PAST SURGICAL HISTORY  Past Surgical History:   Procedure Laterality Date    BLADDER AUGMENTATION      BREAST SURGERY      HIP FRACTURE SURGERY Left     HYSTERECTOMY      MASTECTOMY Right     WRIST ARTHROPLASTY Left          FAMILY HISTORY  Family History   Problem Relation Age of Onset    Stroke Mother     Hyperlipidemia Father     Hypertension Father     Heart disease Father     Hiatal hernia Father     Heart disease Brother     Hyperlipidemia Brother     Hypertension Brother          SOCIAL HISTORY  Social History     Socioeconomic History    Marital status:    Tobacco Use    Smoking status: Never    Smokeless tobacco: Never   Vaping Use    Vaping status: Never Used   Substance and Sexual Activity    Alcohol use: Yes     Alcohol/week:  14.0 standard drinks of alcohol     Types: 10 Glasses of wine, 4 Drinks containing 0.5 oz of alcohol per week    Drug use: Never    Sexual activity: Not Currently     Partners: Male     Birth control/protection: Vasectomy         ALLERGIES  Patient has no known allergies.        REVIEW OF SYSTEMS  Review of Systems       All systems reviewed and negative except for those discussed in HPI.       PHYSICAL EXAM    I have reviewed the triage vital signs and nursing notes.    ED Triage Vitals   Temp Heart Rate Resp BP SpO2   10/22/24 1705 10/22/24 1702 10/22/24 1705 10/22/24 1705 10/22/24 1702   98.4 °F (36.9 °C) 71 18 114/43 93 %      Temp src Heart Rate Source Patient Position BP Location FiO2 (%)   10/22/24 1705 -- -- -- --   Oral           Physical Exam  GENERAL:   Appears nontoxic but a little uncomfortable  HENT: Nares patent.  Right forehead hematoma with abrasion overlying  EYES: No scleral icterus.  CV: Regular rhythm, regular rate.  No murmurs gallops rubs  RESPIRATORY: Normal effort.  No audible wheezes, rales or rhonchi.  Clear to auscultation  ABDOMEN: Soft, nontender  MUSCULOSKELETAL: No deformities.  Mild cervical tenderness to palpation both medially as well as laterally.  A detailed head to toe exam was performed no other abnormalities were noted.  NEURO: Alert, moves all extremities, follows commands.  SKIN: Warm, dry, no rash visualized.      LAB RESULTS  Recent Results (from the past 24 hours)   CBC (No Diff)    Collection Time: 10/22/24 10:20 AM    Specimen: Blood   Result Value Ref Range    WBC 9.46 3.40 - 10.80 10*3/mm3    RBC 4.49 3.77 - 5.28 10*6/mm3    Hemoglobin 13.8 12.0 - 15.9 g/dL    Hematocrit 42.4 34.0 - 46.6 %    MCV 94.4 79.0 - 97.0 fL    MCH 30.7 26.6 - 33.0 pg    MCHC 32.5 31.5 - 35.7 g/dL    RDW 12.3 12.3 - 15.4 %    RDW-SD 42.7 37.0 - 54.0 fl    MPV 10.4 6.0 - 12.0 fL    Platelets 209 140 - 450 10*3/mm3   Comprehensive Metabolic Panel    Collection Time: 10/22/24 10:20 AM     Specimen: Blood   Result Value Ref Range    Glucose 110 (H) 65 - 99 mg/dL    BUN 25 (H) 8 - 23 mg/dL    Creatinine 0.72 0.57 - 1.00 mg/dL    Sodium 135 (L) 136 - 145 mmol/L    Potassium 4.4 3.5 - 5.2 mmol/L    Chloride 97 (L) 98 - 107 mmol/L    CO2 28.3 22.0 - 29.0 mmol/L    Calcium 9.7 8.6 - 10.5 mg/dL    Total Protein 7.1 6.0 - 8.5 g/dL    Albumin 4.1 3.5 - 5.2 g/dL    ALT (SGPT) 20 1 - 33 U/L    AST (SGOT) 21 1 - 32 U/L    Alkaline Phosphatase 68 39 - 117 U/L    Total Bilirubin 0.5 0.0 - 1.2 mg/dL    Globulin 3.0 gm/dL    A/G Ratio 1.4 g/dL    BUN/Creatinine Ratio 34.7 (H) 7.0 - 25.0    Anion Gap 9.7 5.0 - 15.0 mmol/L    eGFR 81.5 >60.0 mL/min/1.73   Lipid Panel    Collection Time: 10/22/24 10:20 AM    Specimen: Blood   Result Value Ref Range    Total Cholesterol 186 0 - 200 mg/dL    Triglycerides 66 0 - 150 mg/dL    HDL Cholesterol 84 (H) 40 - 60 mg/dL    LDL Cholesterol  90 0 - 100 mg/dL    VLDL Cholesterol 12 5 - 40 mg/dL    LDL/HDL Ratio 1.06    Hemoglobin A1c    Collection Time: 10/22/24 10:20 AM    Specimen: Blood   Result Value Ref Range    Hemoglobin A1C 5.70 (H) 4.80 - 5.60 %   TSH Rfx On Abnormal To Free T4    Collection Time: 10/22/24 10:20 AM    Specimen: Blood   Result Value Ref Range    TSH 1.380 0.270 - 4.200 uIU/mL       If labs were ordered, I independently reviewed the results and considered them in treating the patient.        RADIOLOGY  CT Chest Without Contrast Diagnostic    Result Date: 10/22/2024  CT CHEST WO CONTRAST DIAGNOSTIC Date of Exam: 10/22/2024 6:28 PM EDT Indication: Left apical abnormality evaluation seen on cervical CT. Comparison: Same day cervical spine CT Technique: Axial CT images were obtained of the chest without contrast administration.  Reconstructed coronal and sagittal images were also obtained. Automated exposure control and iterative construction methods were used. Findings: Lower Neck: Unremarkable. Hilum and Mediastinum: Moderate-sized hiatal/paraesophageal hernia.  Possible mild wall thickening of the distal esophagus. Mild enlargement of the central pulmonary arteries, nonspecific but may represent pulmonary arterial hypertension. Vascular calcifications of the thoracic aorta. Otherwise the visualized vasculature are unremarkable. Normal size heart without pericardial effusion. No definite lymphadenopathy Lung Parenchyma and Pleura: No pleural effusion or pneumothorax. Multiple scattered focal patchy opacities noted within the lungs bilaterally, predominantly within the upper lobes and left lower lobe.. Within these regions, there are also multiple areas of nodular opacity, the largest of which measures 1.2 cm in the left upper lobe. No consolidation. The central airways are patent. Upper Abdomen: No acute process. Soft tissues: Patient is status post right mastectomy. Otherwise unremarkable Osseous structures: No definite acute osseous abnormality. Severe S-shaped scoliosis. Mild to moderate compression deformities of the T8 and T9 vertebral bodies, most likely chronic.     Impression: Areas of patchy opacities and nodular changes noted within the lungs bilaterally. This is a nonspecific finding but most likely represents a chronic infectious/inflammatory process. Recommend follow-up chest CT in 3 months after resolution of symptoms to  exclude underlying pulmonary lesion/mass. Age indeterminant mild to moderate compression deformities of the T8 and T9 vertebral bodies. These are most likely chronic. Please correlate with point tenderness. Moderate hiatal/paraesophageal hernia. There is apparent wall thickening of the distal esophagus, which may represent esophagitis. If there is clinical concern for an underlying lesion, please consider follow-up with dedicated outpatient endoscopic evaluation. Electronically Signed: Bandar Monge DO  10/22/2024 6:53 PM EDT  Workstation ID: [id number]    CT Head Without Contrast    Result Date: 10/22/2024  CT HEAD WO CONTRAST, CT  CERVICAL SPINE WO CONTRAST Date of Exam: 10/22/2024 5:47 PM EDT Indication: Head injury on blood thinners. Comparison: Head CT 6/19/2024 Technique: Axial CT images were obtained of the head and cervical spine without contrast administration.  Automated exposure control and iterative construction methods were used. Findings: CT HEAD: There is no evidence of acute infarction. There is no acute intracranial hemorrhage. There are no extra-axial collections. Ventricles and CSF spaces are symmetrically prominent. No mass effect nor hydrocephalus. Brain parenchyma appears unchanged.  Paranasal sinuses and mastoid air cells are adequately aerated.  Osseous structures and orbits appear intact. There is a right forehead hematoma. CT CERVICAL SPINE: OSSEOUS STRUCTURES: No fracture nor bony destructive process. ALIGNMENT:  Normal. There is right convex curvature of the mid cervical spine. DISC SPACE: There is moderate intervertebral disc space narrowing and endplate osteophyte formation along the inner margin of the cervical curvature. JOINTS: Advanced facet arthropathy with more mild uncovertebral hypertrophy. There is multilevel facet fusion. SOFT TISSUES: There is a 2.3 cm lower pole right thyroid nodule. There is a 2 cm lower pole left thyroid nodule. No follow-up suggested given patient's age. There is moderate to advanced carotid atherosclerotic disease. LUNG APICES: There are few irregular nodular opacities in the left lung apex potentially infectious. However, neoplasm not excluded. Nonemergent follow-up noncontrast chest CT recommended. LEVELS: No significant osseous central canal nor foraminal stenosis identified at any level.     Impression: 1.Right forehead hematoma. No acute intracranial process is identified. 2.Cervical degenerative changes without acute injury identified. 3.Left apical pulmonary opacities potentially infectious. Nonemergent follow-up noncontrast chest CT recommended. Electronically Signed: Osei  MD Dulce  10/22/2024 6:03 PM EDT  Workstation ID: [id number]    CT Cervical Spine Without Contrast    Result Date: 10/22/2024  CT HEAD WO CONTRAST, CT CERVICAL SPINE WO CONTRAST Date of Exam: 10/22/2024 5:47 PM EDT Indication: Head injury on blood thinners. Comparison: Head CT 6/19/2024 Technique: Axial CT images were obtained of the head and cervical spine without contrast administration.  Automated exposure control and iterative construction methods were used. Findings: CT HEAD: There is no evidence of acute infarction. There is no acute intracranial hemorrhage. There are no extra-axial collections. Ventricles and CSF spaces are symmetrically prominent. No mass effect nor hydrocephalus. Brain parenchyma appears unchanged.  Paranasal sinuses and mastoid air cells are adequately aerated.  Osseous structures and orbits appear intact. There is a right forehead hematoma. CT CERVICAL SPINE: OSSEOUS STRUCTURES: No fracture nor bony destructive process. ALIGNMENT:  Normal. There is right convex curvature of the mid cervical spine. DISC SPACE: There is moderate intervertebral disc space narrowing and endplate osteophyte formation along the inner margin of the cervical curvature. JOINTS: Advanced facet arthropathy with more mild uncovertebral hypertrophy. There is multilevel facet fusion. SOFT TISSUES: There is a 2.3 cm lower pole right thyroid nodule. There is a 2 cm lower pole left thyroid nodule. No follow-up suggested given patient's age. There is moderate to advanced carotid atherosclerotic disease. LUNG APICES: There are few irregular nodular opacities in the left lung apex potentially infectious. However, neoplasm not excluded. Nonemergent follow-up noncontrast chest CT recommended. LEVELS: No significant osseous central canal nor foraminal stenosis identified at any level.     Impression: 1.Right forehead hematoma. No acute intracranial process is identified. 2.Cervical degenerative changes without acute injury  identified. 3.Left apical pulmonary opacities potentially infectious. Nonemergent follow-up noncontrast chest CT recommended. Electronically Signed: Osei Cardona MD  10/22/2024 6:03 PM EDT  Workstation ID: [id number]     I ordered and independently reviewed the above noted radiographic studies.      I viewed images of CT head and cervical spine which showed no fractures per my independent interpretation.    See radiologist's dictation for official interpretation.        PROCEDURES    Procedures    No orders to display       MEDICATIONS GIVEN IN ER    Medications   Tetanus-Diphth-Acell Pertussis (BOOSTRIX) injection 0.5 mL (has no administration in time range)         MEDICAL DECISION MAKING, PROGRESS, and CONSULTS    All labs, if obtained, have been independently reviewed by me.  All radiology studies, if obtained, have been reviewed by me and the radiologist dictating the report.  All EKG's, if obtained, have been independently viewed and interpreted by me/my attending physician.      Discussion below represents my analysis of pertinent findings related to patient's condition, differential diagnosis, treatment plan and final disposition.                               Differential diagnosis:    Fall with head injury.  Consider intracranial hemorrhage, fractures, etc.      Additional sources:    - Discussed/ obtained information from independent historians: Patient's son/healthcare power of  is present and provides much of the history.    - External (non-ED) record review: Reviewed records to include discharge summary dated 6/22/2024 when the patient was admitted for syncope.    - Chronic or social conditions impacting care: Narcissistic personality disorder.  PTSD.  Dementia.        Orders placed during this visit:  Orders Placed This Encounter   Procedures    CT Head Without Contrast    CT Cervical Spine Without Contrast    CT Chest Without Contrast Diagnostic    Urinalysis With Microscopic If Indicated (No  Culture) - Urine, Clean Catch    Apply ice to affected area    Clean and dress all wounds.  Move arm band to side without injury  Misc Nursing Order (Specify)         Additional orders considered but not ordered:  Lab work.  She had labs drawn earlier today and I have reviewed those.  They are grossly within normal limits.    ED Course:    Consultants:      ED Course as of 10/1935   Tue Oct 22, 2024   1808 I reviewed the CT cervical and head.  I see no intracranial hemorrhage.  I do note left apical pulmonary opacities of unclear underlying etiology.  Patient has not been coughing.  CT scan has been recommended by radiology and I have ordered that study. [MS]   1809 Nursing staff administer Tdap, cleanse and bandage all wounds. [MS]   1812 I reviewed multiple labs which were drawn this morning and most within normal limits. [MS]   1900 Because of the CT chest lung findings I will place the patient on antibiotics and recommend that she  follow-up for 3 month repeat CT. [MS]   1936 I sent in a prescription for doxycycline and spoke with the patient and her son about discharge.  Both of them feel very uncomfortable and the patient's healthcare power of  is the son.  He feels that she is at too much of a fall risk.  I spoke with her about this and she agrees.  No one can stay with the patient tonight.  I have contacted Dr. Mendoza, hospitalist for request of admission. [MS]      ED Course User Index  [MS] Venkat Wade MD              Shared Decision Making:  After my consideration of clinical presentation and any laboratory/radiology studies obtained, I discussed the findings with the patient/patient representative who is in agreement with the treatment plan and the final disposition.   Risks and benefits of discharge and/or observation/admission were discussed.       AS OF 19:37 EDT VITALS:    BP - 150/61  HR - 73  TEMP - 98.4 °F (36.9 °C) (Oral)  O2 SATS - 93%                  DIAGNOSIS  Final  diagnoses:   Injury of head, initial encounter   Facial hematoma, initial encounter   Anticoagulated   Neck pain   Pulmonary infiltrates   Abnormal chest CT   At risk for falls         DISPOSITION  Admission      Please note that portions of this document were completed with voice recognition software.        Venkat Wade MD  10/24/24 1454

## 2024-10-23 PROBLEM — R73.03 PREDIABETES: Status: ACTIVE | Noted: 2024-10-23

## 2024-10-23 LAB
ALBUMIN SERPL-MCNC: 3.7 G/DL (ref 3.5–5.2)
ALBUMIN/GLOB SERPL: 1.2 G/DL
ALP SERPL-CCNC: 69 U/L (ref 39–117)
ALT SERPL W P-5'-P-CCNC: 16 U/L (ref 1–33)
ANION GAP SERPL CALCULATED.3IONS-SCNC: 9 MMOL/L (ref 5–15)
AST SERPL-CCNC: 20 U/L (ref 1–32)
BASOPHILS # BLD AUTO: 0.02 10*3/MM3 (ref 0–0.2)
BASOPHILS NFR BLD AUTO: 0.3 % (ref 0–1.5)
BILIRUB SERPL-MCNC: 0.5 MG/DL (ref 0–1.2)
BUN SERPL-MCNC: 23 MG/DL (ref 8–23)
BUN/CREAT SERPL: 41.8 (ref 7–25)
CALCIUM SPEC-SCNC: 9.1 MG/DL (ref 8.6–10.5)
CHLORIDE SERPL-SCNC: 100 MMOL/L (ref 98–107)
CO2 SERPL-SCNC: 25 MMOL/L (ref 22–29)
CREAT SERPL-MCNC: 0.55 MG/DL (ref 0.57–1)
DEPRECATED RDW RBC AUTO: 45.3 FL (ref 37–54)
EGFRCR SERPLBLD CKD-EPI 2021: 89.4 ML/MIN/1.73
EOSINOPHIL # BLD AUTO: 0.04 10*3/MM3 (ref 0–0.4)
EOSINOPHIL NFR BLD AUTO: 0.5 % (ref 0.3–6.2)
ERYTHROCYTE [DISTWIDTH] IN BLOOD BY AUTOMATED COUNT: 13.2 % (ref 12.3–15.4)
GLOBULIN UR ELPH-MCNC: 3 GM/DL
GLUCOSE SERPL-MCNC: 113 MG/DL (ref 65–99)
HCT VFR BLD AUTO: 40.2 % (ref 34–46.6)
HGB BLD-MCNC: 13.4 G/DL (ref 12–15.9)
IMM GRANULOCYTES # BLD AUTO: 0.02 10*3/MM3 (ref 0–0.05)
IMM GRANULOCYTES NFR BLD AUTO: 0.3 % (ref 0–0.5)
LYMPHOCYTES # BLD AUTO: 0.57 10*3/MM3 (ref 0.7–3.1)
LYMPHOCYTES NFR BLD AUTO: 7.2 % (ref 19.6–45.3)
MCH RBC QN AUTO: 31.5 PG (ref 26.6–33)
MCHC RBC AUTO-ENTMCNC: 33.3 G/DL (ref 31.5–35.7)
MCV RBC AUTO: 94.4 FL (ref 79–97)
MONOCYTES # BLD AUTO: 0.73 10*3/MM3 (ref 0.1–0.9)
MONOCYTES NFR BLD AUTO: 9.2 % (ref 5–12)
NEUTROPHILS NFR BLD AUTO: 6.59 10*3/MM3 (ref 1.7–7)
NEUTROPHILS NFR BLD AUTO: 82.5 % (ref 42.7–76)
NRBC BLD AUTO-RTO: 0 /100 WBC (ref 0–0.2)
PLATELET # BLD AUTO: 209 10*3/MM3 (ref 140–450)
PMV BLD AUTO: 10.2 FL (ref 6–12)
POTASSIUM SERPL-SCNC: 4.3 MMOL/L (ref 3.5–5.2)
PROT SERPL-MCNC: 6.7 G/DL (ref 6–8.5)
QT INTERVAL: 444 MS
QTC INTERVAL: 486 MS
RBC # BLD AUTO: 4.26 10*6/MM3 (ref 3.77–5.28)
SODIUM SERPL-SCNC: 134 MMOL/L (ref 136–145)
WBC NRBC COR # BLD AUTO: 7.97 10*3/MM3 (ref 3.4–10.8)

## 2024-10-23 PROCEDURE — G0378 HOSPITAL OBSERVATION PER HR: HCPCS

## 2024-10-23 PROCEDURE — 85025 COMPLETE CBC W/AUTO DIFF WBC: CPT

## 2024-10-23 PROCEDURE — 99232 SBSQ HOSP IP/OBS MODERATE 35: CPT | Performed by: STUDENT IN AN ORGANIZED HEALTH CARE EDUCATION/TRAINING PROGRAM

## 2024-10-23 PROCEDURE — 97116 GAIT TRAINING THERAPY: CPT

## 2024-10-23 PROCEDURE — 93005 ELECTROCARDIOGRAM TRACING: CPT

## 2024-10-23 PROCEDURE — 97162 PT EVAL MOD COMPLEX 30 MIN: CPT

## 2024-10-23 PROCEDURE — 93010 ELECTROCARDIOGRAM REPORT: CPT | Performed by: INTERNAL MEDICINE

## 2024-10-23 PROCEDURE — 80053 COMPREHEN METABOLIC PANEL: CPT

## 2024-10-23 RX ORDER — ACETAMINOPHEN 325 MG/1
650 TABLET ORAL EVERY 6 HOURS PRN
Status: DISCONTINUED | OUTPATIENT
Start: 2024-10-23 | End: 2024-10-28 | Stop reason: HOSPADM

## 2024-10-23 RX ADMIN — LISINOPRIL 10 MG: 10 TABLET ORAL at 09:34

## 2024-10-23 RX ADMIN — AMIODARONE HYDROCHLORIDE 200 MG: 200 TABLET ORAL at 09:34

## 2024-10-23 RX ADMIN — ACETAMINOPHEN 1000 MG: 500 TABLET ORAL at 09:33

## 2024-10-23 RX ADMIN — SENNOSIDES AND DOCUSATE SODIUM 2 TABLET: 50; 8.6 TABLET ORAL at 20:55

## 2024-10-23 RX ADMIN — APIXABAN 2.5 MG: 2.5 TABLET, FILM COATED ORAL at 20:55

## 2024-10-23 RX ADMIN — DONEPEZIL HYDROCHLORIDE 10 MG: 10 TABLET, FILM COATED ORAL at 20:55

## 2024-10-23 RX ADMIN — Medication 10 ML: at 20:55

## 2024-10-23 RX ADMIN — SPIRONOLACTONE 25 MG: 25 TABLET ORAL at 09:34

## 2024-10-23 RX ADMIN — ACETAMINOPHEN 1000 MG: 500 TABLET ORAL at 16:22

## 2024-10-23 RX ADMIN — APIXABAN 2.5 MG: 2.5 TABLET, FILM COATED ORAL at 09:34

## 2024-10-23 RX ADMIN — ACETAMINOPHEN 1000 MG: 500 TABLET ORAL at 20:55

## 2024-10-23 NOTE — PLAN OF CARE
Goal Outcome Evaluation:  Plan of Care Reviewed With: patient           Outcome Evaluation: PT initial evaluation complete. Pt presents with impaired cognition, impaired walking balance, and decreased activity tolerance. Pt ambulated 40t w/ CGAx1 and FWW requiring VC for upright posture, forward gaze, and body position in walker. In addition pt required frequent redirection throughout session. Pt lives alone and is typically independently w/ all mobility. Pt would benefit from IRF at d/c in addition to continued skilled therapy while hospitalized to maximize functional independence and decrease risk of falls.    Anticipated Discharge Disposition (PT): inpatient rehabilitation facility

## 2024-10-23 NOTE — ED NOTES
Billie Yu    Nursing Report ED to Floor:  Mental status: A&Ox4  Ambulatory status: x1 assist  Oxygen Therapy:  ORA  Cardiac Rhythm: NSR  Admitted from: home  Safety Concerns:  fall risk  Social Issues: none- son to be called with updates  ED Room #:  8280    ED Nurse Phone Extension - [phone] or may call [phone].      HPI:   Chief Complaint   Patient presents with    Fall       Past Medical History:  Past Medical History:   Diagnosis Date    Atrial fibrillation     Cataract     Dementia 2022    Difficulty walking 2022    HL (hearing loss)     Hypertension     Memory loss     Osteopenia     Scoliosis         Past Surgical History:  Past Surgical History:   Procedure Laterality Date    BLADDER AUGMENTATION      BREAST SURGERY      HIP FRACTURE SURGERY Left 2022    HYSTERECTOMY      MASTECTOMY Right     WRIST ARTHROPLASTY Left         Admitting Doctor:   Corinna Mendoza MD    Consulting Provider(s):  Consults       No orders found from 9/23/2024 to 10/23/2024.             Admitting Diagnosis:   The primary encounter diagnosis was Injury of head, initial encounter. Diagnoses of Facial hematoma, initial encounter, Anticoagulated, Neck pain, Pulmonary infiltrates, Abnormal chest CT, and At risk for falls were also pertinent to this visit.    Most Recent Vitals:   Vitals:    10/22/24 1705 10/22/24 1730 10/22/24 1900 10/22/24 1930   BP: 114/43 99/73 146/67 150/61   BP Location:  Right arm     Patient Position:  Lying     Pulse:  71 71 73   Resp: 18 18     Temp: 98.4 °F (36.9 °C)      TempSrc: Oral      SpO2:  93% 92% 93%   Weight:       Height:           Active LDAs/IV Access:   Lines, Drains & Airways       Active LDAs       Name Placement date Placement time Site Days    Peripheral IV 10/22/24 1629 Anterior;Left Forearm 10/22/24  1629  Forearm  less than 1                    Labs (abnormal labs have a star):   Labs Reviewed   URINALYSIS W/ MICROSCOPIC IF INDICATED (NO CULTURE)       Meds Given in ED:   Medications    Tetanus-Diphth-Acell Pertussis (BOOSTRIX) injection 0.5 mL (has no administration in time range)           Last NIH score:                                                          Dysphagia screening results:  Patient Factors Component (Dysphagia:Stroke or Rule-out)  Best Eye Response: 4-->(E4) spontaneous (10/22/24 1707)  Best Motor Response: 6-->(M6) obeys commands (10/22/24 1707)  Best Verbal Response: 5-->(V5) oriented (10/22/24 1707)  [place] Coma Scale Score: 15 (10/22/24 1707)     [place] Coma Scale:  No data recorded     CIWA:        Restraint Type:            Isolation Status:  No active isolations

## 2024-10-23 NOTE — PROGRESS NOTES
"Nutrition Services    Patient Name:  Billie Yu  YOB: 1935  MRN: [id number]  Admit Date:  10/22/2024    Patient screened for \"unsure\" weight loss. Chart reviewed. No significant weight changes documented/reported. Spoke with assigned RN who reported patient said she didn't quite enjoy her breakfast, but did eat most of it. No nutrition risks identified at this time. RDN following peripherally. Available via consult.     Electronically signed by:  Sonja Pike MS,RD,LD  10/23/24 10:41 EDT  "

## 2024-10-23 NOTE — NURSING NOTE
WOC consult for Fall at home.  Please assist with evaluating wounds and wound care     Assessed wounds with bedside nurse. Skin tears noted to bilateral hands and R wrist. Required NS to remove dressings. Re-dressed with multilayer xeroform, 4/4, kerlix and stockinette. Discussed how to enter nurse driven protocol for skin tears, nurse kindly placed orders. Removed vaseline guaze from nose and forehead, keeping open to air.

## 2024-10-23 NOTE — THERAPY EVALUATION
Patient Name: Billie Yu  : 1935    MRN: [id number]                              Today's Date: 10/23/2024       Admit Date: 10/22/2024    Visit Dx:     ICD-10-CM ICD-9-CM   1. Injury of head, initial encounter  S09.90XA 959.01   2. Facial hematoma, initial encounter  S00.83XA 920   3. Anticoagulated  Z79.01 V58.61   4. Neck pain  M54.2 723.1   5. Pulmonary infiltrates  R91.8 793.19   6. Abnormal chest CT  R93.89 793.2   7. At risk for falls  Z91.81 V15.88     Patient Active Problem List   Diagnosis    Syncope    PAF (paroxysmal atrial fibrillation)    Chronic anticoagulation    Narcissistic personality disorder    PTSD (post-traumatic stress disorder)    Memory impairment    Snoring    Hx of breast cancer    Other constipation    Fall    Traumatic hematoma of forehead    Generalized weakness    Hard of hearing    Prediabetes     Past Medical History:   Diagnosis Date    Atrial fibrillation     Cataract     Dementia     Difficulty walking     HL (hearing loss)     Hypertension     Memory loss     Osteopenia     Scoliosis      Past Surgical History:   Procedure Laterality Date    BLADDER AUGMENTATION      BREAST SURGERY      HIP FRACTURE SURGERY Left     HYSTERECTOMY      MASTECTOMY Right     WRIST ARTHROPLASTY Left       General Information       Row Name 10/23/24 [facility]          Physical Therapy Time and Intention    Document Type evaluation  -AC     Mode of Treatment physical therapy  -AC       Row Name 10/23/24 [facility]          General Information    Patient Profile Reviewed yes  -AC     Prior Level of Function independent:;all household mobility;community mobility;gait;transfer;bed mobility;ADL's  pt reports occasionally using FWW at home  -     Existing Precautions/Restrictions fall  -AC     Barriers to Rehab medically complex;previous functional deficit;cognitive status  -       Row Name 10/23/24 1432          Living Environment    People in Home alone  -       Row Name 10/23/24 1432           Home Main Entrance    Number of Stairs, Main Entrance three  -AC     Stair Railings, Main Entrance none  -AC       Row Name 10/23/24 1432          Stairs Within Home, Primary    Number of Stairs, Within Home, Primary none  -AC       Row Name 10/23/24 1432          Cognition    Orientation Status (Cognition) oriented x 3  -AC       Row Name 10/23/24 1432          Safety Issues/Impairments Affecting Functional Mobility    Safety Issues Affecting Function (Mobility) insight into deficits/self-awareness;safety precautions follow-through/compliance;awareness of need for assistance;safety precaution awareness;sequencing abilities;judgment  -     Impairments Affecting Function (Mobility) balance;cognition;endurance/activity tolerance;strength  -     Cognitive Impairments, Mobility Safety/Performance awareness, need for assistance;safety precaution awareness;insight into deficits/self-awareness;safety precaution follow-through;sequencing abilities;judgment;problem-solving/reasoning  -               User Key  (r) = Recorded By, (t) = Taken By, (c) = Cosigned By      Initials Name Provider Type    AC Shaylee Gonzalez, PT Physical Therapist                   Mobility       Row Name 10/23/24 1433          Bed Mobility    Comment, (Bed Mobility) Pt [facility] upon arrival  -       Row Name 10/23/24 1433          Sit-Stand Transfer    Sit-Stand [place] (Transfers) contact guard  -     Assistive Device (Sit-Stand Transfers) walker, front-wheeled  -AC     Comment, (Sit-Stand Transfer) Pt required CGA and FWW in order to stand from bedside chair  -       Row Name 10/23/24 1433          Gait/Stairs (Locomotion)    [place] Level (Gait) contact guard  -     Assistive Device (Gait) walker, front-wheeled  -     Distance in Feet (Gait) 40  -AC     Deviations/Abnormal Patterns (Gait) chato decreased;festinating/shuffling;gait speed decreased;stride length decreased  -AC     Bilateral Gait Deviations forward flexed  posture;heel strike decreased  -     Comment, (Gait/Stairs) Pt ambulated OOR w/ significant flexed trunk posture and decreased gait speed. In addition pt required frequent cues for body position in FWW and redirection throughout session due to impaired cognition.  -               User Key  (r) = Recorded By, (t) = Taken By, (c) = Cosigned By      Initials Name Provider Type     Shaylee Gonzalez PT Physical Therapist                   Obj/Interventions       Row Name 10/23/24 1435          Range of Motion Comprehensive    General Range of Motion bilateral lower extremity ROM WNL  -       Row Name 10/23/24 1435          Strength Comprehensive (MMT)    General Manual Muscle Testing (MMT) Assessment lower extremity strength deficits identified  -     Comment, General Manual Muscle Testing (MMT) Assessment BLE's grossly WFL  -       Row Name 10/23/24 1435          Motor Skills    Motor Skills coordination  -     Coordination WFL;lower extremity;heel to wick  -       Row Name 10/23/24 1435          Balance    Balance Assessment sitting static balance;sitting dynamic balance;standing static balance;standing dynamic balance  -     Static Sitting Balance standby assist  -     Dynamic Sitting Balance contact guard  -AC     Position, Sitting Balance sitting in chair;unsupported  -AC     Static Standing Balance contact guard  -AC     Dynamic Standing Balance contact guard  -AC     Position/Device Used, Standing Balance supported;walker, front-wheeled  -     Balance Interventions sitting;standing;sit to stand;supported;static;dynamic  -       Row Name 10/23/24 1435          Sensory Assessment (Somatosensory)    Sensory Assessment (Somatosensory) LE sensation intact  -               User Key  (r) = Recorded By, (t) = Taken By, (c) = Cosigned By      Initials Name Provider Type     Shaylee Gonzalez PT Physical Therapist                   Goals/Plan       Row Name 10/23/24 1442          Bed Mobility Goal 1 (PT)     Activity/Assistive Device (Bed Mobility Goal 1, PT) sit to supine/supine to sit  -AC     [place] Level/Cues Needed (Bed Mobility Goal 1, PT) modified independence  -AC     Time Frame (Bed Mobility Goal 1, PT) short term goal (STG);5 days  -AC       Row Name 10/23/24 1442          Transfer Goal 1 (PT)    Activity/Assistive Device (Transfer Goal 1, PT) sit-to-stand/stand-to-sit  -AC     [place] Level/Cues Needed (Transfer Goal 1, PT) standby assist  -AC     Time Frame (Transfer Goal 1, PT) long term goal (LTG);10 days  -AC       Row Name 10/23/24 1442          Gait Training Goal 1 (PT)    Activity/Assistive Device (Gait Training Goal 1, PT) gait (walking locomotion);assistive device use;decrease fall risk;improve balance and speed;increase endurance/gait distance;increase energy conservation  -AC     [place] Level (Gait Training Goal 1, PT) standby assist  -AC     Distance (Gait Training Goal 1, PT) 150  -AC     Time Frame (Gait Training Goal 1, PT) long term goal (LTG);10 days  -AC       Row Name 10/23/24 1442          Stairs Goal 1 (PT)    Activity/Assistive Device (Stairs Goal 1, PT) stairs, all skills  -AC     [place] Level/Cues Needed (Stairs Goal 1, PT) standby assist  -AC     Number of Stairs (Stairs Goal 1, PT) 3  -AC     Time Frame (Stairs Goal 1, PT) long term goal (LTG);10 days  -AC       Row Name 10/23/24 1442          Therapy Assessment/Plan (PT)    Planned Therapy Interventions (PT) balance training;bed mobility training;gait training;patient/family education;transfer training;stair training;strengthening  -AC               User Key  (r) = Recorded By, (t) = Taken By, (c) = Cosigned By      Initials Name Provider Type    AC Shaylee Gonzalez, PT Physical Therapist                   Clinical Impression       Row Name 10/23/24 1437          Pain    Pretreatment Pain Rating 0/10 - no pain  -AC     Posttreatment Pain Rating 0/10 - no pain  -AC       Row Name 10/23/24 1437          Plan of  Care Review    Plan of Care Reviewed With patient  -AC     Outcome Evaluation PT initial evaluation complete. Pt presents with impaired cognition, impaired walking balance, and decreased activity tolerance. Pt ambulated 40t w/ CGAx1 and FWW requiring VC for upright posture, forward gaze, and body position in walker. In addition pt required frequent redirection throughout session. Pt lives alone and is typically independently w/ all mobility. Pt would benefit from IRF at d/c in addition to continued skilled therapy while hospitalized to maximize functional independence and decrease risk of falls.  -AC       Row Name 10/23/24 1437          Therapy Assessment/Plan (PT)    Rehab Potential (PT) good  -AC     Therapy Frequency (PT) daily  -AC     Predicted Duration of Therapy Intervention (PT) 10 days  -AC       Row Name 10/23/24 1437          Vital Signs    Pre Systolic BP Rehab 148  -AC     Pre Treatment Diastolic BP 78  -AC     Pretreatment Heart Rate (beats/min) 73  -AC     Posttreatment Heart Rate (beats/min) 71  -AC     Pre SpO2 (%) 93  -AC     O2 Delivery Pre Treatment room air  -AC     O2 Delivery Intra Treatment room air  -AC     Post SpO2 (%) 93  -AC     O2 Delivery Post Treatment room air  -AC     Pre Patient Position Sitting  -AC     Intra Patient Position Standing  -AC     Post Patient Position Sitting  -AC       Row Name 10/23/24 1437          Positioning and Restraints    Pre-Treatment Position sitting in chair/recliner  -AC     Post Treatment Position chair  -AC     In Chair notified nsg;reclined;sitting;call light within reach;encouraged to call for assist;exit alarm on;waffle cushion;legs elevated  -AC               User Key  (r) = Recorded By, (t) = Taken By, (c) = Cosigned By      Initials Name Provider Type    AC Shaylee Gonzalez, PT Physical Therapist                   Outcome Measures       Row Name 10/23/24 1444 10/23/24 0800       How much help from another person do you currently need...    Turning  from your back to your side while in flat bed without using bedrails? 3  -AC 3  -SB    Moving from lying on back to sitting on the side of a flat bed without bedrails? 3  -AC 3  -SB    Moving to and from a bed to a chair (including a wheelchair)? 3  -AC 3  -SB    Standing up from a chair using your arms (e.g., wheelchair, bedside chair)? 3  -AC 3  -SB    Climbing 3-5 steps with a railing? 2  -AC 3  -SB    To walk in hospital room? 3  -AC 3  -SB    AM-PAC 6 Clicks Score (PT) 17  -AC 18  -SB    Highest Level of Mobility Goal 5 --> Static standing  -AC 6 --> Walk 10 steps or more  -SB      Row Name 10/23/24 1444          Functional Assessment    Outcome Measure Options AM-PAC 6 Clicks Basic Mobility (PT)  -               User Key  (r) = Recorded By, (t) = Taken By, (c) = Cosigned By      Initials Name Provider Type     Ester Santa, RN Registered Nurse    AC Shaylee Gonzalez PT Physical Therapist                                 Physical Therapy Education       Title: PT OT SLP Therapies (In Progress)       Topic: Physical Therapy (In Progress)       Point: Mobility training (In Progress)       Learning Progress Summary            Patient Acceptance, E, NR by  at 10/23/2024 1445                      Point: Home exercise program (Not Started)       Learner Progress:  Not documented in this visit.              Point: Body mechanics (In Progress)       Learning Progress Summary            Patient Acceptance, E, NR by  at 10/23/2024 1445                      Point: Precautions (In Progress)       Learning Progress Summary            Patient Acceptance, E, NR by  at 10/23/2024 1445                                      User Key       Initials Effective Dates Name Provider Type Discipline     07/11/24 -  Shaylee Gonzalez, KYLIE Physical Therapist PT                  PT Recommendation and Plan  Planned Therapy Interventions (PT): balance training, bed mobility training, gait training, patient/family education, transfer  training, stair training, strengthening  Outcome Evaluation: PT initial evaluation complete. Pt presents with impaired cognition, impaired walking balance, and decreased activity tolerance. Pt ambulated 40t w/ CGAx1 and FWW requiring VC for upright posture, forward gaze, and body position in walker. In addition pt required frequent redirection throughout session. Pt lives alone and is typically independently w/ all mobility. Pt would benefit from IRF at d/c in addition to continued skilled therapy while hospitalized to maximize functional independence and decrease risk of falls.     Time Calculation:   PT Evaluation Complexity  History, PT Evaluation Complexity: 1-2 personal factors and/or comorbidities  Examination of Body Systems (PT Eval Complexity): total of 3 or more elements  Clinical Presentation (PT Evaluation Complexity): evolving  Clinical Decision Making (PT Evaluation Complexity): moderate complexity  Overall Complexity (PT Evaluation Complexity): moderate complexity     PT Charges       Row Name 10/23/24 1446             Time Calculation    Start Time 1042  -AC      PT Received On 10/23/24  -AC      PT Goal Re-Cert Due Date 11/02/24  -AC         Time Calculation- PT    Total Timed Code Minutes- PT 12 minute(s)  -AC         Timed Charges    59500 - Gait Training Minutes  12  -AC         Untimed Charges    PT Eval/Re-eval Minutes 37  -AC         Total Minutes    Timed Charges Total Minutes 12  -AC      Untimed Charges Total Minutes 37  -AC       Total Minutes 49  -AC                User Key  (r) = Recorded By, (t) = Taken By, (c) = Cosigned By      Initials Name Provider Type    AC Shaylee Gonzalez, PT Physical Therapist                  Therapy Charges for Today       Code Description Service Date Service Provider Modifiers Qty    21759905979 HC GAIT TRAINING EA 15 MIN 10/23/2024 Shaylee Gonzalez, PT GP 1    [id number] HC PT EVAL MOD COMPLEXITY 3 10/23/2024 Shaylee Gonzalez, PT GP 1            PT G-Codes  Outcome  Measure Options: AM-PAC 6 Clicks Basic Mobility (PT)  AM-PAC 6 Clicks Score (PT): 17  PT Discharge Summary  Anticipated Discharge Disposition (PT): inpatient rehabilitation facility    Shaylee Gonzalez, KYLIE  10/23/2024

## 2024-10-23 NOTE — PROGRESS NOTES
[facility] Medicine Services  PROGRESS NOTE    Patient Name: Billie Yu  : 1935  MRN: [id number]    Date of Admission: 10/22/2024  Primary Care Physician: Elvi Ventura MD    Subjective   Subjective     CC:  Mechanical fall at home    HPI:  Evaluated patient this morning. Feeling mostly well. Only complaint was some pain from anterior hand abrasions. Working with PT. Does not feel ready for home.      Objective   Objective     Vital Signs:   Temp:  [97.7 °F (36.5 °C)-99 °F (37.2 °C)] 98.2 °F (36.8 °C)  Heart Rate:  [62-75] 62  Resp:  [16-20] 16  BP: ()/(43-83) 127/55     Physical Exam:  Constitutional: Awake, alert, resting comfortably  HENT: Ecchymosis of right eyelid and periorbital area  Respiratory: Clear to auscultation bilaterally, respiratory effort normal   Cardiovascular: RRR, no murmurs, rubs, or gallops  Gastrointestinal: soft, nontender, nondistended  Musculoskeletal: No bilateral ankle edema  Psychiatric: Appropriate affect, cooperative  Neurologic: Alert, oriented x 3, no focal deficits, speech clear  Skin: Anterior hand abrasions covered with bandage      Results Reviewed:  LAB RESULTS:      Lab 10/23/24  0903 10/22/24  1020   WBC 7.97 9.46   HEMOGLOBIN 13.4 13.8   HEMATOCRIT 40.2 42.4   PLATELETS 209 209   NEUTROS ABS 6.59  --    IMMATURE GRANS (ABS) 0.02  --    LYMPHS ABS 0.57*  --    MONOS ABS 0.73  --    EOS ABS 0.04  --    MCV 94.4 94.4         Lab 10/23/24  0903 10/22/24  1020   SODIUM 134* 135*   POTASSIUM 4.3 4.4   CHLORIDE 100 97*   CO2 25.0 28.3   ANION GAP 9.0 9.7   BUN 23 25*   CREATININE 0.55* 0.72   EGFR 89.4 81.5   GLUCOSE 113* 110*   CALCIUM 9.1 9.7   HEMOGLOBIN A1C  --  5.70*   TSH  --  1.380         Lab 10/23/24  0903 10/22/24  1020   TOTAL PROTEIN 6.7 7.1   ALBUMIN 3.7 4.1   GLOBULIN 3.0 3.0   ALT (SGPT) 16 20   AST (SGOT) 20 21   BILIRUBIN 0.5 0.5   ALK PHOS 69 68             Lab 10/22/24  1020   CHOLESTEROL 186   LDL CHOL 90   HDL CHOL  84*   TRIGLYCERIDES 66             Brief Urine Lab Results  (Last result in the past 365 days)        Color   Clarity   Blood   Leuk Est   Nitrite   Protein   CREAT   Urine HCG        10/22/24 2004 Yellow   Clear   Large (3+)   Trace   Negative   30 mg/dL (1+)                   Microbiology Results Abnormal       None            CT Chest Without Contrast Diagnostic    Result Date: 10/22/2024  CT CHEST WO CONTRAST DIAGNOSTIC Date of Exam: 10/22/2024 6:28 PM EDT Indication: Left apical abnormality evaluation seen on cervical CT. Comparison: Same day cervical spine CT Technique: Axial CT images were obtained of the chest without contrast administration.  Reconstructed coronal and sagittal images were also obtained. Automated exposure control and iterative construction methods were used. Findings: Lower Neck: Unremarkable. Hilum and Mediastinum: Moderate-sized hiatal/paraesophageal hernia. Possible mild wall thickening of the distal esophagus. Mild enlargement of the central pulmonary arteries, nonspecific but may represent pulmonary arterial hypertension. Vascular calcifications of the thoracic aorta. Otherwise the visualized vasculature are unremarkable. Normal size heart without pericardial effusion. No definite lymphadenopathy Lung Parenchyma and Pleura: No pleural effusion or pneumothorax. Multiple scattered focal patchy opacities noted within the lungs bilaterally, predominantly within the upper lobes and left lower lobe.. Within these regions, there are also multiple areas of nodular opacity, the largest of which measures 1.2 cm in the left upper lobe. No consolidation. The central airways are patent. Upper Abdomen: No acute process. Soft tissues: Patient is status post right mastectomy. Otherwise unremarkable Osseous structures: No definite acute osseous abnormality. Severe S-shaped scoliosis. Mild to moderate compression deformities of the T8 and T9 vertebral bodies, most likely chronic.     Impression:  Impression: Areas of patchy opacities and nodular changes noted within the lungs bilaterally. This is a nonspecific finding but most likely represents a chronic infectious/inflammatory process. Recommend follow-up chest CT in 3 months after resolution of symptoms to  exclude underlying pulmonary lesion/mass. Age indeterminant mild to moderate compression deformities of the T8 and T9 vertebral bodies. These are most likely chronic. Please correlate with point tenderness. Moderate hiatal/paraesophageal hernia. There is apparent wall thickening of the distal esophagus, which may represent esophagitis. If there is clinical concern for an underlying lesion, please consider follow-up with dedicated outpatient endoscopic evaluation. Electronically Signed: Bandar Monge DO  10/22/2024 6:53 PM EDT  Workstation ID: [id number]    CT Head Without Contrast    Result Date: 10/22/2024  CT HEAD WO CONTRAST, CT CERVICAL SPINE WO CONTRAST Date of Exam: 10/22/2024 5:47 PM EDT Indication: Head injury on blood thinners. Comparison: Head CT 6/19/2024 Technique: Axial CT images were obtained of the head and cervical spine without contrast administration.  Automated exposure control and iterative construction methods were used. Findings: CT HEAD: There is no evidence of acute infarction. There is no acute intracranial hemorrhage. There are no extra-axial collections. Ventricles and CSF spaces are symmetrically prominent. No mass effect nor hydrocephalus. Brain parenchyma appears unchanged.  Paranasal sinuses and mastoid air cells are adequately aerated.  Osseous structures and orbits appear intact. There is a right forehead hematoma. CT CERVICAL SPINE: OSSEOUS STRUCTURES: No fracture nor bony destructive process. ALIGNMENT:  Normal. There is right convex curvature of the mid cervical spine. DISC SPACE: There is moderate intervertebral disc space narrowing and endplate osteophyte formation along the inner margin of the cervical  curvature. JOINTS: Advanced facet arthropathy with more mild uncovertebral hypertrophy. There is multilevel facet fusion. SOFT TISSUES: There is a 2.3 cm lower pole right thyroid nodule. There is a 2 cm lower pole left thyroid nodule. No follow-up suggested given patient's age. There is moderate to advanced carotid atherosclerotic disease. LUNG APICES: There are few irregular nodular opacities in the left lung apex potentially infectious. However, neoplasm not excluded. Nonemergent follow-up noncontrast chest CT recommended. LEVELS: No significant osseous central canal nor foraminal stenosis identified at any level.     Impression: Impression: 1.Right forehead hematoma. No acute intracranial process is identified. 2.Cervical degenerative changes without acute injury identified. 3.Left apical pulmonary opacities potentially infectious. Nonemergent follow-up noncontrast chest CT recommended. Electronically Signed: Osei Cardona MD  10/22/2024 6:03 PM EDT  Workstation ID: [id number]    CT Cervical Spine Without Contrast    Result Date: 10/22/2024  CT HEAD WO CONTRAST, CT CERVICAL SPINE WO CONTRAST Date of Exam: 10/22/2024 5:47 PM EDT Indication: Head injury on blood thinners. Comparison: Head CT 6/19/2024 Technique: Axial CT images were obtained of the head and cervical spine without contrast administration.  Automated exposure control and iterative construction methods were used. Findings: CT HEAD: There is no evidence of acute infarction. There is no acute intracranial hemorrhage. There are no extra-axial collections. Ventricles and CSF spaces are symmetrically prominent. No mass effect nor hydrocephalus. Brain parenchyma appears unchanged.  Paranasal sinuses and mastoid air cells are adequately aerated.  Osseous structures and orbits appear intact. There is a right forehead hematoma. CT CERVICAL SPINE: OSSEOUS STRUCTURES: No fracture nor bony destructive process. ALIGNMENT:  Normal. There is right convex curvature of  the mid cervical spine. DISC SPACE: There is moderate intervertebral disc space narrowing and endplate osteophyte formation along the inner margin of the cervical curvature. JOINTS: Advanced facet arthropathy with more mild uncovertebral hypertrophy. There is multilevel facet fusion. SOFT TISSUES: There is a 2.3 cm lower pole right thyroid nodule. There is a 2 cm lower pole left thyroid nodule. No follow-up suggested given patient's age. There is moderate to advanced carotid atherosclerotic disease. LUNG APICES: There are few irregular nodular opacities in the left lung apex potentially infectious. However, neoplasm not excluded. Nonemergent follow-up noncontrast chest CT recommended. LEVELS: No significant osseous central canal nor foraminal stenosis identified at any level.     Impression: Impression: 1.Right forehead hematoma. No acute intracranial process is identified. 2.Cervical degenerative changes without acute injury identified. 3.Left apical pulmonary opacities potentially infectious. Nonemergent follow-up noncontrast chest CT recommended. Electronically Signed: Osei Cardona MD  10/22/2024 6:03 PM EDT  Workstation ID: [id number]     Results for orders placed during the hospital encounter of 06/19/24    Adult Transthoracic Echo Complete With Contrast if Necessary Per Protocol    Interpretation Summary    Left ventricular ejection fraction appears to be in the lower limits of normal    Left atrial volume is moderately increased.    The right atrial cavity is borderline dilated.    Moderate aortic valve regurgitation is present.    Mild aortic valve stenosis is present. Aortic valve area is 1.2 cm2.    Peak velocity of the flow distal to the aortic valve is 277 cm/s. Aortic valve maximum pressure gradient is 31 mmHg. Aortic valve mean pressure gradient is 17 mmHg. Aortic valve dimensionless index is 0.39 .    Estimated right ventricular systolic pressure from tricuspid regurgitation is mildly elevated (35-45  mmHg).      Current medications:  Scheduled Meds:acetaminophen, 1,000 mg, Oral, TID  amiodarone, 200 mg, Oral, Daily  apixaban, 2.5 mg, Oral, Q12H  donepezil, 10 mg, Oral, Nightly  lisinopril, 10 mg, Oral, Daily  sodium chloride, 10 mL, Intravenous, Q12H  spironolactone, 25 mg, Oral, Daily      Continuous Infusions:   PRN Meds:.  senna-docusate sodium **AND** polyethylene glycol **AND** bisacodyl **AND** bisacodyl    Calcium Replacement - Follow Nurse / BPA Driven Protocol    Magnesium Standard Dose Replacement - Follow Nurse / BPA Driven Protocol    melatonin    Phosphorus Replacement - Follow Nurse / BPA Driven Protocol    Potassium Replacement - Follow Nurse / BPA Driven Protocol    sodium chloride    Tetanus-Diphth-Acell Pertussis    Assessment & Plan   Assessment & Plan     Active Hospital Problems    Diagnosis  POA    **Fall [W19.XXXA]  Yes    Traumatic hematoma of forehead [S00.83XA]  Unknown    Generalized weakness [R53.1]  Unknown    Hard of hearing [H91.90]  Unknown    PAF (paroxysmal atrial fibrillation) [I48.0]  Yes    Memory impairment [R41.3]  Yes      Resolved Hospital Problems   No resolved problems to display.        Brief Hospital Course to date:  Billie Yu is a 88 y.o. female with significant medical history for paroxysmal atrial fibrillation, chronic anticoagulation, syncope, presents to [facility] after a fall at home sustaining a head injury.     Mechanical fall with head injury  Generalized weakness  -CT head showed right forehead hematoma, otherwise nonacute  -PT/OT evaluated, recommended IPR on discharge.    Hiatal / paraesophageal hernia  -CT chest showed wall thickening of distal esophagus, ?may represent esophagitis.    -Consider outpatient endoscopic evaluation if any symptoms occur.     Nodular changes bilateral lungs  -CT chest showed patchy opacities and nodular changes within lungs bilaterally, may represent chronic infectious/inflammatory process.  -Recommend  follow-up chest CT in 3 months to exclude underlying pulmonary lesion/mass.     HTN- continue lisinopril, spironolactone.  Atrial fibrillation- continue amiodarone, Eliquis.  Dementia- continue donepezil.  Hard of hearing- Patient wears hearing aids, not present on admission.      Expected Discharge Location and Transportation: [facility]  Expected Discharge   Expected Discharge Date: 10/25/2024; Expected Discharge Time:      VTE Prophylaxis:  Pharmacologic & mechanical VTE prophylaxis orders are present.         AM-PAC 6 Clicks Score (PT): 17 (10/23/24 1444)    CODE STATUS:   Code Status and Medical Interventions: CPR (Attempt to Resuscitate); Full Support   Ordered at: 10/22/24 2043     Level Of Support Discussed With:    Patient     Code Status (Patient has no pulse and is not breathing):    CPR (Attempt to Resuscitate)     Medical Interventions (Patient has pulse or is breathing):    Full Support       Leslie Rojas, DO  10/23/24

## 2024-10-23 NOTE — CASE MANAGEMENT/SOCIAL WORK
Discharge Planning Assessment  [facility]     Patient Name: Billie Yu  MRN: [id number]  Today's Date: 10/23/2024    Admit Date: 10/22/2024    Plan: Home   Discharge Needs Assessment       Row Name 10/23/24 1044       Living Environment    People in Home alone    Current Living Arrangements home    Primary Care Provided by self    Provides Primary Care For no one    Family Caregiver if Needed child(mariama), adult    Quality of Family Relationships unable to assess    Able to Return to Prior Arrangements yes       Resource/Environmental Concerns    Resource/Environmental Concerns none    Transportation Concerns none       Transition Planning    Patient/Family Anticipates Transition to home    Patient/Family Anticipated Services at Transition ;rehabilitation services    Transportation Anticipated family or friend will provide       Discharge Needs Assessment    Readmission Within the Last 30 Days no previous admission in last 30 days    Equipment Currently Used at Home walker, rolling;rollator;shower chair;cane, straight    Concerns to be Addressed discharge planning    Anticipated Changes Related to Illness none    Equipment Needed After Discharge none                   Discharge Plan       Row Name 10/23/24 1044       Plan    Plan Home    Patient/Family in Agreement with Plan yes    Plan Comments Spoke with patient in room to initiate discharge planning.  She lives alone in [place].  She is independent with ADL's.  She has a straight cane, rolling walker, rollator and shower chair at home.  She is not current with home health.  Her PCP is Elvi Ventura.  She ha armstrong advanced directive.  Verified that she has [employer] Medicare Replacement.  Ms. Yu has RX coverage and has her scripts [facility].  Her goal is to return home at discharge.  Will await therapy recommendations to determine proper discharge placement.  CM will continue to follow.    Final Discharge Disposition Code 01 -  home or self-care                  Continued Care and Services - Admitted Since 10/22/2024    No active coordination exists for this encounter.       Expected Discharge Date and Time       Expected Discharge Date Expected Discharge Time    Oct 25, 2024            Demographic Summary       Row Name 10/23/24 1043       General Information    Admission Type observation    Arrived From emergency department    Referral Source admission list    Reason for Consult discharge planning    Preferred Language English                   Functional Status       Row Name 10/23/24 1043       Functional Status    Usual Activity Tolerance fair    Current Activity Tolerance fair       Functional Status, IADL    Medications assistive equipment    Meal Preparation assistive equipment    Housekeeping assistive equipment    Laundry assistive equipment    Shopping assistive equipment                   Psychosocial    No documentation.                  Abuse/Neglect    No documentation.                  Legal    No documentation.                  Substance Abuse    No documentation.                  Patient Forms    No documentation.                     Harriett Hillman RN

## 2024-10-23 NOTE — PLAN OF CARE
Problem: Adult Inpatient Plan of Care  Goal: Absence of Hospital-Acquired Illness or Injury  Intervention: Identify and Manage Fall Risk  Recent Flowsheet Documentation  Taken 10/23/2024 1800 by Ester Santa RN  Safety Promotion/Fall Prevention:   activity supervised   assistive device/personal items within reach   clutter free environment maintained   lighting adjusted   room organization consistent   safety round/check completed  Taken 10/23/2024 1600 by Ester Santa RN  Safety Promotion/Fall Prevention:   activity supervised   assistive device/personal items within reach   clutter free environment maintained   lighting adjusted   room organization consistent   safety round/check completed  Taken 10/23/2024 1400 by Ester Santa RN  Safety Promotion/Fall Prevention:   assistive device/personal items within reach   activity supervised   clutter free environment maintained   lighting adjusted   room organization consistent   safety round/check completed  Taken 10/23/2024 1200 by Ester Santa RN  Safety Promotion/Fall Prevention:   activity supervised   assistive device/personal items within reach   clutter free environment maintained   lighting adjusted   room organization consistent   safety round/check completed  Taken 10/23/2024 1000 by Ester Santa RN  Safety Promotion/Fall Prevention:   activity supervised   assistive device/personal items within reach   clutter free environment maintained   lighting adjusted   room organization consistent   safety round/check completed  Taken 10/23/2024 0800 by Ester Santa RN  Safety Promotion/Fall Prevention:   activity supervised   assistive device/personal items within reach   clutter free environment maintained   lighting adjusted   room organization consistent   safety round/check completed     Problem: Adult Inpatient Plan of Care  Goal: Absence of Hospital-Acquired Illness or Injury  Intervention: Prevent Skin Injury  Recent Flowsheet  Documentation  Taken 10/23/2024 1800 by Ester Santa RN  Body Position: position changed independently  Taken 10/23/2024 1600 by Ester Santa RN  Body Position: position changed independently  Taken 10/23/2024 1400 by Ester Santa RN  Body Position: position changed independently  Taken 10/23/2024 1200 by Ester Santa RN  Body Position: position changed independently  Taken 10/23/2024 1000 by Ester Santa RN  Body Position: position changed independently  Taken 10/23/2024 0800 by Ester Santa RN  Body Position: position changed independently  Skin Protection: incontinence pads utilized     Problem: Adult Inpatient Plan of Care  Goal: Optimal Comfort and Wellbeing  Intervention: Provide Person-Centered Care  Recent Flowsheet Documentation  Taken 10/23/2024 0800 by Ester Santa RN  Trust Relationship/Rapport:   care explained   empathic listening provided   emotional support provided   thoughts/feelings acknowledged   Goal Outcome Evaluation:

## 2024-10-24 PROCEDURE — G0378 HOSPITAL OBSERVATION PER HR: HCPCS

## 2024-10-24 PROCEDURE — 97535 SELF CARE MNGMENT TRAINING: CPT

## 2024-10-24 PROCEDURE — 97166 OT EVAL MOD COMPLEX 45 MIN: CPT

## 2024-10-24 PROCEDURE — 99232 SBSQ HOSP IP/OBS MODERATE 35: CPT | Performed by: STUDENT IN AN ORGANIZED HEALTH CARE EDUCATION/TRAINING PROGRAM

## 2024-10-24 RX ORDER — GUAIFENESIN 200 MG/10ML
200 LIQUID ORAL EVERY 4 HOURS PRN
Status: DISCONTINUED | OUTPATIENT
Start: 2024-10-24 | End: 2024-10-25

## 2024-10-24 RX ADMIN — APIXABAN 2.5 MG: 2.5 TABLET, FILM COATED ORAL at 10:42

## 2024-10-24 RX ADMIN — SENNOSIDES AND DOCUSATE SODIUM 2 TABLET: 50; 8.6 TABLET ORAL at 09:57

## 2024-10-24 RX ADMIN — LISINOPRIL 10 MG: 10 TABLET ORAL at 09:40

## 2024-10-24 RX ADMIN — ACETAMINOPHEN 650 MG: 325 TABLET ORAL at 21:11

## 2024-10-24 RX ADMIN — ACETAMINOPHEN 1000 MG: 500 TABLET ORAL at 09:40

## 2024-10-24 RX ADMIN — SPIRONOLACTONE 25 MG: 25 TABLET ORAL at 09:40

## 2024-10-24 RX ADMIN — GUAIFENESIN 200 MG: 200 SOLUTION ORAL at 22:48

## 2024-10-24 RX ADMIN — DONEPEZIL HYDROCHLORIDE 10 MG: 10 TABLET, FILM COATED ORAL at 21:11

## 2024-10-24 RX ADMIN — GUAIFENESIN 200 MG: 200 SOLUTION ORAL at 09:57

## 2024-10-24 RX ADMIN — APIXABAN 2.5 MG: 2.5 TABLET, FILM COATED ORAL at 21:11

## 2024-10-24 RX ADMIN — AMIODARONE HYDROCHLORIDE 200 MG: 200 TABLET ORAL at 09:40

## 2024-10-24 RX ADMIN — Medication 10 ML: at 09:40

## 2024-10-24 RX ADMIN — Medication 10 ML: at 21:21

## 2024-10-24 NOTE — PLAN OF CARE
Problem: Adult Inpatient Plan of Care  Goal: Plan of Care Review  Outcome: Progressing  Goal: Patient-Specific Goal (Individualized)  Outcome: Progressing  Goal: Absence of Hospital-Acquired Illness or Injury  Outcome: Progressing  Intervention: Identify and Manage Fall Risk  Recent Flowsheet Documentation  Taken 10/24/2024 0400 by Sangeetha Garcia RN  Safety Promotion/Fall Prevention:   activity supervised   assistive device/personal items within reach   clutter free environment maintained   fall prevention program maintained   lighting adjusted   nonskid shoes/slippers when out of bed   room organization consistent   safety round/check completed  Taken 10/24/2024 0200 by Sangeetha Garcia RN  Safety Promotion/Fall Prevention:   activity supervised   assistive device/personal items within reach   clutter free environment maintained   fall prevention program maintained   lighting adjusted   nonskid shoes/slippers when out of bed   room organization consistent   safety round/check completed  Taken 10/24/2024 0000 by Sangeetha Garcia RN  Safety Promotion/Fall Prevention:   activity supervised   assistive device/personal items within reach   clutter free environment maintained   fall prevention program maintained   lighting adjusted   nonskid shoes/slippers when out of bed   room organization consistent   safety round/check completed  Taken 10/23/2024 2200 by Sangeetha Garcia RN  Safety Promotion/Fall Prevention:   activity supervised   assistive device/personal items within reach   clutter free environment maintained   fall prevention program maintained   lighting adjusted   nonskid shoes/slippers when out of bed   room organization consistent   safety round/check completed  Taken 10/23/2024 2000 by Sangeetha Garcia RN  Safety Promotion/Fall Prevention:   activity supervised   assistive device/personal items within reach   clutter free environment maintained   fall prevention program maintained   lighting  adjusted   nonskid shoes/slippers when out of bed   safety round/check completed   room organization consistent  Intervention: Prevent Skin Injury  Recent Flowsheet Documentation  Taken 10/24/2024 0400 by Sangeetha Garcia RN  Body Position:   position changed independently   supine   legs elevated  Skin Protection:   incontinence pads utilized   transparent dressing maintained  Taken 10/24/2024 0200 by Sangeetha Garcia RN  Body Position:   position changed independently   supine   turned   right  Skin Protection:   incontinence pads utilized   transparent dressing maintained  Taken 10/24/2024 0000 by Sangeetha Garcia RN  Body Position:   position changed independently   sitting up in bed  Skin Protection:   incontinence pads utilized   transparent dressing maintained  Taken 10/23/2024 2200 by Sangeetha Garcia RN  Body Position:   position changed independently   sitting up in bed  Skin Protection:   incontinence pads utilized   transparent dressing maintained  Taken 10/23/2024 2000 by Sangeetha Garcia RN  Body Position:   position changed independently   supine   legs elevated  Skin Protection:   incontinence pads utilized   transparent dressing maintained  Intervention: Prevent and Manage VTE (Venous Thromboembolism) Risk  Recent Flowsheet Documentation  Taken 10/23/2024 2000 by Sangeetha Garcia RN  VTE Prevention/Management:   bilateral   SCDs (sequential compression devices) off   patient refused intervention  Intervention: Prevent Infection  Recent Flowsheet Documentation  Taken 10/24/2024 0400 by Sangeetha Garcia RN  Infection Prevention:   cohorting utilized   environmental surveillance performed   equipment surfaces disinfected   hand hygiene promoted   rest/sleep promoted  Taken 10/24/2024 0200 by Sangeetha Garcia RN  Infection Prevention:   cohorting utilized   environmental surveillance performed   equipment surfaces disinfected   hand hygiene promoted   rest/sleep promoted  Taken 10/24/2024 0000  by Sangeetha Garcia RN  Infection Prevention:   cohorting utilized   environmental surveillance performed   equipment surfaces disinfected   hand hygiene promoted   rest/sleep promoted  Taken 10/23/2024 2200 by Sangeetha Garcia RN  Infection Prevention:   cohorting utilized   environmental surveillance performed   equipment surfaces disinfected   hand hygiene promoted   rest/sleep promoted  Taken 10/23/2024 2000 by Sangeetha Garcia RN  Infection Prevention:   cohorting utilized   environmental surveillance performed   equipment surfaces disinfected   hand hygiene promoted   rest/sleep promoted  Goal: Optimal Comfort and Wellbeing  Outcome: Progressing  Intervention: Provide Person-Centered Care  Recent Flowsheet Documentation  Taken 10/23/2024 2000 by Sangeetha Garcia RN  Trust Relationship/Rapport:   care explained   choices provided   empathic listening provided   emotional support provided   questions answered   questions encouraged   thoughts/feelings acknowledged   reassurance provided  Goal: Readiness for Transition of Care  Outcome: Progressing     Problem: Pain Acute  Goal: Optimal Pain Control and Function  Outcome: Progressing  Intervention: Optimize Psychosocial Wellbeing  Recent Flowsheet Documentation  Taken 10/24/2024 0400 by Sangeetha Garcia RN  Supportive Measures: active listening utilized  Taken 10/24/2024 0200 by Sangeetha Garcia RN  Supportive Measures: active listening utilized  Taken 10/24/2024 0000 by Sangeetha Garcia RN  Supportive Measures: active listening utilized  Taken 10/23/2024 2200 by Sangeetha Garcia RN  Supportive Measures: active listening utilized  Taken 10/23/2024 2000 by Sangeetha Garcia RN  Supportive Measures:   active listening utilized   goal-setting facilitated  Diversional Activities: television  Intervention: Prevent or Manage Pain  Recent Flowsheet Documentation  Taken 10/24/2024 0400 by Sangeetha Garcia RN  Sensory Stimulation Regulation:   care clustered    lighting decreased  Sleep/Rest Enhancement:   awakenings minimized   consistent schedule promoted  Taken 10/24/2024 0200 by Sangeetha Garcia RN  Sensory Stimulation Regulation:   care clustered   lighting decreased  Sleep/Rest Enhancement: awakenings minimized  Taken 10/24/2024 0000 by Sangeetha Garcia RN  Sensory Stimulation Regulation:   care clustered   lighting decreased  Sleep/Rest Enhancement: awakenings minimized  Taken 10/23/2024 2200 by Sangeetha Garcia RN  Sensory Stimulation Regulation:   care clustered   lighting decreased  Sleep/Rest Enhancement: awakenings minimized  Taken 10/23/2024 2000 by Sangeetha Garcia RN  Sensory Stimulation Regulation:   care clustered   lighting decreased  Bowel Elimination Promotion: (prn docusate given) privacy promoted  Sleep/Rest Enhancement:   awakenings minimized   consistent schedule promoted   regular sleep/rest pattern promoted   relaxation techniques promoted  Medication Review/Management: medications reviewed     Problem: Fall Injury Risk  Goal: Absence of Fall and Fall-Related Injury  Outcome: Progressing  Intervention: Identify and Manage Contributors  Recent Flowsheet Documentation  Taken 10/24/2024 0400 by Sangeetha Garcia RN  Self-Care Promotion: independence encouraged  Taken 10/24/2024 0200 by Sangeetha Garcia RN  Self-Care Promotion: independence encouraged  Taken 10/24/2024 0000 by Sangeetha Garcia RN  Self-Care Promotion: independence encouraged  Taken 10/23/2024 2200 by Sangeetha Garcia RN  Self-Care Promotion: independence encouraged  Taken 10/23/2024 2000 by Sangeetha Garcia RN  Medication Review/Management: medications reviewed  Self-Care Promotion: independence encouraged  Intervention: Promote Injury-Free Environment  Recent Flowsheet Documentation  Taken 10/24/2024 0400 by Sangeetha Garcia RN  Safety Promotion/Fall Prevention:   activity supervised   assistive device/personal items within reach   clutter free environment maintained   fall  prevention program maintained   lighting adjusted   nonskid shoes/slippers when out of bed   room organization consistent   safety round/check completed  Taken 10/24/2024 0200 by Sangeetha Garcia, RN  Safety Promotion/Fall Prevention:   activity supervised   assistive device/personal items within reach   clutter free environment maintained   fall prevention program maintained   lighting adjusted   nonskid shoes/slippers when out of bed   room organization consistent   safety round/check completed  Taken 10/24/2024 0000 by Sangeetha Garcia, RN  Safety Promotion/Fall Prevention:   activity supervised   assistive device/personal items within reach   clutter free environment maintained   fall prevention program maintained   lighting adjusted   nonskid shoes/slippers when out of bed   room organization consistent   safety round/check completed  Taken 10/23/2024 2200 by Sangeetha Garcia, RN  Safety Promotion/Fall Prevention:   activity supervised   assistive device/personal items within reach   clutter free environment maintained   fall prevention program maintained   lighting adjusted   nonskid shoes/slippers when out of bed   room organization consistent   safety round/check completed  Taken 10/23/2024 2000 by Sangeetha Garcia, RN  Safety Promotion/Fall Prevention:   activity supervised   assistive device/personal items within reach   clutter free environment maintained   fall prevention program maintained   lighting adjusted   nonskid shoes/slippers when out of bed   safety round/check completed   room organization consistent     Problem: Skin Injury Risk Increased  Goal: Skin Health and Integrity  Outcome: Progressing  Intervention: Optimize Skin Protection  Recent Flowsheet Documentation  Taken 10/24/2024 0400 by Sangeetha Garcia, RN  Activity Management: activity encouraged  Pressure Reduction Techniques:   frequent weight shift encouraged   weight shift assistance provided   pressure points protected  Head of Bed  (HOB) Positioning: HOB elevated  Pressure Reduction Devices:   pressure-redistributing mattress utilized   positioning supports utilized  Skin Protection:   incontinence pads utilized   transparent dressing maintained  Taken 10/24/2024 0200 by Sangeetha Garcia RN  Activity Management: activity encouraged  Pressure Reduction Techniques:   frequent weight shift encouraged   weight shift assistance provided   pressure points protected  Head of Bed (HOB) Positioning: hospitals elevated  Pressure Reduction Devices:   pressure-redistributing mattress utilized   positioning supports utilized  Skin Protection:   incontinence pads utilized   transparent dressing maintained  Taken 10/24/2024 0000 by Sangeetha Garcia RN  Activity Management: activity encouraged  Pressure Reduction Techniques:   frequent weight shift encouraged   weight shift assistance provided   pressure points protected  Head of Bed (HOB) Positioning: hospitals elevated  Pressure Reduction Devices:   pressure-redistributing mattress utilized   positioning supports utilized  Skin Protection:   incontinence pads utilized   transparent dressing maintained  Taken 10/23/2024 2200 by Sangeetha Garcia RN  Activity Management: activity encouraged  Pressure Reduction Techniques:   frequent weight shift encouraged   weight shift assistance provided   pressure points protected  Head of Bed (HOB) Positioning: hospitals elevated  Pressure Reduction Devices:   pressure-redistributing mattress utilized   positioning supports utilized  Skin Protection:   incontinence pads utilized   transparent dressing maintained  Taken 10/23/2024 2000 by Sangeetha Garcia RN  Activity Management: up in chair  Pressure Reduction Techniques:   frequent weight shift encouraged   weight shift assistance provided   pressure points protected  Head of Bed (HOB) Positioning: hospitals elevated  Pressure Reduction Devices:   pressure-redistributing mattress utilized   positioning supports utilized  Skin Protection:    incontinence pads utilized   transparent dressing maintained   Goal Outcome Evaluation:            10/23/24- Pt remains AOX4. RA- 1L NC when sleeping. Afib. Pt tried to get out of bed many times throughout shift without assistance, reoriented well when explained alarms are on d/t pt history of falls. Up x1/2 to toilet. Plan of care ongoing.

## 2024-10-24 NOTE — CASE MANAGEMENT/SOCIAL WORK
Continued Stay Note  [facility]     Patient Name: Billie Yu  MRN: [id number]  Today's Date: 10/24/2024    Admit Date: 10/22/2024    Plan: SNF   Discharge Plan       Row Name 10/24/24 1153       Plan    Plan SNF    Patient/Family in Agreement with Plan yes    Plan Comments Spoke with patient and son.  Therapy recommends SNF at discharge.  Patient is agreeable and would like a referral to [facility].  Referral called to [facility].  CM will continue to follow.    Final Discharge Disposition Code 03 - skilled nursing facility (SNF)                   Discharge Codes    No documentation.                 Expected Discharge Date and Time       Expected Discharge Date Expected Discharge Time    Oct 25, 2024               Harriett Hillman RN

## 2024-10-24 NOTE — THERAPY EVALUATION
Patient Name: Billie Yu  : 1935    MRN: [id number]                              Today's Date: 10/24/2024       Admit Date: 10/22/2024    Visit Dx:     ICD-10-CM ICD-9-CM   1. Injury of head, initial encounter  S09.90XA 959.01   2. Facial hematoma, initial encounter  S00.83XA 920   3. Anticoagulated  Z79.01 V58.61   4. Neck pain  M54.2 723.1   5. Pulmonary infiltrates  R91.8 793.19   6. Abnormal chest CT  R93.89 793.2   7. At risk for falls  Z91.81 V15.88     Patient Active Problem List   Diagnosis    Syncope    PAF (paroxysmal atrial fibrillation)    Chronic anticoagulation    Narcissistic personality disorder    PTSD (post-traumatic stress disorder)    Memory impairment    Snoring    Hx of breast cancer    Other constipation    Fall    Traumatic hematoma of forehead    Generalized weakness    Hard of hearing    Prediabetes     Past Medical History:   Diagnosis Date    Atrial fibrillation     Cataract     Dementia     Difficulty walking     HL (hearing loss)     Hypertension     Memory loss     Osteopenia     Scoliosis      Past Surgical History:   Procedure Laterality Date    BLADDER AUGMENTATION      BREAST SURGERY      HIP FRACTURE SURGERY Left     HYSTERECTOMY      MASTECTOMY Right     WRIST ARTHROPLASTY Left       General Information       Row Name 10/24/24 1325          OT Time and Intention    Document Type evaluation  -     Mode of Treatment occupational therapy  -       Row Name 10/24/24 1325          General Information    Patient Profile Reviewed yes  -     Prior Level of Function independent:;bed mobility;transfer;all household mobility;ADL's  pt reports she uses rollator PRN at baseline  -     Existing Precautions/Restrictions fall;other (see comments)  Large hematoma on R forehead, B hand abrasions d/t fall at home  -     Barriers to Rehab medically complex;previous functional deficit;cognitive status  -       Row Name 10/24/24 1325          Living Environment     People in Home alone  -       Row Name 10/24/24 1325          Home Main Entrance    Number of Stairs, Main Entrance three  -     Stair Railings, Main Entrance none  -       Row Name 10/24/24 1325          Stairs Within Home, Primary    Number of Stairs, Within Home, Primary none  -       Row Name 10/24/24 1325          Cognition    Orientation Status (Cognition) oriented x 3  -       Row Name 10/24/24 1325          Safety Issues/Impairments Affecting Functional Mobility    Safety Issues Affecting Function (Mobility) awareness of need for assistance;insight into deficits/self-awareness;safety precaution awareness;safety precautions follow-through/compliance;sequencing abilities  -     Impairments Affecting Function (Mobility) balance;cognition;endurance/activity tolerance;strength  -     Cognitive Impairments, Mobility Safety/Performance awareness, need for assistance;insight into deficits/self-awareness;safety precaution awareness;safety precaution follow-through;sequencing abilities  -               User Key  (r) = Recorded By, (t) = Taken By, (c) = Cosigned By      Initials Name Provider Type     Maggie Stallworth OT Occupational Therapist                     Mobility/ADL's       Row Name 10/24/24 1336          Bed Mobility    Comment, (Bed Mobility) Pt received and left sitting [facility]  -       Row Name 10/24/24 1336          Transfers    Transfers sit-stand transfer;stand-sit transfer  -       Row Name 10/24/24 1336          Sit-Stand Transfer    Sit-Stand [place] (Transfers) contact guard;verbal cues  -     Assistive Device (Sit-Stand Transfers) walker, front-wheeled  -       Row Name 10/24/24 1336          Stand-Sit Transfer    Stand-Sit [place] (Transfers) contact guard;verbal cues  -     Assistive Device (Stand-Sit Transfers) walker, front-wheeled  -       Row Name 10/24/24 1336          Activities of Daily Living    BADL Assessment/Intervention lower body  dressing;feeding  -       Row Name 10/24/24 1336          Lower Body Dressing Assessment/Training    [place] Level (Lower Body Dressing) don;socks;dependent (less than 25% patient effort)  -     Comment, (Lower Body Dressing) Pt typically wears slip on shoes  -       Row Name 10/24/24 1336          Self-Feeding Assessment/Training    [place] Level (Feeding) prepare tray/open items;moderate assist (50% patient effort);scoop food and bring to mouth;liquids to mouth;set up  -     Position (Feeding) supported sitting  -               User Key  (r) = Recorded By, (t) = Taken By, (c) = Cosigned By      Initials Name Provider Type     Maggie Stallworth OT Occupational Therapist                   Obj/Interventions       [facility] Name 10/24/24 1337          Sensory Assessment (Somatosensory)    Sensory Assessment (Somatosensory) UE sensation intact  -[facility] Name 10/24/24 1337          Vision Assessment/Intervention    Visual Impairment/Limitations WFL  -[facility] Name 10/24/24 1337          Range of Motion Comprehensive    General Range of Motion bilateral upper extremity ROM WFL  -[facility] Name 10/24/24 1337          Strength Comprehensive (MMT)    General Manual Muscle Testing (MMT) Assessment upper extremity strength deficits identified  -     Comment, General Manual Muscle Testing (MMT) Assessment BUE grossly 4-/5  -[facility] Name 10/24/24 1337          Balance    Balance Assessment sitting static balance;sitting dynamic balance;sit to stand dynamic balance;standing static balance  -     Static Sitting Balance standby assist  -     Dynamic Sitting Balance contact guard  -     Position, Sitting Balance unsupported;sitting in chair  -     Sit to Stand Dynamic Balance contact guard;verbal cues  -     Static Standing Balance contact guard;verbal cues  -     Position/Device Used, Standing Balance supported;walker, rolling  -     Balance Interventions sitting;sit to  stand;occupation based/functional task  -               User Key  (r) = Recorded By, (t) = Taken By, (c) = Cosigned By      Initials Name Provider Type    Maggie Singleton OT Occupational Therapist                   Goals/Plan       Row Name 10/24/24 1340          Transfer Goal 1 (OT)    Activity/Assistive Device (Transfer Goal 1, OT) bed-to-chair/chair-to-bed;toilet;walker, rolling  -     [place] Level/Cues Needed (Transfer Goal 1, OT) standby assist  -     Time Frame (Transfer Goal 1, OT) long term goal (LTG);10 days  -     Progress/Outcome (Transfer Goal 1, OT) goal ongoing  -       Row Name 10/24/24 1340          Dressing Goal 1 (OT)    Activity/Device (Dressing Goal 1, OT) lower body dressing  w/ AE PRN  -     [place]/Cues Needed (Dressing Goal 1, OT) minimum assist (75% or more patient effort)  -     Time Frame (Dressing Goal 1, OT) long term goal (LTG);10 days  -     Progress/Outcome (Dressing Goal 1, OT) goal ongoing  -       Row Name 10/24/24 1340          Toileting Goal 1 (OT)    Activity/Device (Toileting Goal 1, OT) adjust/manage clothing;perform perineal hygiene;commode;grab bar/safety frame  -     [place] Level/Cues Needed (Toileting Goal 1, OT) contact guard required  -     Time Frame (Toileting Goal 1, OT) short term goal (STG);5 days  -     Progress/Outcome (Toileting Goal 1, OT) goal ongoing  -       Row Name 10/24/24 1340          Grooming Goal 1 (OT)    Activity/Device (Grooming Goal 1, OT) hair care;oral care;wash face, hands  standing sinkside  -     [place] (Grooming Goal 1, OT) contact guard required  -     Time Frame (Grooming Goal 1, OT) long term goal (LTG);10 days  -     Progress/Outcome (Grooming Goal 1, OT) goal ongoing  -       Row Name 10/24/24 1340          Therapy Assessment/Plan (OT)    Planned Therapy Interventions (OT) activity tolerance training;adaptive equipment training;BADL retraining;functional balance  retraining;IADL retraining;occupation/activity based interventions;patient/caregiver education/training;ROM/therapeutic exercise;strengthening exercise;transfer/mobility retraining  -               User Key  (r) = Recorded By, (t) = Taken By, (c) = Cosigned By      Initials Name Provider Type     Maggie Stallworth, YOANNA Occupational Therapist                   Clinical Impression       Row Name 10/24/24 1335          Pain Assessment    Pretreatment Pain Rating 0/10 - no pain  -     Posttreatment Pain Rating 0/10 - no pain  -       Row Name 10/24/24 1333          Plan of Care Review    Plan of Care Reviewed With patient  -     Outcome Evaluation Pt presents w/ impaired cognition, generalized weakness, decreased functional endurance, and balance deficits. Pt would benefit from continued skilled IPOT services to address current functional deficits and facilitate return to PLOF. Rec IRF at d/c.  -       Row Name 10/24/24 1337          Therapy Assessment/Plan (OT)    Patient/Family Therapy Goal Statement (OT) Return to PLOF.  -     Rehab Potential (OT) good  -     Criteria for Skilled Therapeutic Interventions Met (OT) yes;skilled treatment is necessary  -     Therapy Frequency (OT) daily  -     Predicted Duration of Therapy Intervention (OT) 10 days  -       Row Name 10/24/24 1336          Therapy Plan Review/Discharge Plan (OT)    Anticipated Discharge Disposition (OT) inpatient rehabilitation facility  -       Row Name 10/24/24 1339          Vital Signs    O2 Delivery Pre Treatment room air  -     O2 Delivery Intra Treatment room air  -     O2 Delivery Post Treatment room air  -     Pre Patient Position Sitting  -     Intra Patient Position Standing  -     Post Patient Position Sitting  -       Row Name 10/24/24 1331          Positioning and Restraints    Pre-Treatment Position sitting in chair/recliner  -     Post Treatment Position chair  -     In Chair reclined;call light  within reach;encouraged to call for assist;exit alarm on;waffle cushion;legs elevated  -               User Key  (r) = Recorded By, (t) = Taken By, (c) = Cosigned By      Initials Name Provider Type    Maggie Singleton OT Occupational Therapist                   Outcome Measures       Row Name 10/24/24 1343          How much help from another is currently needed...    Putting on and taking off regular lower body clothing? 2  -MC     Bathing (including washing, rinsing, and drying) 2  -MC     Toileting (which includes using toilet bed pan or urinal) 2  -MC     Putting on and taking off regular upper body clothing 3  -MC     Taking care of personal grooming (such as brushing teeth) 3  -MC     Eating meals 3  -MC     AM-PAC 6 Clicks Score (OT) 15  -       Row Name 10/24/24 0800          How much help from another person do you currently need...    Turning from your back to your side while in flat bed without using bedrails? 3  -HJ     Moving from lying on back to sitting on the side of a flat bed without bedrails? 3  -HJ     Moving to and from a bed to a chair (including a wheelchair)? 3  -HJ     Standing up from a chair using your arms (e.g., wheelchair, bedside chair)? 3  -HJ     Climbing 3-5 steps with a railing? 3  -HJ     To walk in hospital room? 3  -HJ     AM-PAC 6 Clicks Score (PT) 18  -HJ     Highest Level of Mobility Goal 6 --> Walk 10 steps or more  -       Row Name 10/24/24 1343          Functional Assessment    Outcome Measure Options AM-PAC 6 Clicks Daily Activity (OT)  -               User Key  (r) = Recorded By, (t) = Taken By, (c) = Cosigned By      Initials Name Provider Type    Dana Mcleod RN Registered Nurse    Maggie Singleton OT Occupational Therapist                    Occupational Therapy Education       Title: PT OT SLP Therapies (In Progress)       Topic: Occupational Therapy (In Progress)       Point: ADL training (In Progress)       Description:   Instruct  learner(s) on proper safety adaptation and remediation techniques during self care or transfers.   Instruct in proper use of assistive devices.                  Learning Progress Summary            Patient Acceptance, E, NR by  at 10/24/2024 1343                      Point: Home exercise program (Not Started)       Description:   Instruct learner(s) on appropriate technique for monitoring, assisting and/or progressing therapeutic exercises/activities.                  Learner Progress:  Not documented in this visit.              Point: Precautions (In Progress)       Description:   Instruct learner(s) on prescribed precautions during self-care and functional transfers.                  Learning Progress Summary            Patient Acceptance, E, NR by  at 10/24/2024 1343                      Point: Body mechanics (In Progress)       Description:   Instruct learner(s) on proper positioning and spine alignment during self-care, functional mobility activities and/or exercises.                  Learning Progress Summary            Patient Acceptance, E, NR by  at 10/24/2024 1343                                      User Key       Initials Effective Dates Name Provider Type Discipline     10/14/22 -  Maggie Stallworth OT Occupational Therapist OT                  OT Recommendation and Plan  Planned Therapy Interventions (OT): activity tolerance training, adaptive equipment training, BADL retraining, functional balance retraining, IADL retraining, occupation/activity based interventions, patient/caregiver education/training, ROM/therapeutic exercise, strengthening exercise, transfer/mobility retraining  Therapy Frequency (OT): daily  Plan of Care Review  Plan of Care Reviewed With: patient  Outcome Evaluation: Pt presents w/ impaired cognition, generalized weakness, decreased functional endurance, and balance deficits. Pt would benefit from continued skilled IPOT services to address current functional deficits  and facilitate return to PLOF. Rec IRF at d/c.     Time Calculation:   Evaluation Complexity (OT)  Review Occupational Profile/Medical/Therapy History Complexity: expanded/moderate complexity  Assessment, Occupational Performance/Identification of Deficit Complexity: 3-5 performance deficits  Clinical Decision Making Complexity (OT): detailed assessment/moderate complexity  Overall Complexity of Evaluation (OT): moderate complexity     Time Calculation- OT       Row Name 10/24/24 1344             Time Calculation- OT    OT Start Time 1307  -MC      OT Received On 10/24/24  -      OT Goal Re-Cert Due Date 11/03/24  -         Timed Charges    63412 - OT Therapeutic Activity Minutes 3  -MC      74687 - OT Self Care/Mgmt Minutes 6  -MC         Untimed Charges    OT Eval/Re-eval Minutes 31  -MC         Total Minutes    Timed Charges Total Minutes 9  -MC      Untimed Charges Total Minutes 31  -MC       Total Minutes 40  -MC                User Key  (r) = Recorded By, (t) = Taken By, (c) = Cosigned By      Initials Name Provider Type     Maggie Stallworth, OT Occupational Therapist                  Therapy Charges for Today       Code Description Service Date Service Provider Modifiers Qty    [id number]  OT SELF CARE/MGMT/TRAIN EA 15 MIN 10/24/2024 Maggie Stallworth, OT GO 1    [id number]  OT EVAL MOD COMPLEXITY 3 10/24/2024 Maggie Stallworth OT GO 1                 Maggie Stallworth OT  10/24/2024

## 2024-10-24 NOTE — PROGRESS NOTES
[facility] Medicine Services  PROGRESS NOTE    Patient Name: Billie Yu  : 1935  MRN: [id number]    Date of Admission: 10/22/2024  Primary Care Physician: Elvi Ventura MD    Subjective   Subjective     CC:  Mechanical fall at home    HPI:  Patient reporting some forehead soreness from bruising and fall. Otherwise no complaints. Awaiting rehab.      Objective   Objective     Vital Signs:   Temp:  [97.7 °F (36.5 °C)-99.4 °F (37.4 °C)] 97.7 °F (36.5 °C)  Heart Rate:  [62-74] 72  Resp:  [16-20] 16  BP: (106-170)/(54-85) 135/62  Flow (L/min) (Oxygen Therapy):  [1] 1     Physical Exam:  Constitutional: Awake, alert, resting comfortably  HENT: Ecchymosis of right eyelid and periorbital area  Respiratory: Clear to auscultation bilaterally, respiratory effort normal   Cardiovascular: RRR, no murmurs, rubs, or gallops  Gastrointestinal: soft, nontender, nondistended  Musculoskeletal: No bilateral ankle edema  Psychiatric: Appropriate affect, cooperative  Neurologic: Alert, oriented x 3, no focal deficits, speech clear  Skin: Anterior hand abrasions covered with bandage, ecchymosis of right eyelid, periorbital area and forehead      Results Reviewed:  LAB RESULTS:      Lab 10/23/24  0903 10/22/24  1020   WBC 7.97 9.46   HEMOGLOBIN 13.4 13.8   HEMATOCRIT 40.2 42.4   PLATELETS 209 209   NEUTROS ABS 6.59  --    IMMATURE GRANS (ABS) 0.02  --    LYMPHS ABS 0.57*  --    MONOS ABS 0.73  --    EOS ABS 0.04  --    MCV 94.4 94.4         Lab 10/23/24  0903 10/22/24  1020   SODIUM 134* 135*   POTASSIUM 4.3 4.4   CHLORIDE 100 97*   CO2 25.0 28.3   ANION GAP 9.0 9.7   BUN 23 25*   CREATININE 0.55* 0.72   EGFR 89.4 81.5   GLUCOSE 113* 110*   CALCIUM 9.1 9.7   HEMOGLOBIN A1C  --  5.70*   TSH  --  1.380         Lab 10/23/24  0903 10/22/24  1020   TOTAL PROTEIN 6.7 7.1   ALBUMIN 3.7 4.1   GLOBULIN 3.0 3.0   ALT (SGPT) 16 20   AST (SGOT) 20 21   BILIRUBIN 0.5 0.5   ALK PHOS 69 68             Lab  10/22/24  1020   CHOLESTEROL 186   LDL CHOL 90   HDL CHOL 84*   TRIGLYCERIDES 66             Brief Urine Lab Results  (Last result in the past 365 days)        Color   Clarity   Blood   Leuk Est   Nitrite   Protein   CREAT   Urine HCG        10/22/24 2004 Yellow   Clear   Large (3+)   Trace   Negative   30 mg/dL (1+)                   Microbiology Results Abnormal       None            CT Chest Without Contrast Diagnostic    Result Date: 10/22/2024  CT CHEST WO CONTRAST DIAGNOSTIC Date of Exam: 10/22/2024 6:28 PM EDT Indication: Left apical abnormality evaluation seen on cervical CT. Comparison: Same day cervical spine CT Technique: Axial CT images were obtained of the chest without contrast administration.  Reconstructed coronal and sagittal images were also obtained. Automated exposure control and iterative construction methods were used. Findings: Lower Neck: Unremarkable. Hilum and Mediastinum: Moderate-sized hiatal/paraesophageal hernia. Possible mild wall thickening of the distal esophagus. Mild enlargement of the central pulmonary arteries, nonspecific but may represent pulmonary arterial hypertension. Vascular calcifications of the thoracic aorta. Otherwise the visualized vasculature are unremarkable. Normal size heart without pericardial effusion. No definite lymphadenopathy Lung Parenchyma and Pleura: No pleural effusion or pneumothorax. Multiple scattered focal patchy opacities noted within the lungs bilaterally, predominantly within the upper lobes and left lower lobe.. Within these regions, there are also multiple areas of nodular opacity, the largest of which measures 1.2 cm in the left upper lobe. No consolidation. The central airways are patent. Upper Abdomen: No acute process. Soft tissues: Patient is status post right mastectomy. Otherwise unremarkable Osseous structures: No definite acute osseous abnormality. Severe S-shaped scoliosis. Mild to moderate compression deformities of the T8 and T9  vertebral bodies, most likely chronic.     Impression: Impression: Areas of patchy opacities and nodular changes noted within the lungs bilaterally. This is a nonspecific finding but most likely represents a chronic infectious/inflammatory process. Recommend follow-up chest CT in 3 months after resolution of symptoms to  exclude underlying pulmonary lesion/mass. Age indeterminant mild to moderate compression deformities of the T8 and T9 vertebral bodies. These are most likely chronic. Please correlate with point tenderness. Moderate hiatal/paraesophageal hernia. There is apparent wall thickening of the distal esophagus, which may represent esophagitis. If there is clinical concern for an underlying lesion, please consider follow-up with dedicated outpatient endoscopic evaluation. Electronically Signed: Bandar Monge DO  10/22/2024 6:53 PM EDT  Workstation ID: [id number]    CT Head Without Contrast    Result Date: 10/22/2024  CT HEAD WO CONTRAST, CT CERVICAL SPINE WO CONTRAST Date of Exam: 10/22/2024 5:47 PM EDT Indication: Head injury on blood thinners. Comparison: Head CT 6/19/2024 Technique: Axial CT images were obtained of the head and cervical spine without contrast administration.  Automated exposure control and iterative construction methods were used. Findings: CT HEAD: There is no evidence of acute infarction. There is no acute intracranial hemorrhage. There are no extra-axial collections. Ventricles and CSF spaces are symmetrically prominent. No mass effect nor hydrocephalus. Brain parenchyma appears unchanged.  Paranasal sinuses and mastoid air cells are adequately aerated.  Osseous structures and orbits appear intact. There is a right forehead hematoma. CT CERVICAL SPINE: OSSEOUS STRUCTURES: No fracture nor bony destructive process. ALIGNMENT:  Normal. There is right convex curvature of the mid cervical spine. DISC SPACE: There is moderate intervertebral disc space narrowing and endplate osteophyte  formation along the inner margin of the cervical curvature. JOINTS: Advanced facet arthropathy with more mild uncovertebral hypertrophy. There is multilevel facet fusion. SOFT TISSUES: There is a 2.3 cm lower pole right thyroid nodule. There is a 2 cm lower pole left thyroid nodule. No follow-up suggested given patient's age. There is moderate to advanced carotid atherosclerotic disease. LUNG APICES: There are few irregular nodular opacities in the left lung apex potentially infectious. However, neoplasm not excluded. Nonemergent follow-up noncontrast chest CT recommended. LEVELS: No significant osseous central canal nor foraminal stenosis identified at any level.     Impression: Impression: 1.Right forehead hematoma. No acute intracranial process is identified. 2.Cervical degenerative changes without acute injury identified. 3.Left apical pulmonary opacities potentially infectious. Nonemergent follow-up noncontrast chest CT recommended. Electronically Signed: Osei Cardona MD  10/22/2024 6:03 PM EDT  Workstation ID: [id number]    CT Cervical Spine Without Contrast    Result Date: 10/22/2024  CT HEAD WO CONTRAST, CT CERVICAL SPINE WO CONTRAST Date of Exam: 10/22/2024 5:47 PM EDT Indication: Head injury on blood thinners. Comparison: Head CT 6/19/2024 Technique: Axial CT images were obtained of the head and cervical spine without contrast administration.  Automated exposure control and iterative construction methods were used. Findings: CT HEAD: There is no evidence of acute infarction. There is no acute intracranial hemorrhage. There are no extra-axial collections. Ventricles and CSF spaces are symmetrically prominent. No mass effect nor hydrocephalus. Brain parenchyma appears unchanged.  Paranasal sinuses and mastoid air cells are adequately aerated.  Osseous structures and orbits appear intact. There is a right forehead hematoma. CT CERVICAL SPINE: OSSEOUS STRUCTURES: No fracture nor bony destructive process.  ALIGNMENT:  Normal. There is right convex curvature of the mid cervical spine. DISC SPACE: There is moderate intervertebral disc space narrowing and endplate osteophyte formation along the inner margin of the cervical curvature. JOINTS: Advanced facet arthropathy with more mild uncovertebral hypertrophy. There is multilevel facet fusion. SOFT TISSUES: There is a 2.3 cm lower pole right thyroid nodule. There is a 2 cm lower pole left thyroid nodule. No follow-up suggested given patient's age. There is moderate to advanced carotid atherosclerotic disease. LUNG APICES: There are few irregular nodular opacities in the left lung apex potentially infectious. However, neoplasm not excluded. Nonemergent follow-up noncontrast chest CT recommended. LEVELS: No significant osseous central canal nor foraminal stenosis identified at any level.     Impression: Impression: 1.Right forehead hematoma. No acute intracranial process is identified. 2.Cervical degenerative changes without acute injury identified. 3.Left apical pulmonary opacities potentially infectious. Nonemergent follow-up noncontrast chest CT recommended. Electronically Signed: Osei Cardona MD  10/22/2024 6:03 PM EDT  Workstation ID: [id number]     Results for orders placed during the hospital encounter of 06/19/24    Adult Transthoracic Echo Complete With Contrast if Necessary Per Protocol    Interpretation Summary    Left ventricular ejection fraction appears to be in the lower limits of normal    Left atrial volume is moderately increased.    The right atrial cavity is borderline dilated.    Moderate aortic valve regurgitation is present.    Mild aortic valve stenosis is present. Aortic valve area is 1.2 cm2.    Peak velocity of the flow distal to the aortic valve is 277 cm/s. Aortic valve maximum pressure gradient is 31 mmHg. Aortic valve mean pressure gradient is 17 mmHg. Aortic valve dimensionless index is 0.39 .    Estimated right ventricular systolic pressure  from tricuspid regurgitation is mildly elevated (35-45 mmHg).      Current medications:  Scheduled Meds:acetaminophen, 1,000 mg, Oral, TID  amiodarone, 200 mg, Oral, Daily  apixaban, 2.5 mg, Oral, Q12H  donepezil, 10 mg, Oral, Nightly  lisinopril, 10 mg, Oral, Daily  sodium chloride, 10 mL, Intravenous, Q12H  spironolactone, 25 mg, Oral, Daily      Continuous Infusions:   PRN Meds:.  acetaminophen    senna-docusate sodium **AND** polyethylene glycol **AND** bisacodyl **AND** bisacodyl    Calcium Replacement - Follow Nurse / BPA Driven Protocol    guaifenesin    Magnesium Standard Dose Replacement - Follow Nurse / BPA Driven Protocol    melatonin    Phosphorus Replacement - Follow Nurse / BPA Driven Protocol    Potassium Replacement - Follow Nurse / BPA Driven Protocol    sodium chloride    Tetanus-Diphth-Acell Pertussis    Assessment & Plan   Assessment & Plan     Active Hospital Problems    Diagnosis  POA    **Fall [W19.XXXA]  Yes    Traumatic hematoma of forehead [S00.83XA]  Unknown    Generalized weakness [R53.1]  Unknown    Hard of hearing [H91.90]  Unknown    PAF (paroxysmal atrial fibrillation) [I48.0]  Yes    Memory impairment [R41.3]  Yes      Resolved Hospital Problems   No resolved problems to display.        Brief Hospital Course to date:  Billie Yu is a 88 y.o. female with significant medical history for paroxysmal atrial fibrillation, chronic anticoagulation, syncope, presents to [facility] after a fall at home sustaining a head injury.     Mechanical fall with head injury  Generalized weakness  -CT head showed right forehead hematoma, otherwise nonacute  -PT/OT evaluated, recommended IPR on discharge. Case management arranging.    Hiatal / paraesophageal hernia  -CT chest showed wall thickening of distal esophagus, ?may represent esophagitis.    -Consider outpatient endoscopic evaluation if any symptoms occur.     Nodular changes bilateral lungs  -CT chest showed patchy opacities and  nodular changes within lungs bilaterally, may represent chronic infectious/inflammatory process.  -Recommend follow-up chest CT in 3 months to exclude underlying pulmonary lesion/mass.     HTN- continue lisinopril, spironolactone.  Atrial fibrillation- continue amiodarone, Eliquis.  Dementia- continue donepezil.  Hard of hearing- Patient wears hearing aids, not present on admission.      Expected Discharge Location and Transportation: [facility]  Expected Discharge   Expected Discharge Date: 10/25/2024; Expected Discharge Time:      VTE Prophylaxis:  Pharmacologic & mechanical VTE prophylaxis orders are present.         AM-PAC 6 Clicks Score (PT): 18 (10/24/24 0800)    CODE STATUS:   Code Status and Medical Interventions: CPR (Attempt to Resuscitate); Full Support   Ordered at: 10/22/24 2043     Level Of Support Discussed With:    Patient     Code Status (Patient has no pulse and is not breathing):    CPR (Attempt to Resuscitate)     Medical Interventions (Patient has pulse or is breathing):    Full Support       Leslie Rojas, DO  10/24/24

## 2024-10-24 NOTE — PLAN OF CARE
Goal Outcome Evaluation:  Plan of Care Reviewed With: patient           Outcome Evaluation: Pt presents w/ impaired cognition, generalized weakness, decreased functional endurance, and balance deficits. Pt would benefit from continued skilled IPOT services to address current functional deficits and facilitate return to PLOF. Rec IRF at d/c.    Anticipated Discharge Disposition (OT): inpatient rehabilitation facility

## 2024-10-25 LAB
B PARAPERT DNA SPEC QL NAA+PROBE: NOT DETECTED
B PERT DNA SPEC QL NAA+PROBE: NOT DETECTED
BACTERIA SPEC RESP CULT: NORMAL
C PNEUM DNA NPH QL NAA+NON-PROBE: NOT DETECTED
FLUAV SUBTYP SPEC NAA+PROBE: NOT DETECTED
FLUBV RNA ISLT QL NAA+PROBE: NOT DETECTED
GRAM STN SPEC: NORMAL
HADV DNA SPEC NAA+PROBE: NOT DETECTED
HCOV 229E RNA SPEC QL NAA+PROBE: NOT DETECTED
HCOV HKU1 RNA SPEC QL NAA+PROBE: NOT DETECTED
HCOV NL63 RNA SPEC QL NAA+PROBE: NOT DETECTED
HCOV OC43 RNA SPEC QL NAA+PROBE: NOT DETECTED
HMPV RNA NPH QL NAA+NON-PROBE: NOT DETECTED
HPIV1 RNA ISLT QL NAA+PROBE: NOT DETECTED
HPIV2 RNA SPEC QL NAA+PROBE: NOT DETECTED
HPIV3 RNA NPH QL NAA+PROBE: NOT DETECTED
HPIV4 P GENE NPH QL NAA+PROBE: NOT DETECTED
M PNEUMO IGG SER IA-ACNC: NOT DETECTED
RHINOVIRUS RNA SPEC NAA+PROBE: NOT DETECTED
RSV RNA NPH QL NAA+NON-PROBE: NOT DETECTED
SARS-COV-2 RNA NPH QL NAA+NON-PROBE: NOT DETECTED

## 2024-10-25 PROCEDURE — 87205 SMEAR GRAM STAIN: CPT | Performed by: STUDENT IN AN ORGANIZED HEALTH CARE EDUCATION/TRAINING PROGRAM

## 2024-10-25 PROCEDURE — 97116 GAIT TRAINING THERAPY: CPT

## 2024-10-25 PROCEDURE — G0378 HOSPITAL OBSERVATION PER HR: HCPCS

## 2024-10-25 PROCEDURE — 87070 CULTURE OTHR SPECIMN AEROBIC: CPT | Performed by: STUDENT IN AN ORGANIZED HEALTH CARE EDUCATION/TRAINING PROGRAM

## 2024-10-25 PROCEDURE — 97530 THERAPEUTIC ACTIVITIES: CPT

## 2024-10-25 PROCEDURE — 0202U NFCT DS 22 TRGT SARS-COV-2: CPT | Performed by: STUDENT IN AN ORGANIZED HEALTH CARE EDUCATION/TRAINING PROGRAM

## 2024-10-25 PROCEDURE — 99232 SBSQ HOSP IP/OBS MODERATE 35: CPT | Performed by: STUDENT IN AN ORGANIZED HEALTH CARE EDUCATION/TRAINING PROGRAM

## 2024-10-25 RX ORDER — CEFUROXIME AXETIL 250 MG/1
500 TABLET ORAL EVERY 12 HOURS SCHEDULED
Status: DISCONTINUED | OUTPATIENT
Start: 2024-10-25 | End: 2024-10-28 | Stop reason: HOSPADM

## 2024-10-25 RX ORDER — GUAIFENESIN 600 MG/1
600 TABLET, EXTENDED RELEASE ORAL EVERY 12 HOURS SCHEDULED
Status: DISCONTINUED | OUTPATIENT
Start: 2024-10-25 | End: 2024-10-25

## 2024-10-25 RX ORDER — GUAIFENESIN 200 MG/10ML
200 LIQUID ORAL EVERY 4 HOURS PRN
Status: DISCONTINUED | OUTPATIENT
Start: 2024-10-25 | End: 2024-10-28 | Stop reason: HOSPADM

## 2024-10-25 RX ORDER — DOXYCYCLINE 100 MG/1
100 CAPSULE ORAL EVERY 12 HOURS SCHEDULED
Status: DISCONTINUED | OUTPATIENT
Start: 2024-10-25 | End: 2024-10-28 | Stop reason: HOSPADM

## 2024-10-25 RX ADMIN — Medication 10 ML: at 08:26

## 2024-10-25 RX ADMIN — SPIRONOLACTONE 25 MG: 25 TABLET ORAL at 08:25

## 2024-10-25 RX ADMIN — APIXABAN 2.5 MG: 2.5 TABLET, FILM COATED ORAL at 08:25

## 2024-10-25 RX ADMIN — CEFUROXIME AXETIL 500 MG: 250 TABLET, FILM COATED ORAL at 20:55

## 2024-10-25 RX ADMIN — Medication 5 MG: at 20:57

## 2024-10-25 RX ADMIN — AMIODARONE HYDROCHLORIDE 200 MG: 200 TABLET ORAL at 08:25

## 2024-10-25 RX ADMIN — GUAIFENESIN 600 MG: 600 TABLET, EXTENDED RELEASE ORAL at 20:57

## 2024-10-25 RX ADMIN — DOXYCYCLINE 100 MG: 100 CAPSULE ORAL at 20:56

## 2024-10-25 RX ADMIN — LISINOPRIL 10 MG: 10 TABLET ORAL at 08:25

## 2024-10-25 RX ADMIN — GUAIFENESIN 600 MG: 600 TABLET, EXTENDED RELEASE ORAL at 12:37

## 2024-10-25 RX ADMIN — GUAIFENESIN 200 MG: 200 SOLUTION ORAL at 23:34

## 2024-10-25 RX ADMIN — DONEPEZIL HYDROCHLORIDE 10 MG: 10 TABLET, FILM COATED ORAL at 20:56

## 2024-10-25 RX ADMIN — Medication 10 ML: at 20:58

## 2024-10-25 RX ADMIN — APIXABAN 2.5 MG: 2.5 TABLET, FILM COATED ORAL at 20:57

## 2024-10-25 NOTE — PROGRESS NOTES
[facility] Medicine Services  PROGRESS NOTE    Patient Name: Billie Yu  : 1935  MRN: [id number]    Date of Admission: 10/22/2024  Primary Care Physician: Elvi Ventura MD    Subjective   Subjective     CC:  Mechanical fall at home    HPI:  Patient with cough and congestion, raspy voice, reports cough a tiny bit better today.       Objective   Objective     Vital Signs:   Temp:  [97.5 °F (36.4 °C)-98.1 °F (36.7 °C)] 98 °F (36.7 °C)  Heart Rate:  [59-80] 80  Resp:  [16-18] 18  BP: (131-187)/(53-82) 147/79  Flow (L/min) (Oxygen Therapy):  [1] 1     Physical Exam:  Constitutional: Awake, alert, resting comfortably  HENT: Ecchymosis of right eyelid and periorbital area, voice hoarseness  Respiratory: Clear to auscultation bilaterally, respiratory effort normal, coughing throughout exam   Cardiovascular: RRR, no murmurs, rubs, or gallops  Gastrointestinal: soft, nontender, nondistended  Musculoskeletal: No bilateral ankle edema  Neurologic: Alert, oriented x 3, no focal deficits, speech clear  Skin: Anterior hand abrasions covered with bandage, ecchymosis of right eyelid, periorbital area and forehead      Results Reviewed:  LAB RESULTS:      Lab 10/23/24  0903 10/22/24  1020   WBC 7.97 9.46   HEMOGLOBIN 13.4 13.8   HEMATOCRIT 40.2 42.4   PLATELETS 209 209   NEUTROS ABS 6.59  --    IMMATURE GRANS (ABS) 0.02  --    LYMPHS ABS 0.57*  --    MONOS ABS 0.73  --    EOS ABS 0.04  --    MCV 94.4 94.4         Lab 10/23/24  0903 10/22/24  1020   SODIUM 134* 135*   POTASSIUM 4.3 4.4   CHLORIDE 100 97*   CO2 25.0 28.3   ANION GAP 9.0 9.7   BUN 23 25*   CREATININE 0.55* 0.72   EGFR 89.4 81.5   GLUCOSE 113* 110*   CALCIUM 9.1 9.7   HEMOGLOBIN A1C  --  5.70*   TSH  --  1.380         Lab 10/23/24  0903 10/22/24  1020   TOTAL PROTEIN 6.7 7.1   ALBUMIN 3.7 4.1   GLOBULIN 3.0 3.0   ALT (SGPT) 16 20   AST (SGOT) 20 21   BILIRUBIN 0.5 0.5   ALK PHOS 69 68             Lab 10/22/24  1020   CHOLESTEROL  186   LDL CHOL 90   HDL CHOL 84*   TRIGLYCERIDES 66             Brief Urine Lab Results  (Last result in the past 365 days)        Color   Clarity   Blood   Leuk Est   Nitrite   Protein   CREAT   Urine HCG        10/22/24 2004 Yellow   Clear   Large (3+)   Trace   Negative   30 mg/dL (1+)                   Microbiology Results Abnormal       Procedure Component Value - Date/Time    Respiratory Culture - Sputum, Cough [[id number]] Collected: 10/25/24 1337    Lab Status: Final result Specimen: Sputum from Cough Updated: 10/25/24 1437     Respiratory Culture Rejected     Gram Stain No WBCs per low power field      No Epithelial cells per low power field      Moderate (3+) Gram positive cocci    Narrative:      Specimen rejected due to oropharyngeal contamination. Please reorder and recollect specimen if clinically necessary.    Respiratory Panel PCR w/COVID-19(SARS-CoV-2) ANN/BEBO/ELKE/PAD/COR/PRABHAKAR In-House, NP Swab in UTM/VTM, 2 HR TAT - Swab, Nasopharynx [[id number]]  (Normal) Collected: 10/25/24 1337    Lab Status: Final result Specimen: Swab from Nasopharynx Updated: 10/25/24 1435     ADENOVIRUS, PCR Not Detected     Coronavirus 229E Not Detected     Coronavirus HKU1 Not Detected     Coronavirus NL63 Not Detected     Coronavirus OC43 Not Detected     COVID19 Not Detected     Human Metapneumovirus Not Detected     Human Rhinovirus/Enterovirus Not Detected     Influenza A PCR Not Detected     Influenza B PCR Not Detected     Parainfluenza Virus 1 Not Detected     Parainfluenza Virus 2 Not Detected     Parainfluenza Virus 3 Not Detected     Parainfluenza Virus 4 Not Detected     RSV, PCR Not Detected     Bordetella pertussis pcr Not Detected     Bordetella parapertussis PCR Not Detected     Chlamydophila pneumoniae PCR Not Detected     Mycoplasma pneumo by PCR Not Detected    Narrative:      In the setting of a positive respiratory panel with a viral infection PLUS a negative procalcitonin without other underlying concern  for bacterial infection, consider observing off antibiotics or discontinuation of antibiotics and continue supportive care. If the respiratory panel is positive for atypical bacterial infection (Bordetella pertussis, Chlamydophila pneumoniae, or Mycoplasma pneumoniae), consider antibiotic de-escalation to target atypical bacterial infection.            No radiology results from the last 24 hrs    Results for orders placed during the hospital encounter of 06/19/24    Adult Transthoracic Echo Complete With Contrast if Necessary Per Protocol    Interpretation Summary    Left ventricular ejection fraction appears to be in the lower limits of normal    Left atrial volume is moderately increased.    The right atrial cavity is borderline dilated.    Moderate aortic valve regurgitation is present.    Mild aortic valve stenosis is present. Aortic valve area is 1.2 cm2.    Peak velocity of the flow distal to the aortic valve is 277 cm/s. Aortic valve maximum pressure gradient is 31 mmHg. Aortic valve mean pressure gradient is 17 mmHg. Aortic valve dimensionless index is 0.39 .    Estimated right ventricular systolic pressure from tricuspid regurgitation is mildly elevated (35-45 mmHg).      Current medications:  Scheduled Meds:amiodarone, 200 mg, Oral, Daily  apixaban, 2.5 mg, Oral, Q12H  cefuroxime, 500 mg, Oral, Q12H  donepezil, 10 mg, Oral, Nightly  guaiFENesin, 600 mg, Oral, Q12H  lisinopril, 10 mg, Oral, Daily  sodium chloride, 10 mL, Intravenous, Q12H  spironolactone, 25 mg, Oral, Daily      Continuous Infusions:   PRN Meds:.  acetaminophen    senna-docusate sodium **AND** polyethylene glycol **AND** bisacodyl **AND** bisacodyl    Calcium Replacement - Follow Nurse / BPA Driven Protocol    Magnesium Standard Dose Replacement - Follow Nurse / BPA Driven Protocol    melatonin    Phosphorus Replacement - Follow Nurse / BPA Driven Protocol    Potassium Replacement - Follow Nurse / BPA Driven Protocol    sodium chloride     Tetanus-Diphth-Acell Pertussis    Assessment & Plan   Assessment & Plan     Active Hospital Problems    Diagnosis  POA    **Fall [W19.XXXA]  Yes    Traumatic hematoma of forehead [S00.83XA]  Unknown    Generalized weakness [R53.1]  Unknown    Hard of hearing [H91.90]  Unknown    PAF (paroxysmal atrial fibrillation) [I48.0]  Yes    Memory impairment [R41.3]  Yes      Resolved Hospital Problems   No resolved problems to display.        Brief Hospital Course to date:  Billie Yu is a 88 y.o. female with significant medical history for paroxysmal atrial fibrillation, chronic anticoagulation, syncope who presented to [facility] after a fall at home sustaining a head injury.     Mechanical fall with head injury  Generalized weakness  -CT head showed right forehead hematoma, otherwise nonacute  -PT/OT evaluated, recommended IPR on discharge. Case management arranging.    Cough with congestion  -symptoms present prior to admission  -resp viral PCR negative, respiratory culture rejected  -Given continued cough and congestion, will empirically treat with cefuroxime and doxycycline for 5 days. Mucinex BID. Incentive spirometry.     Nodular changes bilateral lungs  -CT chest showed patchy opacities and nodular changes within lungs bilaterally, may represent chronic infectious/inflammatory process.  -Follow-up chest CT in 3 months to exclude underlying pulmonary lesion/mass. Discussed with son on 10/25.     Hiatal / paraesophageal hernia  -CT chest showed wall thickening of distal esophagus, ?may represent esophagitis.    -Consider outpatient endoscopic evaluation if any symptoms occur.     HTN- continue lisinopril, spironolactone.   Atrial fibrillation- continue amiodarone, Eliquis.  Dementia- continue donepezil.  Hard of hearing- Patient wears hearing aids, not present on admission.      Expected Discharge Location and Transportation: SNF, hopefully Monday   Expected Discharge   Expected Discharge Date:  10/26/2024; Expected Discharge Time:      VTE Prophylaxis:  Pharmacologic & mechanical VTE prophylaxis orders are present.         AM-PAC 6 Clicks Score (PT): 17 (10/25/24 1506)    CODE STATUS:   Code Status and Medical Interventions: CPR (Attempt to Resuscitate); Full Support   Ordered at: 10/22/24 2043     Level Of Support Discussed With:    Patient     Code Status (Patient has no pulse and is not breathing):    CPR (Attempt to Resuscitate)     Medical Interventions (Patient has pulse or is breathing):    Full Support       Leslie Rojas, DO  10/25/24

## 2024-10-25 NOTE — THERAPY TREATMENT NOTE
Patient Name: Billie Yu  : 1935    MRN: [id number]                              Today's Date: 10/25/2024       Admit Date: 10/22/2024    Visit Dx:     ICD-10-CM ICD-9-CM   1. Injury of head, initial encounter  S09.90XA 959.01   2. Facial hematoma, initial encounter  S00.83XA 920   3. Anticoagulated  Z79.01 V58.61   4. Neck pain  M54.2 723.1   5. Pulmonary infiltrates  R91.8 793.19   6. Abnormal chest CT  R93.89 793.2   7. At risk for falls  Z91.81 V15.88     Patient Active Problem List   Diagnosis    Syncope    PAF (paroxysmal atrial fibrillation)    Chronic anticoagulation    Narcissistic personality disorder    PTSD (post-traumatic stress disorder)    Memory impairment    Snoring    Hx of breast cancer    Other constipation    Fall    Traumatic hematoma of forehead    Generalized weakness    Hard of hearing    Prediabetes     Past Medical History:   Diagnosis Date    Atrial fibrillation     Cataract     Dementia     Difficulty walking     HL (hearing loss)     Hypertension     Memory loss     Osteopenia     Scoliosis      Past Surgical History:   Procedure Laterality Date    BLADDER AUGMENTATION      BREAST SURGERY      HIP FRACTURE SURGERY Left     HYSTERECTOMY      MASTECTOMY Right     WRIST ARTHROPLASTY Left       General Information       Row Name 10/25/24 1500          Physical Therapy Time and Intention    Document Type therapy note (daily note)  -AS     Mode of Treatment physical therapy  -AS       Row Name 10/25/24 1500          General Information    Patient Profile Reviewed yes  -AS     Existing Precautions/Restrictions fall;other (see comments)  B eye brusing, B hand abrasions d/t fall at home  -AS     Barriers to Rehab medically complex;previous functional deficit;cognitive status  -AS       Row Name 10/25/24 1500          Cognition    Orientation Status (Cognition) oriented x 3  -AS       Row Name 10/25/24 1500          Safety Issues/Impairments Affecting Functional Mobility     Safety Issues Affecting Function (Mobility) awareness of need for assistance;impulsivity;insight into deficits/self-awareness;positioning of assistive device;safety precautions follow-through/compliance;judgment;sequencing abilities  -AS     Impairments Affecting Function (Mobility) balance;cognition;endurance/activity tolerance;strength  -AS     Comment, Safety Issues/Impairments (Mobility) decreased safety awareness with all activity, easily distracted when ambulating in hallway  -AS               User Key  (r) = Recorded By, (t) = Taken By, (c) = Cosigned By      Initials Name Provider Type    AS Roslyn Lockhart PTA Physical Therapist Assistant                   Mobility       Row Name 10/25/24 1502          Bed Mobility    Comment, (Bed Mobility) [facility] pre/post tx  -AS       Row Name 10/25/24 1502          Transfers    Comment, (Transfers) cues for hand placement  -AS       Row Name 10/25/24 1502          Sit-Stand Transfer    Sit-Stand [place] (Transfers) verbal cues;contact guard;1 person assist  -AS     Assistive Device (Sit-Stand Transfers) walker, front-wheeled  -AS       Row Name 10/25/24 1502          Gait/Stairs (Locomotion)    [place] Level (Gait) verbal cues;contact guard;1 person assist;minimum assist (75% patient effort)  -AS     Assistive Device (Gait) walker, front-wheeled  -AS     Distance in Feet (Gait) 60  -AS     Deviations/Abnormal Patterns (Gait) chato decreased;festinating/shuffling;gait speed decreased;stride length decreased;bilateral deviations  -AS     Bilateral Gait Deviations forward flexed posture;heel strike decreased  -AS     Comment, (Gait/Stairs) patient ambulated 60' with CGA/Min assist x1 and rolling walker for support, verbal cues to stay close to walker and to increase step length bilaterally and to avoid shuffling at times. High fall risk d/t decreased safety awareness, Further mobility limited by weakness and fatigue.  -AS               User Key  (r) =  Recorded By, (t) = Taken By, (c) = Cosigned By      Initials Name Provider Type    AS Roslyn Lockhart PTA Physical Therapist Assistant                   Obj/Interventions       Row Name 10/25/24 150          Motor Skills    Therapeutic Exercise --  deferred d/t weakness  -AS       Row Name 10/25/24 1505          Balance    Dynamic Standing Balance verbal cues;contact guard;minimal assist;1-person assist  -AS     Position/Device Used, Standing Balance supported;walker, front-wheeled  -AS     Comment, Balance CGA/Min assist for safety, mild unsteadiness  -AS               User Key  (r) = Recorded By, (t) = Taken By, (c) = Cosigned By      Initials Name Provider Type    AS Roslyn Lockhart PTA Physical Therapist Assistant                   Goals/Plan    No documentation.                  Clinical Impression       Row Name 10/25/24 1506          Pain    Pretreatment Pain Rating 0/10 - no pain  -AS     Posttreatment Pain Rating 0/10 - no pain  -AS       Row Name 10/25/24 1509          Plan of Care Review    Plan of Care Reviewed With patient  -AS     Progress improving  -AS     Outcome Evaluation patient ambulated 60' with CGA/Min assist x1 and rolling walker for support, verbal cues to stay close to walker and to increase step length bilaterally and to avoid shuffling at times. Assist to control walker at times and to avoid obstacles in hallway. High fall risk d/t decreased safety awareness, Further mobility limited by weakness and fatigue. Recommend IRF at D/C for best functional outcome.  -AS       Row Name 10/25/24 1509          Positioning and Restraints    Pre-Treatment Position sitting in chair/recliner  -AS     Post Treatment Position chair  -AS     In Chair reclined;call light within reach;encouraged to call for assist;exit alarm on;waffle cushion;legs elevated  -AS               User Key  (r) = Recorded By, (t) = Taken By, (c) = Cosigned By      Initials Name Provider Type    AS Roslyn Lockhart,  PTA Physical Therapist Assistant                   Outcome Measures       Row Name 10/25/24 1506 10/25/24 0800       How much help from another person do you currently need...    Turning from your back to your side while in flat bed without using bedrails? 3  -AS 3  -HK    Moving from lying on back to sitting on the side of a flat bed without bedrails? 3  -AS 3  -HK    Moving to and from a bed to a chair (including a wheelchair)? 3  -AS 3  -HK    Standing up from a chair using your arms (e.g., wheelchair, bedside chair)? 3  -AS 3  -HK    Climbing 3-5 steps with a railing? 2  -AS 3  -HK    To walk in hospital room? 3  -AS 3  -HK    AM-PAC 6 Clicks Score (PT) 17  -AS 18  -HK    Highest Level of Mobility Goal 5 --> Static standing  -AS 6 --> Walk 10 steps or more  -HK      Row Name 10/25/24 1506          Functional Assessment    Outcome Measure Options AM-PAC 6 Clicks Basic Mobility (PT)  -AS               User Key  (r) = Recorded By, (t) = Taken By, (c) = Cosigned By      Initials Name Provider Type    AS Roslyn Lockhart PTA Physical Therapist Assistant    Zina Yung, RN Registered Nurse                                 Physical Therapy Education       Title: PT OT SLP Therapies (In Progress)       Topic: Physical Therapy (In Progress)       Point: Mobility training (In Progress)       Learning Progress Summary            Patient Acceptance, E, NR by AS at 10/25/2024 1506    Acceptance, E, NR by AC at 10/23/2024 1445                      Point: Home exercise program (In Progress)       Learning Progress Summary            Patient Acceptance, E, NR by AS at 10/25/2024 1506                      Point: Body mechanics (In Progress)       Learning Progress Summary            Patient Acceptance, E, NR by AS at 10/25/2024 1506    Acceptance, E, NR by AC at 10/23/2024 1445                      Point: Precautions (In Progress)       Learning Progress Summary            Patient Acceptance, E, NR by AS at  10/25/2024 1506    Acceptance, E, NR by AC at 10/23/2024 1445                                      User Key       Initials Effective Dates Name Provider Type Discipline    AS 04/28/23 -  Roslyn Lockhart PTA Physical Therapist Assistant PT    AC 07/11/24 -  Shaylee Gonzalez PT Physical Therapist PT                  PT Recommendation and Plan     Progress: improving  Outcome Evaluation: patient ambulated 60' with CGA/Min assist x1 and rolling walker for support, verbal cues to stay close to walker and to increase step length bilaterally and to avoid shuffling at times. Assist to control walker at times and to avoid obstacles in hallway. High fall risk d/t decreased safety awareness, Further mobility limited by weakness and fatigue. Recommend IRF at D/C for best functional outcome.     Time Calculation:         PT Charges       Row Name 10/25/24 1506             Time Calculation    Start Time 1425  -AS      PT Received On 10/25/24  -AS      PT Goal Re-Cert Due Date 11/02/24  -AS         Timed Charges    73391 - Gait Training Minutes  15  -AS      77983 - PT Therapeutic Activity Minutes 8  -AS         Total Minutes    Timed Charges Total Minutes 23  -AS       Total Minutes 23  -AS                User Key  (r) = Recorded By, (t) = Taken By, (c) = Cosigned By      Initials Name Provider Type    AS Roslyn Lockhart PTA Physical Therapist Assistant                  Therapy Charges for Today       Code Description Service Date Service Provider Modifiers Qty    45145112395 HC GAIT TRAINING EA 15 MIN 10/25/2024 Roslyn Lockhart PTA GP 1    79917182387 HC PT THERAPEUTIC ACT EA 15 MIN 10/25/2024 Roslyn Lockhart PTA GP 1            PT G-Codes  Outcome Measure Options: AM-PAC 6 Clicks Basic Mobility (PT)  AM-PAC 6 Clicks Score (PT): 17  AM-PAC 6 Clicks Score (OT): 15       Roslyn Lockhart PTA  10/25/2024

## 2024-10-25 NOTE — PLAN OF CARE
Problem: Adult Inpatient Plan of Care  Goal: Plan of Care Review  Outcome: Progressing  Goal: Patient-Specific Goal (Individualized)  Outcome: Progressing  Goal: Absence of Hospital-Acquired Illness or Injury  Outcome: Progressing  Intervention: Identify and Manage Fall Risk  Recent Flowsheet Documentation  Taken 10/25/2024 0400 by Sangeetha Garcia RN  Safety Promotion/Fall Prevention:   activity supervised   assistive device/personal items within reach   clutter free environment maintained   fall prevention program maintained   lighting adjusted   nonskid shoes/slippers when out of bed   room organization consistent   safety round/check completed  Taken 10/25/2024 0200 by Sangeetha Garcia RN  Safety Promotion/Fall Prevention:   activity supervised   assistive device/personal items within reach   clutter free environment maintained   fall prevention program maintained   lighting adjusted   nonskid shoes/slippers when out of bed   room organization consistent   safety round/check completed  Taken 10/25/2024 0000 by Sangeetha Garcia RN  Safety Promotion/Fall Prevention:   activity supervised   assistive device/personal items within reach   clutter free environment maintained   fall prevention program maintained   lighting adjusted   nonskid shoes/slippers when out of bed   room organization consistent   safety round/check completed  Taken 10/24/2024 2200 by Sangeetha Garcia RN  Safety Promotion/Fall Prevention:   activity supervised   assistive device/personal items within reach   clutter free environment maintained   fall prevention program maintained   lighting adjusted   nonskid shoes/slippers when out of bed   room organization consistent   safety round/check completed  Taken 10/24/2024 2000 by Sangeetha Garcia RN  Safety Promotion/Fall Prevention:   activity supervised   assistive device/personal items within reach   clutter free environment maintained   fall prevention program maintained   lighting  adjusted   nonskid shoes/slippers when out of bed   safety round/check completed   room organization consistent  Intervention: Prevent Skin Injury  Recent Flowsheet Documentation  Taken 10/25/2024 0400 by Sangeetha Garcia RN  Body Position:   position changed independently   turned   right  Skin Protection:   incontinence pads utilized   transparent dressing maintained  Taken 10/25/2024 0200 by Sangeetha Garcia RN  Body Position:   position changed independently   supine  Skin Protection:   incontinence pads utilized   transparent dressing maintained  Taken 10/25/2024 0000 by Sangeetha Garcia RN  Body Position:   position changed independently   tilted   left  Skin Protection:   incontinence pads utilized   transparent dressing maintained  Taken 10/24/2024 2200 by Sangeetha Garcia RN  Body Position:   position changed independently   supine  Skin Protection:   incontinence pads utilized   transparent dressing maintained  Taken 10/24/2024 2000 by Sangeetha Garcia RN  Body Position:   position changed independently   sitting up in bed  Skin Protection:   transparent dressing maintained   incontinence pads utilized  Intervention: Prevent and Manage VTE (Venous Thromboembolism) Risk  Recent Flowsheet Documentation  Taken 10/24/2024 2000 by Sangeetha Garcia RN  VTE Prevention/Management:   bilateral   SCDs (sequential compression devices) off   patient refused intervention  Intervention: Prevent Infection  Recent Flowsheet Documentation  Taken 10/25/2024 0400 by Sangeetha Garcia RN  Infection Prevention:   cohorting utilized   environmental surveillance performed   equipment surfaces disinfected   hand hygiene promoted   rest/sleep promoted  Taken 10/25/2024 0200 by Sangeetha Garcia RN  Infection Prevention:   cohorting utilized   environmental surveillance performed   equipment surfaces disinfected   hand hygiene promoted   rest/sleep promoted  Taken 10/25/2024 0000 by Sangeetha Garcia RN  Infection  Prevention:   cohorting utilized   environmental surveillance performed   equipment surfaces disinfected   hand hygiene promoted   rest/sleep promoted  Taken 10/24/2024 2200 by Sangeetha Garcia RN  Infection Prevention:   cohorting utilized   environmental surveillance performed   equipment surfaces disinfected   hand hygiene promoted   rest/sleep promoted  Taken 10/24/2024 2000 by Sangeetha Garcia RN  Infection Prevention:   cohorting utilized   environmental surveillance performed   equipment surfaces disinfected   hand hygiene promoted   rest/sleep promoted  Goal: Optimal Comfort and Wellbeing  Outcome: Progressing  Intervention: Provide Person-Centered Care  Recent Flowsheet Documentation  Taken 10/24/2024 2000 by Sangeetha Garcia RN  Trust Relationship/Rapport:   care explained   choices provided   emotional support provided   empathic listening provided   questions answered   reassurance provided   thoughts/feelings acknowledged   questions encouraged  Goal: Readiness for Transition of Care  Outcome: Progressing     Problem: Pain Acute  Goal: Optimal Pain Control and Function  Outcome: Progressing  Intervention: Optimize Psychosocial Wellbeing  Recent Flowsheet Documentation  Taken 10/25/2024 0400 by Sangeetha Garcia RN  Supportive Measures: active listening utilized  Taken 10/25/2024 0200 by Sangeetha Garcia RN  Supportive Measures: active listening utilized  Taken 10/25/2024 0000 by Sangeetha Garcia RN  Supportive Measures: active listening utilized  Taken 10/24/2024 2200 by Sangeetha Garcia RN  Supportive Measures: active listening utilized  Taken 10/24/2024 2000 by Sangeetha Garcia RN  Supportive Measures: active listening utilized  Diversional Activities: television  Intervention: Prevent or Manage Pain  Recent Flowsheet Documentation  Taken 10/25/2024 0400 by Sangeetha Garcia RN  Sensory Stimulation Regulation:   care clustered   lighting decreased  Sleep/Rest Enhancement: awakenings  minimized  Taken 10/25/2024 0200 by Sangeetha Garcia RN  Sensory Stimulation Regulation:   care clustered   lighting decreased  Sleep/Rest Enhancement: awakenings minimized  Taken 10/25/2024 0000 by Sangeetha Garcia RN  Sensory Stimulation Regulation:   care clustered   lighting decreased  Sleep/Rest Enhancement: awakenings minimized  Taken 10/24/2024 2200 by Sangeetha Garcia RN  Sensory Stimulation Regulation:   care clustered   lighting decreased  Taken 10/24/2024 2000 by Sangeetha Garcia RN  Sensory Stimulation Regulation:   care clustered   lighting decreased  Sleep/Rest Enhancement:   awakenings minimized   consistent schedule promoted   regular sleep/rest pattern promoted   noise level reduced  Medication Review/Management: medications reviewed     Problem: Fall Injury Risk  Goal: Absence of Fall and Fall-Related Injury  Outcome: Progressing  Intervention: Identify and Manage Contributors  Recent Flowsheet Documentation  Taken 10/25/2024 0400 by Sangeetha Garcia RN  Self-Care Promotion: independence encouraged  Taken 10/25/2024 0200 by Sangeetha Garcia RN  Self-Care Promotion: independence encouraged  Taken 10/25/2024 0000 by Sangeetha Garcia RN  Self-Care Promotion: independence encouraged  Taken 10/24/2024 2200 by Sangeetha Garcia RN  Self-Care Promotion: independence encouraged  Taken 10/24/2024 2000 by Sangeetha Garcia RN  Medication Review/Management: medications reviewed  Self-Care Promotion: independence encouraged  Intervention: Promote Injury-Free Environment  Recent Flowsheet Documentation  Taken 10/25/2024 0400 by Sangeetha Garcia RN  Safety Promotion/Fall Prevention:   activity supervised   assistive device/personal items within reach   clutter free environment maintained   fall prevention program maintained   lighting adjusted   nonskid shoes/slippers when out of bed   room organization consistent   safety round/check completed  Taken 10/25/2024 0200 by Sangeetha Garcia RN  Safety  Promotion/Fall Prevention:   activity supervised   assistive device/personal items within reach   clutter free environment maintained   fall prevention program maintained   lighting adjusted   nonskid shoes/slippers when out of bed   room organization consistent   safety round/check completed  Taken 10/25/2024 0000 by Sangeetha Garcia RN  Safety Promotion/Fall Prevention:   activity supervised   assistive device/personal items within reach   clutter free environment maintained   fall prevention program maintained   lighting adjusted   nonskid shoes/slippers when out of bed   room organization consistent   safety round/check completed  Taken 10/24/2024 2200 by Sangeetha Garcia RN  Safety Promotion/Fall Prevention:   activity supervised   assistive device/personal items within reach   clutter free environment maintained   fall prevention program maintained   lighting adjusted   nonskid shoes/slippers when out of bed   room organization consistent   safety round/check completed  Taken 10/24/2024 2000 by Sangeetha Garcia, RN  Safety Promotion/Fall Prevention:   activity supervised   assistive device/personal items within reach   clutter free environment maintained   fall prevention program maintained   lighting adjusted   nonskid shoes/slippers when out of bed   safety round/check completed   room organization consistent     Problem: Skin Injury Risk Increased  Goal: Skin Health and Integrity  Outcome: Progressing  Intervention: Optimize Skin Protection  Recent Flowsheet Documentation  Taken 10/25/2024 0400 by Sangeetha Garcia, RN  Activity Management: activity encouraged  Pressure Reduction Techniques:   frequent weight shift encouraged   weight shift assistance provided   pressure points protected  Head of Bed (HOB) Positioning: HOB elevated  Pressure Reduction Devices:   pressure-redistributing mattress utilized   positioning supports utilized  Skin Protection:   incontinence pads utilized   transparent dressing  maintained  Taken 10/25/2024 0200 by Sangeetha Garcia RN  Activity Management: activity encouraged  Pressure Reduction Techniques:   frequent weight shift encouraged   weight shift assistance provided   pressure points protected  Head of Bed (HOB) Positioning: HOB elevated  Pressure Reduction Devices:   pressure-redistributing mattress utilized   positioning supports utilized  Skin Protection:   incontinence pads utilized   transparent dressing maintained  Taken 10/25/2024 0000 by Sangeetha Garcia RN  Activity Management: activity encouraged  Pressure Reduction Techniques:   frequent weight shift encouraged   weight shift assistance provided   pressure points protected  Head of Bed (HOB) Positioning: HOB elevated  Pressure Reduction Devices:   pressure-redistributing mattress utilized   positioning supports utilized  Skin Protection:   incontinence pads utilized   transparent dressing maintained  Taken 10/24/2024 2200 by Sangeetha Garcia RN  Activity Management:   activity encouraged   back to bed  Pressure Reduction Techniques:   frequent weight shift encouraged   weight shift assistance provided   pressure points protected  Head of Bed (HOB) Positioning: [facility] elevated  Pressure Reduction Devices:   pressure-redistributing mattress utilized   positioning supports utilized  Skin Protection:   incontinence pads utilized   transparent dressing maintained  Taken 10/24/2024 2000 by Sangeetha Garcia RN  Activity Management: activity encouraged  Pressure Reduction Techniques:   frequent weight shift encouraged   weight shift assistance provided   pressure points protected  Head of Bed (HOB) Positioning: [facility] elevated  Pressure Reduction Devices:   pressure-redistributing mattress utilized   positioning supports utilized  Skin Protection:   transparent dressing maintained   incontinence pads utilized   Goal Outcome Evaluation:               10/24/24- Pt remains AOX4. RA to 1L when sleeping. NSR. Pt complained of a cough  beginning of shift, robitussin  was given, pt stated this helped. Upx1/ toilet. Plan is for pt to go to SNF at d/c. Plan of care ongoing.

## 2024-10-25 NOTE — PLAN OF CARE
Goal Outcome Evaluation:  Plan of Care Reviewed With: patient        Progress: improving  Outcome Evaluation: patient ambulated 60' with CGA/Min assist x1 and rolling walker for support, verbal cues to stay close to walker and to increase step length bilaterally and to avoid shuffling at times. Assist to control walker at times and to avoid obstacles in hallway. High fall risk d/t decreased safety awareness, Further mobility limited by weakness and fatigue. Recommend IRF at D/C for best functional outcome.

## 2024-10-25 NOTE — CASE MANAGEMENT/SOCIAL WORK
Continued Stay Note  [facility]     Patient Name: Billie Yu  MRN: [id number]  Today's Date: 10/25/2024    Admit Date: 10/22/2024    Plan: [facility]   Discharge Plan       Row Name 10/25/24 1459       Plan    Plan [facility]    Patient/Family in Agreement with Plan yes    Plan Comments Patient's plan is a skilled bed at [facility] on Monday, 10/28 PENDING insurance.  We will initiate precert with her insurance today.  Updated patient in room and son by phone.  CM will continue to follow.    Final Discharge Disposition Code 03 - skilled nursing facility (SNF)                   Discharge Codes    No documentation.                 Expected Discharge Date and Time       Expected Discharge Date Expected Discharge Time    Oct 26, 2024               Harriett Hillman RN

## 2024-10-26 PROCEDURE — 99232 SBSQ HOSP IP/OBS MODERATE 35: CPT | Performed by: STUDENT IN AN ORGANIZED HEALTH CARE EDUCATION/TRAINING PROGRAM

## 2024-10-26 PROCEDURE — G0378 HOSPITAL OBSERVATION PER HR: HCPCS

## 2024-10-26 RX ORDER — L.ACID,PARA/B.BIFIDUM/S.THERM 8B CELL
1 CAPSULE ORAL DAILY
Status: DISCONTINUED | OUTPATIENT
Start: 2024-10-26 | End: 2024-10-28 | Stop reason: HOSPADM

## 2024-10-26 RX ADMIN — DOXYCYCLINE 100 MG: 100 CAPSULE ORAL at 09:07

## 2024-10-26 RX ADMIN — Medication 10 ML: at 20:25

## 2024-10-26 RX ADMIN — AMIODARONE HYDROCHLORIDE 200 MG: 200 TABLET ORAL at 09:08

## 2024-10-26 RX ADMIN — SPIRONOLACTONE 25 MG: 25 TABLET ORAL at 09:07

## 2024-10-26 RX ADMIN — DONEPEZIL HYDROCHLORIDE 10 MG: 10 TABLET, FILM COATED ORAL at 20:25

## 2024-10-26 RX ADMIN — ACETAMINOPHEN 650 MG: 325 TABLET ORAL at 20:24

## 2024-10-26 RX ADMIN — ACETAMINOPHEN 650 MG: 325 TABLET ORAL at 13:23

## 2024-10-26 RX ADMIN — APIXABAN 2.5 MG: 2.5 TABLET, FILM COATED ORAL at 09:08

## 2024-10-26 RX ADMIN — CEFUROXIME AXETIL 500 MG: 250 TABLET, FILM COATED ORAL at 20:25

## 2024-10-26 RX ADMIN — Medication 10 ML: at 09:09

## 2024-10-26 RX ADMIN — POLYETHYLENE GLYCOL 3350 17 G: 17 POWDER, FOR SOLUTION ORAL at 20:25

## 2024-10-26 RX ADMIN — DOXYCYCLINE 100 MG: 100 CAPSULE ORAL at 20:24

## 2024-10-26 RX ADMIN — Medication 1 CAPSULE: at 13:23

## 2024-10-26 RX ADMIN — LISINOPRIL 10 MG: 10 TABLET ORAL at 09:07

## 2024-10-26 RX ADMIN — APIXABAN 2.5 MG: 2.5 TABLET, FILM COATED ORAL at 20:25

## 2024-10-26 RX ADMIN — CEFUROXIME AXETIL 500 MG: 250 TABLET, FILM COATED ORAL at 09:07

## 2024-10-26 NOTE — PLAN OF CARE
Problem: Adult Inpatient Plan of Care  Goal: Plan of Care Review  Outcome: Progressing  Goal: Patient-Specific Goal (Individualized)  Outcome: Progressing  Goal: Absence of Hospital-Acquired Illness or Injury  Outcome: Progressing  Intervention: Identify and Manage Fall Risk  Recent Flowsheet Documentation  Taken 10/26/2024 0400 by Sangeetha Garcia RN  Safety Promotion/Fall Prevention:   activity supervised   assistive device/personal items within reach   clutter free environment maintained   fall prevention program maintained   lighting adjusted   nonskid shoes/slippers when out of bed   room organization consistent   safety round/check completed  Taken 10/26/2024 0200 by Sangeetha Garcia RN  Safety Promotion/Fall Prevention:   activity supervised   assistive device/personal items within reach   clutter free environment maintained   fall prevention program maintained   lighting adjusted   nonskid shoes/slippers when out of bed   room organization consistent   safety round/check completed  Taken 10/26/2024 0000 by Sangeetha Garcia RN  Safety Promotion/Fall Prevention:   activity supervised   assistive device/personal items within reach   clutter free environment maintained   fall prevention program maintained   lighting adjusted   nonskid shoes/slippers when out of bed   room organization consistent   safety round/check completed  Taken 10/25/2024 2200 by Sangeetha Garcia RN  Safety Promotion/Fall Prevention:   activity supervised   assistive device/personal items within reach   clutter free environment maintained   fall prevention program maintained   lighting adjusted   nonskid shoes/slippers when out of bed   room organization consistent   safety round/check completed  Taken 10/25/2024 2000 by Sangeetha Garcia RN  Safety Promotion/Fall Prevention:   activity supervised   assistive device/personal items within reach   clutter free environment maintained   fall prevention program maintained   lighting  adjusted   nonskid shoes/slippers when out of bed   room organization consistent   safety round/check completed  Intervention: Prevent Skin Injury  Recent Flowsheet Documentation  Taken 10/26/2024 0400 by Sangeetha Garcia RN  Body Position:   position changed independently   tilted   right  Skin Protection:   incontinence pads utilized   transparent dressing maintained  Taken 10/26/2024 0200 by Sangeetha Garcia RN  Body Position:   position changed independently   supine  Skin Protection:   incontinence pads utilized   transparent dressing maintained  Taken 10/26/2024 0000 by Sangeetha Garcia RN  Body Position:   position changed independently   supine  Skin Protection:   incontinence pads utilized   transparent dressing maintained  Taken 10/25/2024 2200 by Sangeetha Garcia RN  Body Position:   position changed independently   turned   left  Skin Protection:   incontinence pads utilized   transparent dressing maintained  Taken 10/25/2024 2000 by Sangeetha Garcia RN  Body Position:   position changed independently   sitting up in bed  Skin Protection:   incontinence pads utilized   transparent dressing maintained  Intervention: Prevent and Manage VTE (Venous Thromboembolism) Risk  Recent Flowsheet Documentation  Taken 10/25/2024 2000 by Sangeetha Garcia RN  VTE Prevention/Management: (see mar)   bilateral   SCDs (sequential compression devices) off  Intervention: Prevent Infection  Recent Flowsheet Documentation  Taken 10/26/2024 0400 by Sangeetha Garcia RN  Infection Prevention:   cohorting utilized   environmental surveillance performed   equipment surfaces disinfected   hand hygiene promoted   rest/sleep promoted  Taken 10/26/2024 0200 by Sangeetha Garcia RN  Infection Prevention:   cohorting utilized   environmental surveillance performed   equipment surfaces disinfected   hand hygiene promoted   rest/sleep promoted  Taken 10/26/2024 0000 by Sangeetha Garcia RN  Infection Prevention:   cohorting  utilized   environmental surveillance performed   equipment surfaces disinfected   hand hygiene promoted   rest/sleep promoted  Taken 10/25/2024 2200 by Sangeetha Garcia RN  Infection Prevention:   cohorting utilized   environmental surveillance performed   equipment surfaces disinfected   hand hygiene promoted   rest/sleep promoted  Taken 10/25/2024 2000 by Sangeetha Garcia RN  Infection Prevention:   cohorting utilized   environmental surveillance performed   equipment surfaces disinfected   hand hygiene promoted   rest/sleep promoted  Goal: Optimal Comfort and Wellbeing  Outcome: Progressing  Intervention: Provide Person-Centered Care  Recent Flowsheet Documentation  Taken 10/25/2024 2000 by Sangeetha Garcia RN  Trust Relationship/Rapport:   care explained   choices provided   emotional support provided   empathic listening provided   questions answered   reassurance provided   questions encouraged   thoughts/feelings acknowledged  Goal: Readiness for Transition of Care  Outcome: Progressing     Problem: Pain Acute  Goal: Optimal Pain Control and Function  Outcome: Progressing  Intervention: Optimize Psychosocial Wellbeing  Recent Flowsheet Documentation  Taken 10/26/2024 0400 by Sangeetha Garcia RN  Supportive Measures: active listening utilized  Taken 10/26/2024 0200 by Sangeetha Garcia RN  Supportive Measures: active listening utilized  Taken 10/26/2024 0000 by Sangeetha Garcia RN  Supportive Measures: active listening utilized  Taken 10/25/2024 2200 by Sangeetha Garcia RN  Supportive Measures: active listening utilized  Taken 10/25/2024 2000 by Sangeetha Garcia RN  Supportive Measures: active listening utilized  Diversional Activities: television  Intervention: Prevent or Manage Pain  Recent Flowsheet Documentation  Taken 10/26/2024 0400 by Sangeetha Garcia RN  Sensory Stimulation Regulation:   care clustered   lighting decreased  Sleep/Rest Enhancement: awakenings minimized  Taken 10/26/2024 0200 by  Radha, Sangeetha, RN  Sensory Stimulation Regulation:   care clustered   lighting decreased  Sleep/Rest Enhancement: awakenings minimized  Taken 10/26/2024 0000 by Sangeetha Garcia RN  Sensory Stimulation Regulation:   care clustered   lighting decreased  Sleep/Rest Enhancement: awakenings minimized  Taken 10/25/2024 2200 by Sangeetha Garcia RN  Sensory Stimulation Regulation:   care clustered   lighting decreased  Sleep/Rest Enhancement: awakenings minimized  Taken 10/25/2024 2000 by Sangeetha Garcia RN  Sensory Stimulation Regulation:   care clustered   lighting decreased  Sleep/Rest Enhancement: awakenings minimized  Medication Review/Management: medications reviewed     Problem: Fall Injury Risk  Goal: Absence of Fall and Fall-Related Injury  Outcome: Progressing  Intervention: Identify and Manage Contributors  Recent Flowsheet Documentation  Taken 10/26/2024 0400 by Sangeetha Garcia RN  Self-Care Promotion: independence encouraged  Taken 10/26/2024 0200 by Sangeetha Garcia RN  Self-Care Promotion: independence encouraged  Taken 10/26/2024 0000 by Sangeetha Garcia RN  Self-Care Promotion: independence encouraged  Taken 10/25/2024 2200 by Sangeetha Garcia RN  Self-Care Promotion: independence encouraged  Taken 10/25/2024 2000 by Sangeetha Garcia RN  Medication Review/Management: medications reviewed  Self-Care Promotion: independence encouraged  Intervention: Promote Injury-Free Environment  Recent Flowsheet Documentation  Taken 10/26/2024 0400 by Sangeetha Garcia RN  Safety Promotion/Fall Prevention:   activity supervised   assistive device/personal items within reach   clutter free environment maintained   fall prevention program maintained   lighting adjusted   nonskid shoes/slippers when out of bed   room organization consistent   safety round/check completed  Taken 10/26/2024 0200 by Sangeetha Garcia RN  Safety Promotion/Fall Prevention:   activity supervised   assistive device/personal items  within reach   clutter free environment maintained   fall prevention program maintained   lighting adjusted   nonskid shoes/slippers when out of bed   room organization consistent   safety round/check completed  Taken 10/26/2024 0000 by Sangeetha Garcia RN  Safety Promotion/Fall Prevention:   activity supervised   assistive device/personal items within reach   clutter free environment maintained   fall prevention program maintained   lighting adjusted   nonskid shoes/slippers when out of bed   room organization consistent   safety round/check completed  Taken 10/25/2024 2200 by Sangeetha Garcia RN  Safety Promotion/Fall Prevention:   activity supervised   assistive device/personal items within reach   clutter free environment maintained   fall prevention program maintained   lighting adjusted   nonskid shoes/slippers when out of bed   room organization consistent   safety round/check completed  Taken 10/25/2024 2000 by Sangeetha Garcia RN  Safety Promotion/Fall Prevention:   activity supervised   assistive device/personal items within reach   clutter free environment maintained   fall prevention program maintained   lighting adjusted   nonskid shoes/slippers when out of bed   room organization consistent   safety round/check completed     Problem: Skin Injury Risk Increased  Goal: Skin Health and Integrity  Outcome: Progressing  Intervention: Optimize Skin Protection  Recent Flowsheet Documentation  Taken 10/26/2024 0400 by Sangeetha Garcia RN  Activity Management: activity encouraged  Pressure Reduction Techniques:   frequent weight shift encouraged   weight shift assistance provided   pressure points protected  Head of Bed (HOB) Positioning: HOB elevated  Pressure Reduction Devices:   pressure-redistributing mattress utilized   positioning supports utilized  Skin Protection:   incontinence pads utilized   transparent dressing maintained  Taken 10/26/2024 0200 by Sangeetha Garcia RN  Activity Management:  activity encouraged  Pressure Reduction Techniques:   frequent weight shift encouraged   weight shift assistance provided   pressure points protected  Head of Bed (HOB) Positioning: HOB elevated  Pressure Reduction Devices:   pressure-redistributing mattress utilized   positioning supports utilized  Skin Protection:   incontinence pads utilized   transparent dressing maintained  Taken 10/26/2024 0000 by Sangeetha Garcia RN  Activity Management: activity encouraged  Pressure Reduction Techniques:   frequent weight shift encouraged   weight shift assistance provided   pressure points protected  Head of Bed (HOB) Positioning: HOB elevated  Pressure Reduction Devices:   pressure-redistributing mattress utilized   positioning supports utilized  Skin Protection:   incontinence pads utilized   transparent dressing maintained  Taken 10/25/2024 2200 by Sangeetha Garcia RN  Activity Management: activity encouraged  Pressure Reduction Techniques:   frequent weight shift encouraged   weight shift assistance provided   pressure points protected  Head of Bed (HOB) Positioning: [facility] elevated  Pressure Reduction Devices:   pressure-redistributing mattress utilized   positioning supports utilized  Skin Protection:   incontinence pads utilized   transparent dressing maintained  Taken 10/25/2024 2000 by Sangeetha Garcia RN  Activity Management: activity encouraged  Pressure Reduction Techniques:   frequent weight shift encouraged   weight shift assistance provided   pressure points protected  Head of Bed (HOB) Positioning: [facility] elevated  Pressure Reduction Devices:   pressure-redistributing mattress utilized   positioning supports utilized  Skin Protection:   incontinence pads utilized   transparent dressing maintained   Goal Outcome Evaluation:         10/25- Pt remains AOX4. RA to 1L when sleeping. NSR. Pt complained of some coughing at beginning of shift and I called nurse mohan to have robitussin ordered upon pt request. Plan of  care ongoing.

## 2024-10-26 NOTE — PLAN OF CARE
Problem: Adult Inpatient Plan of Care  Goal: Plan of Care Review  Outcome: Progressing  Goal: Patient-Specific Goal (Individualized)  Outcome: Progressing  Goal: Absence of Hospital-Acquired Illness or Injury  Outcome: Progressing  Intervention: Identify and Manage Fall Risk  Recent Flowsheet Documentation  Taken 10/26/2024 1800 by Zina Herzog RN  Safety Promotion/Fall Prevention:   activity supervised   assistive device/personal items within reach   clutter free environment maintained   nonskid shoes/slippers when out of bed   room organization consistent   safety round/check completed  Taken 10/26/2024 1600 by Zina Herzog RN  Safety Promotion/Fall Prevention:   assistive device/personal items within reach   activity supervised   clutter free environment maintained   nonskid shoes/slippers when out of bed   room organization consistent   safety round/check completed  Taken 10/26/2024 1400 by Zina Herzog RN  Safety Promotion/Fall Prevention:   activity supervised   assistive device/personal items within reach   clutter free environment maintained   room organization consistent   safety round/check completed   nonskid shoes/slippers when out of bed  Taken 10/26/2024 1200 by Zina Herzog RN  Safety Promotion/Fall Prevention:   activity supervised   assistive device/personal items within reach   clutter free environment maintained   nonskid shoes/slippers when out of bed   room organization consistent   safety round/check completed  Taken 10/26/2024 1000 by Zina Herzog RN  Safety Promotion/Fall Prevention:   activity supervised   clutter free environment maintained   assistive device/personal items within reach   room organization consistent   safety round/check completed   nonskid shoes/slippers when out of bed  Taken 10/26/2024 0800 by Zina Herzog RN  Safety Promotion/Fall Prevention:   activity supervised   assistive device/personal items within reach   clutter free  environment maintained   nonskid shoes/slippers when out of bed   room organization consistent   safety round/check completed   fall prevention program maintained  Intervention: Prevent Skin Injury  Recent Flowsheet Documentation  Taken 10/26/2024 1800 by Zina Herzog RN  Body Position:   position changed independently   left   tilted  Skin Protection:   transparent dressing maintained   skin sealant/moisture barrier applied  Taken 10/26/2024 1600 by Zina Herzog RN  Body Position:   legs elevated   weight shifting  Skin Protection:   transparent dressing maintained   skin sealant/moisture barrier applied   silicone foam dressing in place   pulse oximeter probe site changed  Taken 10/26/2024 1400 by Zina Herzog RN  Body Position: (sitting up in chair) other (see comments)  Skin Protection:   transparent dressing maintained   skin sealant/moisture barrier applied  Taken 10/26/2024 1200 by Zina Herzog RN  Body Position:   position changed independently   sitting up in bed  Skin Protection:   transparent dressing maintained   skin sealant/moisture barrier applied   silicone foam dressing in place  Taken 10/26/2024 1000 by Zina Herzog RN  Body Position:   position changed independently   turned   side-lying  Skin Protection:   transparent dressing maintained   skin sealant/moisture barrier applied  Taken 10/26/2024 0800 by Zina Herzog RN  Body Position:   position changed independently   right   side-lying  Skin Protection:   skin sealant/moisture barrier applied   transparent dressing maintained  Intervention: Prevent and Manage VTE (Venous Thromboembolism) Risk  Recent Flowsheet Documentation  Taken 10/26/2024 1800 by Zina Herzog RN  VTE Prevention/Management: (see mar) other (see comments)  Taken 10/26/2024 1600 by Zina Herzog RN  VTE Prevention/Management: (see mar) other (see comments)  Taken 10/26/2024 1400 by Zina Herzog RN  VTE Prevention/Management:  (see mar) other (see comments)  Taken 10/26/2024 1200 by Zina Herzog RN  VTE Prevention/Management: (see mar) other (see comments)  Taken 10/26/2024 1000 by Zina Herzog RN  VTE Prevention/Management: (see mar) other (see comments)  Taken 10/26/2024 0800 by Zina Herzog RN  VTE Prevention/Management: (see mar) other (see comments)  Intervention: Prevent Infection  Recent Flowsheet Documentation  Taken 10/26/2024 1800 by Zina Herzog RN  Infection Prevention:   environmental surveillance performed   equipment surfaces disinfected  Taken 10/26/2024 1600 by Zina Herzog RN  Infection Prevention:   environmental surveillance performed   equipment surfaces disinfected  Taken 10/26/2024 1400 by Zina Herozg RN  Infection Prevention:   environmental surveillance performed   equipment surfaces disinfected  Taken 10/26/2024 1200 by Zina Herzog RN  Infection Prevention:   environmental surveillance performed   equipment surfaces disinfected   hand hygiene promoted  Taken 10/26/2024 1000 by Zina Herzog RN  Infection Prevention:   environmental surveillance performed   equipment surfaces disinfected  Taken 10/26/2024 0800 by Zina Herzog RN  Infection Prevention:   environmental surveillance performed   equipment surfaces disinfected  Goal: Optimal Comfort and Wellbeing  Outcome: Progressing  Intervention: Monitor Pain and Promote Comfort  Recent Flowsheet Documentation  Taken 10/26/2024 1323 by Zina Herzog RN  Pain Management Interventions: pain medication given  Intervention: Provide Person-Centered Care  Recent Flowsheet Documentation  Taken 10/26/2024 0800 by Zina Herzog RN  Trust Relationship/Rapport:   care explained   choices provided  Goal: Readiness for Transition of Care  Outcome: Progressing   Goal Outcome Evaluation:

## 2024-10-27 LAB
BACTERIA SPEC RESP CULT: NORMAL
GRAM STN SPEC: NORMAL

## 2024-10-27 PROCEDURE — 99232 SBSQ HOSP IP/OBS MODERATE 35: CPT | Performed by: STUDENT IN AN ORGANIZED HEALTH CARE EDUCATION/TRAINING PROGRAM

## 2024-10-27 PROCEDURE — G0378 HOSPITAL OBSERVATION PER HR: HCPCS

## 2024-10-27 RX ORDER — FAMOTIDINE 20 MG/1
20 TABLET, FILM COATED ORAL
Status: DISCONTINUED | OUTPATIENT
Start: 2024-10-27 | End: 2024-10-28 | Stop reason: HOSPADM

## 2024-10-27 RX ADMIN — Medication 10 ML: at 21:56

## 2024-10-27 RX ADMIN — APIXABAN 2.5 MG: 2.5 TABLET, FILM COATED ORAL at 20:43

## 2024-10-27 RX ADMIN — DOXYCYCLINE 100 MG: 100 CAPSULE ORAL at 08:34

## 2024-10-27 RX ADMIN — LISINOPRIL 10 MG: 10 TABLET ORAL at 08:34

## 2024-10-27 RX ADMIN — FAMOTIDINE 20 MG: 20 TABLET, FILM COATED ORAL at 16:50

## 2024-10-27 RX ADMIN — Medication 1 CAPSULE: at 08:34

## 2024-10-27 RX ADMIN — SENNOSIDES AND DOCUSATE SODIUM 2 TABLET: 50; 8.6 TABLET ORAL at 10:34

## 2024-10-27 RX ADMIN — ACETAMINOPHEN 650 MG: 325 TABLET ORAL at 01:09

## 2024-10-27 RX ADMIN — AMIODARONE HYDROCHLORIDE 200 MG: 200 TABLET ORAL at 08:34

## 2024-10-27 RX ADMIN — APIXABAN 2.5 MG: 2.5 TABLET, FILM COATED ORAL at 08:34

## 2024-10-27 RX ADMIN — DONEPEZIL HYDROCHLORIDE 10 MG: 10 TABLET, FILM COATED ORAL at 20:43

## 2024-10-27 RX ADMIN — SPIRONOLACTONE 25 MG: 25 TABLET ORAL at 08:34

## 2024-10-27 RX ADMIN — CEFUROXIME AXETIL 500 MG: 250 TABLET, FILM COATED ORAL at 20:44

## 2024-10-27 RX ADMIN — DOXYCYCLINE 100 MG: 100 CAPSULE ORAL at 20:43

## 2024-10-27 RX ADMIN — CEFUROXIME AXETIL 500 MG: 250 TABLET, FILM COATED ORAL at 08:34

## 2024-10-27 RX ADMIN — GUAIFENESIN 200 MG: 200 SOLUTION ORAL at 21:56

## 2024-10-27 RX ADMIN — ACETAMINOPHEN 650 MG: 325 TABLET ORAL at 20:43

## 2024-10-27 NOTE — PROGRESS NOTES
[facility]  PROGRESS NOTE    Patient Name: Billie Yu  : 1935  MRN: [id number]    Date of Admission: 10/22/2024  Primary Care Physician: Elvi Ventura MD    Subjective   Subjective     CC:  Mechanical fall at home    HPI:  Reports her cough is improved.  No chest pain.  No headache.  No GI or  symptoms.  She is up in the bedside chair.    Objective   Objective     Vital Signs:   Temp:  [97.7 °F (36.5 °C)-98.3 °F (36.8 °C)] 97.7 °F (36.5 °C)  Heart Rate:  [61-71] 61  Resp:  [16-20] 18  BP: (138-170)/(60-75) 170/75     Physical Exam:  Constitutional: No acute distress, awake, alert, sitting up  HENT: NC, bilateral periorbital ecchymoses, mucous membranes moist  Respiratory: Clear to auscultation bilaterally, respiratory effort normal   Cardiovascular: RRR  Gastrointestinal: Normoactive bowel sounds, soft, nontender, nondistended  Musculoskeletal: No bilateral ankle edema  Psychiatric: Appropriate affect, cooperative  Neurologic: Oriented x 3, strength symmetric in all extremities, Cranial Nerves grossly intact to confrontation, speech clear  Skin: No rashes, multiple abrasions on bilateral upper extremities covered in bandages      Results Reviewed:  LAB RESULTS:      Lab 10/23/24  0903 10/22/24  1020   WBC 7.97 9.46   HEMOGLOBIN 13.4 13.8   HEMATOCRIT 40.2 42.4   PLATELETS 209 209   NEUTROS ABS 6.59  --    IMMATURE GRANS (ABS) 0.02  --    LYMPHS ABS 0.57*  --    MONOS ABS 0.73  --    EOS ABS 0.04  --    MCV 94.4 94.4         Lab 10/23/24  0903 10/22/24  1020   SODIUM 134* 135*   POTASSIUM 4.3 4.4   CHLORIDE 100 97*   CO2 25.0 28.3   ANION GAP 9.0 9.7   BUN 23 25*   CREATININE 0.55* 0.72   EGFR 89.4 81.5   GLUCOSE 113* 110*   CALCIUM 9.1 9.7   HEMOGLOBIN A1C  --  5.70*   TSH  --  1.380         Lab 10/23/24  0903 10/22/24  1020   TOTAL PROTEIN 6.7 7.1   ALBUMIN 3.7 4.1   GLOBULIN 3.0 3.0   ALT (SGPT) 16 20   AST (SGOT) 20 21   BILIRUBIN 0.5 0.5   ALK PHOS 69  68             Lab 10/22/24  1020   CHOLESTEROL 186   LDL CHOL 90   HDL CHOL 84*   TRIGLYCERIDES 66             Brief Urine Lab Results  (Last result in the past 365 days)        Color   Clarity   Blood   Leuk Est   Nitrite   Protein   CREAT   Urine HCG        10/22/24 2004 Yellow   Clear   Large (3+)   Trace   Negative   30 mg/dL (1+)                   Microbiology Results Abnormal       Procedure Component Value - Date/Time    Respiratory Culture - Sputum, Cough [[id number]] Collected: 10/25/24 1926    Lab Status: Final result Specimen: Sputum from Cough Updated: 10/27/24 0920     Respiratory Culture Light growth (2+) Normal respiratory swapna. No S. aureus or Pseudomonas aeruginosa detected. Final report.     Gram Stain Many (4+) WBCs per low power field      Rare (1+) Epithelial cells per low power field      Moderate (3+) Gram negative cocci, intracellular and extracellular      Few (2+) Gram positive cocci in pairs    Respiratory Culture - Sputum, Cough [[id number]] Collected: 10/25/24 1337    Lab Status: Final result Specimen: Sputum from Cough Updated: 10/25/24 1437     Respiratory Culture Rejected     Gram Stain No WBCs per low power field      No Epithelial cells per low power field      Moderate (3+) Gram positive cocci    Narrative:      Specimen rejected due to oropharyngeal contamination. Please reorder and recollect specimen if clinically necessary.    Respiratory Panel PCR w/COVID-19(SARS-CoV-2) ANN/BEBO/ELKE/[facility]/PRABHAK[facility] In-House, NP Swab in UTM/VTM, 2 HR TAT - Swab, Nasopharynx [[id number]]  (Normal) Collected: 10/25/24 1337    Lab Status: Final result Specimen: Swab from Nasopharynx Updated: 10/25/24 1435     ADENOVIRUS, PCR Not Detected     Coronavirus 229E Not Detected     Coronavirus HKU1 Not Detected     Coronavirus NL63 Not Detected     Coronavirus OC43 Not Detected     COVID19 Not Detected     Human Metapneumovirus Not Detected     Human Rhinovirus/Enterovirus Not Detected     Influenza A PCR Not  Detected     Influenza B PCR Not Detected     Parainfluenza Virus 1 Not Detected     Parainfluenza Virus 2 Not Detected     Parainfluenza Virus 3 Not Detected     Parainfluenza Virus 4 Not Detected     RSV, PCR Not Detected     Bordetella pertussis pcr Not Detected     Bordetella parapertussis PCR Not Detected     Chlamydophila pneumoniae PCR Not Detected     Mycoplasma pneumo by PCR Not Detected    Narrative:      In the setting of a positive respiratory panel with a viral infection PLUS a negative procalcitonin without other underlying concern for bacterial infection, consider observing off antibiotics or discontinuation of antibiotics and continue supportive care. If the respiratory panel is positive for atypical bacterial infection (Bordetella pertussis, Chlamydophila pneumoniae, or Mycoplasma pneumoniae), consider antibiotic de-escalation to target atypical bacterial infection.            No radiology results from the last 24 hrs    Results for orders placed during the hospital encounter of 06/19/24    Adult Transthoracic Echo Complete With Contrast if Necessary Per Protocol    Interpretation Summary    Left ventricular ejection fraction appears to be in the lower limits of normal    Left atrial volume is moderately increased.    The right atrial cavity is borderline dilated.    Moderate aortic valve regurgitation is present.    Mild aortic valve stenosis is present. Aortic valve area is 1.2 cm2.    Peak velocity of the flow distal to the aortic valve is 277 cm/s. Aortic valve maximum pressure gradient is 31 mmHg. Aortic valve mean pressure gradient is 17 mmHg. Aortic valve dimensionless index is 0.39 .    Estimated right ventricular systolic pressure from tricuspid regurgitation is mildly elevated (35-45 mmHg).      Current medications:  Scheduled Meds:amiodarone, 200 mg, Oral, Daily  apixaban, 2.5 mg, Oral, Q12H  cefuroxime, 500 mg, Oral, Q12H  donepezil, 10 mg, Oral, Nightly  doxycycline, 100 mg, Oral,  Q12H  famotidine, 20 mg, Oral, BID AC  lactobacillus acidophilus, 1 capsule, Oral, Daily  lisinopril, 10 mg, Oral, Daily  sodium chloride, 10 mL, Intravenous, Q12H  spironolactone, 25 mg, Oral, Daily      Continuous Infusions:   PRN Meds:.  acetaminophen    senna-docusate sodium **AND** polyethylene glycol **AND** bisacodyl **AND** bisacodyl    Calcium Replacement - Follow Nurse / BPA Driven Protocol    guaifenesin    Magnesium Standard Dose Replacement - Follow Nurse / BPA Driven Protocol    melatonin    Phosphorus Replacement - Follow Nurse / BPA Driven Protocol    Potassium Replacement - Follow Nurse / BPA Driven Protocol    sodium chloride    Tetanus-Diphth-Acell Pertussis    Assessment & Plan   Assessment & Plan     Active Hospital Problems    Diagnosis  POA    **Fall [W19.XXXA]  Yes    Traumatic hematoma of forehead [S00.83XA]  Unknown    Generalized weakness [R53.1]  Unknown    Hard of hearing [H91.90]  Unknown    PAF (paroxysmal atrial fibrillation) [I48.0]  Yes    Memory impairment [R41.3]  Yes      Resolved Hospital Problems   No resolved problems to display.        Brief Hospital Course to date:  Billie Yu is a 88 y.o. female with significant medical history for paroxysmal atrial fibrillation, chronic anticoagulation, syncope who presented to [facility] after a fall at home sustaining a head injury.     This patient's problems and plans were partially entered by my partner and updated as appropriate by me 10/27/24.    Mechanical fall with head injury  Generalized weakness  -CT head showed right forehead hematoma, otherwise nonacute  -PT/OT evaluated, recommended IPR on discharge.  Patient amenable.    Cough with congestion  -symptoms present prior to admission  -resp viral PCR negative, respiratory culture w normal resp swapna (finalized)  -Cefuroxime, doxy x5 days  -probiotic  -airway clearance    Nodular changes bilateral lungs  -CT chest showed patchy opacities and nodular changes  within lungs bilaterally, may represent chronic infectious/inflammatory process.  -Follow-up chest CT in 3 months to exclude underlying pulmonary lesion/mass.  My practice partner discussed with son on 10/25.     Hiatal / paraesophageal hernia  -CT chest showed wall thickening of distal esophagus, ?may represent esophagitis.    -Consider outpatient endoscopic evaluation if any symptoms occur.  -Pepcid     HTN- continue lisinopril, spironolactone.   Atrial fibrillation- continue amiodarone, Eliquis.  Dementia- continue donepezil.  Hard of hearing- Patient wears hearing aids, not present on admission.    Expected Discharge Location and Transportation: SNF, hopefully on Monday   Expected Discharge   Expected Discharge Date: 10/28/2024; Expected Discharge Time:      VTE Prophylaxis:  Pharmacologic & mechanical VTE prophylaxis orders are present.         AM-PAC 6 Clicks Score (PT): 17 (10/27/24 0800)    CODE STATUS:   Code Status and Medical Interventions: CPR (Attempt to Resuscitate); Full Support   Ordered at: 10/22/24 2043     Level Of Support Discussed With:    Patient     Code Status (Patient has no pulse and is not breathing):    CPR (Attempt to Resuscitate)     Medical Interventions (Patient has pulse or is breathing):    Full Support       Keira Pacheco MD  10/27/24

## 2024-10-27 NOTE — PLAN OF CARE
Goal Outcome Evaluation:No complaints of pain this shift.  Able to ambulate to bathroom, using walker, with touch assist. Anticipating transfer to rehab facility tomorrow.

## 2024-10-27 NOTE — PLAN OF CARE
Problem: Adult Inpatient Plan of Care  Goal: Plan of Care Review  Outcome: Progressing  Goal: Patient-Specific Goal (Individualized)  Outcome: Progressing  Goal: Absence of Hospital-Acquired Illness or Injury  Outcome: Progressing  Intervention: Identify and Manage Fall Risk  Recent Flowsheet Documentation  Taken 10/26/2024 2200 by Bill Harvey RN  Safety Promotion/Fall Prevention:   activity supervised   assistive device/personal items within reach   clutter free environment maintained   fall prevention program maintained   safety round/check completed   room organization consistent  Taken 10/26/2024 2000 by Bill Harvey RN  Safety Promotion/Fall Prevention:   activity supervised   assistive device/personal items within reach   clutter free environment maintained   fall prevention program maintained   safety round/check completed   room organization consistent  Intervention: Prevent Skin Injury  Recent Flowsheet Documentation  Taken 10/26/2024 2200 by Bill Harvey RN  Body Position: supine  Skin Protection:   transparent dressing maintained   skin sealant/moisture barrier applied  Taken 10/26/2024 2000 by Bill Harvey RN  Body Position: supine  Skin Protection:   transparent dressing maintained   skin sealant/moisture barrier applied  Intervention: Prevent and Manage VTE (Venous Thromboembolism) Risk  Recent Flowsheet Documentation  Taken 10/26/2024 2000 by Bill Harvey RN  VTE Prevention/Management: (eliquis) other (see comments)  Intervention: Prevent Infection  Recent Flowsheet Documentation  Taken 10/26/2024 2200 by Bill Harvey RN  Infection Prevention: environmental surveillance performed  Taken 10/26/2024 2000 by Bill Harvey RN  Infection Prevention: environmental surveillance performed  Goal: Optimal Comfort and Wellbeing  Outcome: Progressing  Intervention: Provide Person-Centered Care  Recent Flowsheet Documentation  Taken 10/26/2024 2200 by Bill Harvey  RN  Trust Relationship/Rapport:   care explained   choices provided   emotional support provided   empathic listening provided   questions answered   questions encouraged   reassurance provided   thoughts/feelings acknowledged  Taken 10/26/2024 2000 by Bill Harvey RN  Trust Relationship/Rapport:   care explained   choices provided   emotional support provided   empathic listening provided   questions answered   questions encouraged   reassurance provided   thoughts/feelings acknowledged  Goal: Readiness for Transition of Care  Outcome: Progressing     Problem: Pain Acute  Goal: Optimal Pain Control and Function  Outcome: Progressing  Intervention: Optimize Psychosocial Wellbeing  Recent Flowsheet Documentation  Taken 10/26/2024 2200 by Bill Harvey RN  Supportive Measures:   active listening utilized   verbalization of feelings encouraged  Taken 10/26/2024 2000 by Bill Harvey RN  Supportive Measures:   active listening utilized   verbalization of feelings encouraged  Intervention: Prevent or Manage Pain  Recent Flowsheet Documentation  Taken 10/26/2024 2200 by Bill Harvey RN  Sleep/Rest Enhancement:   awakenings minimized   noise level reduced   room darkened  Taken 10/26/2024 2000 by Bill Harvey RN  Bowel Elimination Promotion: ambulation promoted  Sleep/Rest Enhancement:   awakenings minimized   noise level reduced   room darkened     Problem: Fall Injury Risk  Goal: Absence of Fall and Fall-Related Injury  Outcome: Progressing  Intervention: Identify and Manage Contributors  Recent Flowsheet Documentation  Taken 10/26/2024 2200 by Bill Harvey RN  Self-Care Promotion: independence encouraged  Taken 10/26/2024 2000 by Bill Harvey RN  Self-Care Promotion: independence encouraged  Intervention: Promote Injury-Free Environment  Recent Flowsheet Documentation  Taken 10/26/2024 2200 by Bill Harvey RN  Safety Promotion/Fall Prevention:   activity supervised   assistive  device/personal items within reach   clutter free environment maintained   fall prevention program maintained   safety round/check completed   room organization consistent  Taken 10/26/2024 2000 by Bill Harvey RN  Safety Promotion/Fall Prevention:   activity supervised   assistive device/personal items within reach   clutter free environment maintained   fall prevention program maintained   safety round/check completed   room organization consistent     Problem: Skin Injury Risk Increased  Goal: Skin Health and Integrity  Outcome: Progressing  Intervention: Optimize Skin Protection  Recent Flowsheet Documentation  Taken 10/26/2024 2200 by Bill Harvey RN  Activity Management: activity encouraged  Pressure Reduction Techniques: frequent weight shift encouraged  Head of Bed (HOB) Positioning: HOB elevated  Pressure Reduction Devices: pressure-redistributing mattress utilized  Skin Protection:   transparent dressing maintained   skin sealant/moisture barrier applied  Taken 10/26/2024 2000 by Bill Harvey RN  Activity Management: activity encouraged  Pressure Reduction Techniques: frequent weight shift encouraged  Head of Bed (HOB) Positioning: HOB elevated  Pressure Reduction Devices: pressure-redistributing mattress utilized  Skin Protection:   transparent dressing maintained   skin sealant/moisture barrier applied   Goal Outcome Evaluation:

## 2024-10-28 VITALS
BODY MASS INDEX: 21.2 KG/M2 | HEIGHT: 60 IN | DIASTOLIC BLOOD PRESSURE: 72 MMHG | TEMPERATURE: 98.5 F | OXYGEN SATURATION: 94 % | RESPIRATION RATE: 16 BRPM | HEART RATE: 69 BPM | WEIGHT: 108 LBS | SYSTOLIC BLOOD PRESSURE: 162 MMHG

## 2024-10-28 PROCEDURE — 99239 HOSP IP/OBS DSCHRG MGMT >30: CPT | Performed by: STUDENT IN AN ORGANIZED HEALTH CARE EDUCATION/TRAINING PROGRAM

## 2024-10-28 PROCEDURE — 90471 IMMUNIZATION ADMIN: CPT | Performed by: EMERGENCY MEDICINE

## 2024-10-28 PROCEDURE — G0378 HOSPITAL OBSERVATION PER HR: HCPCS

## 2024-10-28 PROCEDURE — 25010000002 TETANUS-DIPHTH-ACELL PERTUSSIS 5-2.5-18.5 LF-MCG/0.5 SUSPENSION PREFILLED SYRINGE: Performed by: EMERGENCY MEDICINE

## 2024-10-28 PROCEDURE — 90715 TDAP VACCINE 7 YRS/> IM: CPT | Performed by: EMERGENCY MEDICINE

## 2024-10-28 RX ORDER — DOXYCYCLINE 100 MG/1
100 CAPSULE ORAL EVERY 12 HOURS SCHEDULED
Qty: 4 CAPSULE | Refills: 0 | Status: SHIPPED | OUTPATIENT
Start: 2024-10-28 | End: 2024-10-30

## 2024-10-28 RX ORDER — FAMOTIDINE 20 MG/1
20 TABLET, FILM COATED ORAL
Start: 2024-10-28

## 2024-10-28 RX ORDER — L.ACID,PARA/B.BIFIDUM/S.THERM 8B CELL
1 CAPSULE ORAL DAILY
Start: 2024-10-29

## 2024-10-28 RX ORDER — CEFUROXIME AXETIL 500 MG/1
500 TABLET ORAL EVERY 12 HOURS SCHEDULED
Qty: 4 TABLET | Refills: 0 | Status: SHIPPED | OUTPATIENT
Start: 2024-10-28 | End: 2024-10-30

## 2024-10-28 RX ORDER — GUAIFENESIN 200 MG/10ML
200 LIQUID ORAL EVERY 4 HOURS PRN
Start: 2024-10-28

## 2024-10-28 RX ORDER — ACETAMINOPHEN 325 MG/1
650 TABLET ORAL EVERY 6 HOURS PRN
Start: 2024-10-28

## 2024-10-28 RX ADMIN — APIXABAN 2.5 MG: 2.5 TABLET, FILM COATED ORAL at 08:36

## 2024-10-28 RX ADMIN — Medication 1 CAPSULE: at 08:36

## 2024-10-28 RX ADMIN — FAMOTIDINE 20 MG: 20 TABLET, FILM COATED ORAL at 08:36

## 2024-10-28 RX ADMIN — CEFUROXIME AXETIL 500 MG: 250 TABLET, FILM COATED ORAL at 08:36

## 2024-10-28 RX ADMIN — SPIRONOLACTONE 25 MG: 25 TABLET ORAL at 08:36

## 2024-10-28 RX ADMIN — Medication 10 ML: at 08:37

## 2024-10-28 RX ADMIN — TETANUS TOXOID, REDUCED DIPHTHERIA TOXOID AND ACELLULAR PERTUSSIS VACCINE, ADSORBED 0.5 ML: 5; 2.5; 8; 8; 2.5 SUSPENSION INTRAMUSCULAR at 14:57

## 2024-10-28 RX ADMIN — LISINOPRIL 10 MG: 10 TABLET ORAL at 08:36

## 2024-10-28 RX ADMIN — DOXYCYCLINE 100 MG: 100 CAPSULE ORAL at 08:36

## 2024-10-28 RX ADMIN — AMIODARONE HYDROCHLORIDE 200 MG: 200 TABLET ORAL at 08:36

## 2024-10-28 NOTE — DISCHARGE PLACEMENT REQUEST
"Billie Yu (88 y.o. Female)     Harriett Hillman, RN  [phone]        Date of Birth   1935    Social Security Number       Address   [address] [place], [place] [address]    Home Phone       MRN   [id number]       Christian   Holiness    Marital Status                               Admission Date   10/22/24    Admission Type   Emergency    Admitting Provider   Keira Pacheco MD    Attending Provider   Keira Pacheco MD    Department, Room/Bed   [facility] 3E, S342/1       Discharge Date       Discharge Disposition   Rehab Facility or Unit (DC - External)    Discharge Destination                                 Attending Provider: Keira Pacheco MD    Allergies: No Known Allergies    Isolation: None   Infection: None   Code Status: CPR    Ht: 152.4 cm (60\")   Wt: 49 kg (108 lb)    Admission Cmt: None   Principal Problem: Fall [W19.XXXA]                   Active Insurance as of 10/22/2024       Primary Coverage       Payor Plan Insurance Group Employer/Plan Group    [facility] MEDICARE REPLACEMENT [facility] MED ADV [employer] [id number]       Payor Plan Address Payor Plan Phone Number Payor Plan Fax Number Effective Dates    [address] [address]   2024 - None Entered    [facility] [id number]         Subscriber Name Subscriber Birth Date Member ID       BILLIE YU 1935 [id number]                     Emergency Contacts        (Rel.) Home Phone Work Phone Mobile Phone    VIRGLI YU (Son) [phone] -- [phone]    CATRACHITO YU (Relative) [phone] -- [phone]                 Discharge Summary        Keira Pacheco MD at 10/28/24 [facility]              [facility]  DISCHARGE SUMMARY    Patient Name: Billie Yu  : 1935  MRN: [id number]    Date of Admission: 10/22/2024  4:25 PM  Date of Discharge: 10/28/2024  Primary Care Physician: Elvi Ventura MD    Consults       No orders found from 2024 to " 10/23/2024.            Hospital Course     Presenting Problem: Fall    Active Hospital Problems    Diagnosis  POA    **Fall [W19.XXXA]  Yes    Traumatic hematoma of forehead [S00.83XA]  Unknown    Generalized weakness [R53.1]  Unknown    Hard of hearing [H91.90]  Unknown    PAF (paroxysmal atrial fibrillation) [I48.0]  Yes    Memory impairment [R41.3]  Yes      Resolved Hospital Problems   No resolved problems to display.          Hospital Course:  Billie Yu is a 88 y.o. female with significant medical history for paroxysmal atrial fibrillation, chronic anticoagulation, syncope who presented to [facility] after a fall at home sustaining a head injury.      This patient's problems and plans were partially entered by my partner and updated as appropriate by me 10/28/24.     Mechanical fall with head injury  Generalized weakness  -CT head showed right forehead hematoma, otherwise nonacute  -PT/OT evaluated, recommended IPR on discharge.  Patient amenable.  Discharged to rehab today.     Cough with congestion  -symptoms present prior to admission  -resp viral PCR negative, respiratory culture w normal resp swapna (finalized)  -Cefuroxime, doxy to complete a total of 5 days   -probiotic     Nodular changes bilateral lungs  -CT chest showed patchy opacities and nodular changes within lungs bilaterally, may represent chronic infectious/inflammatory process.  -Follow-up chest CT in 3 months to exclude underlying pulmonary lesion/mass.  My practice partner discussed with son on 10/25.      Hiatal / paraesophageal hernia  -CT chest showed wall thickening of distal esophagus, may represent esophagitis.    -Consider outpatient endoscopic evaluation if any symptoms occur.  -Pepcid     HTN- continue lisinopril, spironolactone.   Atrial fibrillation- continue amiodarone, Eliquis.  Dementia- continue donepezil.  Hard of hearing- Patient wears hearing aids, not present on admission.      Discharge Follow Up  Recommendations for outpatient labs/diagnostics:  Follow-up with primary care doctor in 5 to 7 days after discharge from facility regarding this hospitalization, chronic disease management, repeat labs (CBC, CMP)  Follow-up for lung nodule clinic in 3 months    Day of Discharge     HPI:   Patient reports feeling well today.  No headache.  No facial pain.  No visual changes.  Eating and drinking well.  No chest pain or shortness of breath.  Has a mild cough but is improved.  On room air now.  No focal weakness.  No numbness or tingling.    Review of Systems  As above    Vital Signs:   Temp:  [98 °F (36.7 °C)-98.4 °F (36.9 °C)] 98 °F (36.7 °C)  Heart Rate:  [69] 69  Resp:  [16-18] 16  BP: (147-167)/(67-77) 156/77      Physical Exam:  Constitutional: No acute distress, awake, alert  HENT: NC, bilateral periorbital ecchymoses, mucous membranes moist  Respiratory: Clear to auscultation bilaterally, respiratory effort normal   Cardiovascular: RRR  Gastrointestinal: Normoactive bowel sounds, soft, nontender, nondistended  Musculoskeletal: No bilateral ankle edema  Psychiatric: Appropriate affect, cooperative  Neurologic: Oriented x 3, strength symmetric in all extremities, Cranial Nerves grossly intact to confrontation, speech clear  Skin: No rashes, multiple abrasions on bilateral upper extremities covered in bandages      Pertinent  and/or Most Recent Results     LAB RESULTS:      Lab 10/23/24  0903 10/22/24  1020   WBC 7.97 9.46   HEMOGLOBIN 13.4 13.8   HEMATOCRIT 40.2 42.4   PLATELETS 209 209   NEUTROS ABS 6.59  --    IMMATURE GRANS (ABS) 0.02  --    LYMPHS ABS 0.57*  --    MONOS ABS 0.73  --    EOS ABS 0.04  --    MCV 94.4 94.4         Lab 10/23/24  0903 10/22/24  1020   SODIUM 134* 135*   POTASSIUM 4.3 4.4   CHLORIDE 100 97*   CO2 25.0 28.3   ANION GAP 9.0 9.7   BUN 23 25*   CREATININE 0.55* 0.72   EGFR 89.4 81.5   GLUCOSE 113* 110*   CALCIUM 9.1 9.7   HEMOGLOBIN A1C  --  5.70*   TSH  --  1.380         Lab  10/23/24  0903 10/22/24  1020   TOTAL PROTEIN 6.7 7.1   ALBUMIN 3.7 4.1   GLOBULIN 3.0 3.0   ALT (SGPT) 16 20   AST (SGOT) 20 21   BILIRUBIN 0.5 0.5   ALK PHOS 69 68             Lab 10/22/24  1020   CHOLESTEROL 186   LDL CHOL 90   HDL CHOL 84*   TRIGLYCERIDES 66             Brief Urine Lab Results  (Last result in the past 365 days)        Color   Clarity   Blood   Leuk Est   Nitrite   Protein   CREAT   Urine HCG        10/22/24 2004 Yellow   Clear   Large (3+)   Trace   Negative   30 mg/dL (1+)                 Microbiology Results (last 10 days)       Procedure Component Value - Date/Time    Respiratory Culture - Sputum, Cough [[id number]] Collected: 10/25/24 1926    Lab Status: Final result Specimen: Sputum from Cough Updated: 10/27/24 0920     Respiratory Culture Light growth (2+) Normal respiratory swapna. No S. aureus or Pseudomonas aeruginosa detected. Final report.     Gram Stain Many (4+) WBCs per low power field      Rare (1+) Epithelial cells per low power field      Moderate (3+) Gram negative cocci, intracellular and extracellular      Few (2+) Gram positive cocci in pairs    Respiratory Panel PCR w/COVID-19(SARS-CoV-2) [facility]/EL[facility]HAK[facility] In-House, NP Swab in UTM/VTM, 2 HR TAT - Swab, Nasopharynx [[id number]]  (Normal) Collected: 10/25/24 1337    Lab Status: Final result Specimen: Swab from Nasopharynx Updated: 10/25/24 1435     ADENOVIRUS, PCR Not Detected     Coronavirus 229E Not Detected     Coronavirus HKU1 Not Detected     Coronavirus NL63 Not Detected     Coronavirus OC43 Not Detected     COVID19 Not Detected     Human Metapneumovirus Not Detected     Human Rhinovirus/Enterovirus Not Detected     Influenza A PCR Not Detected     Influenza B PCR Not Detected     Parainfluenza Virus 1 Not Detected     Parainfluenza Virus 2 Not Detected     Parainfluenza Virus 3 Not Detected     Parainfluenza Virus 4 Not Detected     RSV, PCR Not Detected     Bordetella pertussis pcr Not Detected     Bordetella  parapertussis PCR Not Detected     Chlamydophila pneumoniae PCR Not Detected     Mycoplasma pneumo by PCR Not Detected    Narrative:      In the setting of a positive respiratory panel with a viral infection PLUS a negative procalcitonin without other underlying concern for bacterial infection, consider observing off antibiotics or discontinuation of antibiotics and continue supportive care. If the respiratory panel is positive for atypical bacterial infection (Bordetella pertussis, Chlamydophila pneumoniae, or Mycoplasma pneumoniae), consider antibiotic de-escalation to target atypical bacterial infection.    Respiratory Culture - Sputum, Cough [[id number]] Collected: 10/25/24 1337    Lab Status: Final result Specimen: Sputum from Cough Updated: 10/25/24 1437     Respiratory Culture Rejected     Gram Stain No WBCs per low power field      No Epithelial cells per low power field      Moderate (3+) Gram positive cocci    Narrative:      Specimen rejected due to oropharyngeal contamination. Please reorder and recollect specimen if clinically necessary.            CT Chest Without Contrast Diagnostic    Result Date: 10/22/2024  CT CHEST WO CONTRAST DIAGNOSTIC Date of Exam: 10/22/2024 6:28 PM EDT Indication: Left apical abnormality evaluation seen on cervical CT. Comparison: Same day cervical spine CT Technique: Axial CT images were obtained of the chest without contrast administration.  Reconstructed coronal and sagittal images were also obtained. Automated exposure control and iterative construction methods were used. Findings: Lower Neck: Unremarkable. Hilum and Mediastinum: Moderate-sized hiatal/paraesophageal hernia. Possible mild wall thickening of the distal esophagus. Mild enlargement of the central pulmonary arteries, nonspecific but may represent pulmonary arterial hypertension. Vascular calcifications of the thoracic aorta. Otherwise the visualized vasculature are unremarkable. Normal size heart without  pericardial effusion. No definite lymphadenopathy Lung Parenchyma and Pleura: No pleural effusion or pneumothorax. Multiple scattered focal patchy opacities noted within the lungs bilaterally, predominantly within the upper lobes and left lower lobe.. Within these regions, there are also multiple areas of nodular opacity, the largest of which measures 1.2 cm in the left upper lobe. No consolidation. The central airways are patent. Upper Abdomen: No acute process. Soft tissues: Patient is status post right mastectomy. Otherwise unremarkable Osseous structures: No definite acute osseous abnormality. Severe S-shaped scoliosis. Mild to moderate compression deformities of the T8 and T9 vertebral bodies, most likely chronic.     Impression: Areas of patchy opacities and nodular changes noted within the lungs bilaterally. This is a nonspecific finding but most likely represents a chronic infectious/inflammatory process. Recommend follow-up chest CT in 3 months after resolution of symptoms to  exclude underlying pulmonary lesion/mass. Age indeterminant mild to moderate compression deformities of the T8 and T9 vertebral bodies. These are most likely chronic. Please correlate with point tenderness. Moderate hiatal/paraesophageal hernia. There is apparent wall thickening of the distal esophagus, which may represent esophagitis. If there is clinical concern for an underlying lesion, please consider follow-up with dedicated outpatient endoscopic evaluation. Electronically Signed: Bandar Monge DO  10/22/2024 6:53 PM EDT  Workstation ID: [id number]    CT Head Without Contrast    Result Date: 10/22/2024  CT HEAD WO CONTRAST, CT CERVICAL SPINE WO CONTRAST Date of Exam: 10/22/2024 5:47 PM EDT Indication: Head injury on blood thinners. Comparison: Head CT 6/19/2024 Technique: Axial CT images were obtained of the head and cervical spine without contrast administration.  Automated exposure control and iterative construction methods  were used. Findings: CT HEAD: There is no evidence of acute infarction. There is no acute intracranial hemorrhage. There are no extra-axial collections. Ventricles and CSF spaces are symmetrically prominent. No mass effect nor hydrocephalus. Brain parenchyma appears unchanged.  Paranasal sinuses and mastoid air cells are adequately aerated.  Osseous structures and orbits appear intact. There is a right forehead hematoma. CT CERVICAL SPINE: OSSEOUS STRUCTURES: No fracture nor bony destructive process. ALIGNMENT:  Normal. There is right convex curvature of the mid cervical spine. DISC SPACE: There is moderate intervertebral disc space narrowing and endplate osteophyte formation along the inner margin of the cervical curvature. JOINTS: Advanced facet arthropathy with more mild uncovertebral hypertrophy. There is multilevel facet fusion. SOFT TISSUES: There is a 2.3 cm lower pole right thyroid nodule. There is a 2 cm lower pole left thyroid nodule. No follow-up suggested given patient's age. There is moderate to advanced carotid atherosclerotic disease. LUNG APICES: There are few irregular nodular opacities in the left lung apex potentially infectious. However, neoplasm not excluded. Nonemergent follow-up noncontrast chest CT recommended. LEVELS: No significant osseous central canal nor foraminal stenosis identified at any level.     Impression: 1.Right forehead hematoma. No acute intracranial process is identified. 2.Cervical degenerative changes without acute injury identified. 3.Left apical pulmonary opacities potentially infectious. Nonemergent follow-up noncontrast chest CT recommended. Electronically Signed: Osei Cardona MD  10/22/2024 6:03 PM EDT  Workstation ID: [id number]    CT Cervical Spine Without Contrast    Result Date: 10/22/2024  CT HEAD WO CONTRAST, CT CERVICAL SPINE WO CONTRAST Date of Exam: 10/22/2024 5:47 PM EDT Indication: Head injury on blood thinners. Comparison: Head CT 6/19/2024 Technique: Axial  CT images were obtained of the head and cervical spine without contrast administration.  Automated exposure control and iterative construction methods were used. Findings: CT HEAD: There is no evidence of acute infarction. There is no acute intracranial hemorrhage. There are no extra-axial collections. Ventricles and CSF spaces are symmetrically prominent. No mass effect nor hydrocephalus. Brain parenchyma appears unchanged.  Paranasal sinuses and mastoid air cells are adequately aerated.  Osseous structures and orbits appear intact. There is a right forehead hematoma. CT CERVICAL SPINE: OSSEOUS STRUCTURES: No fracture nor bony destructive process. ALIGNMENT:  Normal. There is right convex curvature of the mid cervical spine. DISC SPACE: There is moderate intervertebral disc space narrowing and endplate osteophyte formation along the inner margin of the cervical curvature. JOINTS: Advanced facet arthropathy with more mild uncovertebral hypertrophy. There is multilevel facet fusion. SOFT TISSUES: There is a 2.3 cm lower pole right thyroid nodule. There is a 2 cm lower pole left thyroid nodule. No follow-up suggested given patient's age. There is moderate to advanced carotid atherosclerotic disease. LUNG APICES: There are few irregular nodular opacities in the left lung apex potentially infectious. However, neoplasm not excluded. Nonemergent follow-up noncontrast chest CT recommended. LEVELS: No significant osseous central canal nor foraminal stenosis identified at any level.     Impression: 1.Right forehead hematoma. No acute intracranial process is identified. 2.Cervical degenerative changes without acute injury identified. 3.Left apical pulmonary opacities potentially infectious. Nonemergent follow-up noncontrast chest CT recommended. Electronically Signed: Osei Cardona MD  10/22/2024 6:03 PM EDT  Workstation ID: [id number]             Results for orders placed during the hospital encounter of 06/19/24    Adult  Transthoracic Echo Complete With Contrast if Necessary Per Protocol    Interpretation Summary    Left ventricular ejection fraction appears to be in the lower limits of normal    Left atrial volume is moderately increased.    The right atrial cavity is borderline dilated.    Moderate aortic valve regurgitation is present.    Mild aortic valve stenosis is present. Aortic valve area is 1.2 cm2.    Peak velocity of the flow distal to the aortic valve is 277 cm/s. Aortic valve maximum pressure gradient is 31 mmHg. Aortic valve mean pressure gradient is 17 mmHg. Aortic valve dimensionless index is 0.39 .    Estimated right ventricular systolic pressure from tricuspid regurgitation is mildly elevated (35-45 mmHg).      Plan for Follow-up of Pending Labs/Results:     Discharge Details        Discharge Medications        New Medications        Instructions Start Date   acetaminophen 325 MG tablet  Commonly known as: TYLENOL   650 mg, Oral, Every 6 Hours PRN      cefuroxime 500 MG tablet  Commonly known as: CEFTIN   500 mg, Oral, Every 12 Hours Scheduled      doxycycline 100 MG capsule  Commonly known as: MONODOX   100 mg, Oral, Every 12 Hours Scheduled      famotidine 20 MG tablet  Commonly known as: PEPCID   20 mg, Oral, 2 Times Daily Before Meals      guaifenesin 100 MG/5ML liquid  Commonly known as: ROBITUSSIN   200 mg, Oral, Every 4 Hours PRN      lactobacillus acidophilus capsule capsule   1 capsule, Oral, Daily   Start Date: October 29, 2024            Continue These Medications        Instructions Start Date   amiodarone 200 MG tablet  Commonly known as: PACERONE   200 mg, Oral, Daily      donepezil 10 MG tablet  Commonly known as: Aricept   10 mg, Oral, Nightly      Eliquis 2.5 MG tablet tablet  Generic drug: apixaban   2.5 mg, Oral, Every 12 Hours Scheduled      lisinopril 10 MG tablet  Commonly known as: PRINIVIL,ZESTRIL   10 mg, Daily      spironolactone 25 MG tablet  Commonly known as: ALDACTONE   25 mg, Oral,  Every Morning               No Known Allergies      Discharge Disposition:  Rehab Facility or Unit (DC - External)    Diet:  Hospital:  Diet Order   Procedures    Diet: Cardiac; Healthy Heart (2-3 Na+); Fluid Consistency: Thin (IDDSI 0)       Diet Instructions       Diet: Cardiac Diets; Healthy Heart (2-3 Na+); Regular (IDDSI 7); Thin (IDDSI 0)      Discharge Diet: Cardiac Diets    Cardiac Diet: Healthy Heart (2-3 Na+)    Texture: Regular (IDDSI 7)    Fluid Consistency: Thin (IDDSI 0)             Activity:      Restrictions or Other Recommendations:       CODE STATUS:    Code Status and Medical Interventions: CPR (Attempt to Resuscitate); Full Support   Ordered at: 10/22/24 2043     Level Of Support Discussed With:    Patient     Code Status (Patient has no pulse and is not breathing):    CPR (Attempt to Resuscitate)     Medical Interventions (Patient has pulse or is breathing):    Full Support       Future Appointments   Date Time Provider Department Center   1/14/2025 11:30 AM Lauryn Mejias APRN [facility]   4/14/2025  3:30 PM Elvi Ventura MD [facility]   5/8/2025  1:30 PM Jay Clemente MD [facility]   10/22/2025  2:15 PM Elvi Ventura MD [facility]       Additional Instructions for the Follow-ups that You Need to Schedule       Call MD With Problems / Concerns   As directed      Please seek medical attention for any of the following: Difficulty breathing, passing out, chest pain, vomiting blood, bloody stools, confusion, strokelike symptoms, and/or any other concerning symptoms    Order Comments: Please seek medical attention for any of the following: Difficulty breathing, passing out, chest pain, vomiting blood, bloody stools, confusion, strokelike symptoms, and/or any other concerning symptoms         Discharge Follow-up with PCP   As directed       Currently Documented PCP:    Elvi Ventura MD    PCP Phone Number:    [phone]     Follow Up Details: Follow-up with  primary care doctor in 5 to 7 days after discharge from facility regarding this hospitalization, chronic disease management, repeat labs (CBC, CMP)        Discharge Follow-up with Specialty: Follow-up for lung nodule clinic in 3 months   As directed      Specialty: Follow-up for lung nodule clinic in 3 months                      Keira Pacheco MD  10/28/24      Time Spent on Discharge:  I spent  37  minutes on this discharge activity which included: face-to-face encounter with the patient, reviewing the data in the system, coordination of the care with the nursing staff as well as consultants, documentation, and entering orders.            Electronically signed by Keira Pacheco MD at 10/28/24 3382

## 2024-10-28 NOTE — CASE MANAGEMENT/SOCIAL WORK
Continued Stay Note  [facility]     Patient Name: Billie Yu  MRN: [id number]  Today's Date: 10/28/2024    Admit Date: 10/22/2024    Plan: [facility]   Discharge Plan       Row Name 10/28/24 1102       Plan    Plan Comments MSW notified that the facility will need a [facility] exempt form signed by the physician for pt before going. MSW assisted the facility in getting the [facility] form, it was signed by the physician, and MSW faxed to the facility liaison, Ingrid.                     Discharge Codes    No documentation.                 Expected Discharge Date and Time       Expected Discharge Date Expected Discharge Time    Oct 28, 2024               ELIZABETH Juan

## 2024-10-28 NOTE — PLAN OF CARE
Problem: Adult Inpatient Plan of Care  Goal: Plan of Care Review  Outcome: Progressing  Goal: Patient-Specific Goal (Individualized)  Outcome: Progressing  Goal: Absence of Hospital-Acquired Illness or Injury  Outcome: Progressing  Intervention: Identify and Manage Fall Risk  Recent Flowsheet Documentation  Taken 10/27/2024 2200 by Bill Harvey RN  Safety Promotion/Fall Prevention:   activity supervised   assistive device/personal items within reach   clutter free environment maintained   fall prevention program maintained   safety round/check completed   room organization consistent  Taken 10/27/2024 2000 by Bill Harvey RN  Safety Promotion/Fall Prevention:   activity supervised   assistive device/personal items within reach   clutter free environment maintained   fall prevention program maintained   safety round/check completed   room organization consistent  Intervention: Prevent Skin Injury  Recent Flowsheet Documentation  Taken 10/27/2024 2200 by Bill Harvey RN  Body Position: supine  Skin Protection:   transparent dressing maintained   skin sealant/moisture barrier applied  Taken 10/27/2024 2000 by Bill Harvey RN  Body Position: supine  Skin Protection:   transparent dressing maintained   skin sealant/moisture barrier applied  Intervention: Prevent and Manage VTE (Venous Thromboembolism) Risk  Recent Flowsheet Documentation  Taken 10/27/2024 2000 by Bill Harvey RN  VTE Prevention/Management:   bilateral   compression stockings off   patient refused intervention  Intervention: Prevent Infection  Recent Flowsheet Documentation  Taken 10/27/2024 2200 by Bill Harvey RN  Infection Prevention: environmental surveillance performed  Taken 10/27/2024 2000 by Bill Harvey RN  Infection Prevention: environmental surveillance performed  Goal: Optimal Comfort and Wellbeing  Outcome: Progressing  Intervention: Provide Person-Centered Care  Recent Flowsheet Documentation  Taken  10/27/2024 2200 by Bill Harvey RN  Trust Relationship/Rapport:   care explained   choices provided   emotional support provided   empathic listening provided   questions answered   questions encouraged   reassurance provided   thoughts/feelings acknowledged  Taken 10/27/2024 2000 by Bill Harvey RN  Trust Relationship/Rapport:   care explained   choices provided   emotional support provided   empathic listening provided   questions answered   questions encouraged   reassurance provided   thoughts/feelings acknowledged  Goal: Readiness for Transition of Care  Outcome: Progressing     Problem: Pain Acute  Goal: Optimal Pain Control and Function  Outcome: Progressing  Intervention: Optimize Psychosocial Wellbeing  Recent Flowsheet Documentation  Taken 10/27/2024 2200 by Bill Harvey RN  Supportive Measures:   active listening utilized   verbalization of feelings encouraged  Taken 10/27/2024 2000 by Bill Harvey RN  Supportive Measures:   active listening utilized   verbalization of feelings encouraged  Intervention: Prevent or Manage Pain  Recent Flowsheet Documentation  Taken 10/27/2024 2200 by Bill Harvey RN  Sleep/Rest Enhancement:   awakenings minimized   noise level reduced   room darkened  Taken 10/27/2024 2000 by Bill Harvey RN  Sleep/Rest Enhancement:   awakenings minimized   noise level reduced   room darkened  Medication Review/Management: medications reviewed     Problem: Fall Injury Risk  Goal: Absence of Fall and Fall-Related Injury  Outcome: Progressing  Intervention: Identify and Manage Contributors  Recent Flowsheet Documentation  Taken 10/27/2024 2200 by Bill Harvey RN  Self-Care Promotion: independence encouraged  Taken 10/27/2024 2000 by Bill Harvey RN  Medication Review/Management: medications reviewed  Self-Care Promotion: independence encouraged  Intervention: Promote Injury-Free Environment  Recent Flowsheet Documentation  Taken 10/27/2024 2200 by  Stamper, Dawnlyn, RN  Safety Promotion/Fall Prevention:   activity supervised   assistive device/personal items within reach   clutter free environment maintained   fall prevention program maintained   safety round/check completed   room organization consistent  Taken 10/27/2024 2000 by Bill Harvey RN  Safety Promotion/Fall Prevention:   activity supervised   assistive device/personal items within reach   clutter free environment maintained   fall prevention program maintained   safety round/check completed   room organization consistent     Problem: Skin Injury Risk Increased  Goal: Skin Health and Integrity  Outcome: Progressing  Intervention: Optimize Skin Protection  Recent Flowsheet Documentation  Taken 10/27/2024 2200 by Bill Harvey RN  Activity Management: activity encouraged  Pressure Reduction Techniques: frequent weight shift encouraged  Head of Bed (HOB) Positioning: HOB elevated  Pressure Reduction Devices: pressure-redistributing mattress utilized  Skin Protection:   transparent dressing maintained   skin sealant/moisture barrier applied  Taken 10/27/2024 2000 by Bill Harvey RN  Activity Management: activity encouraged  Pressure Reduction Techniques: frequent weight shift encouraged  Head of Bed (HOB) Positioning: HOB elevated  Pressure Reduction Devices: pressure-redistributing mattress utilized  Skin Protection:   transparent dressing maintained   skin sealant/moisture barrier applied   Goal Outcome Evaluation:

## 2024-10-28 NOTE — CASE MANAGEMENT/SOCIAL WORK
Case Management Discharge Note      Final Note: Patient's plan is a skilled bed at [facility] today, 10/28.  Nurse to call report to [phone].  CM will fax discharge summary when available to [phone].  [facility] will transport by wheelchair.  They will pick patient up in her room at 1530.         Selected Continued Care - Admitted Since 10/22/2024       Destination Coordination complete.      Service Provider Services Address Phone Fax Patient Preferred    [facility] [address], [facility] [address] [phone] --              Durable Medical Equipment    No services have been selected for the patient.                Dialysis/Infusion    No services have been selected for the patient.                Home Medical Care    No services have been selected for the patient.                Therapy    No services have been selected for the patient.                Community Resources    No services have been selected for the patient.                Community & DME    No services have been selected for the patient.                    Transportation Services  W/C Van: Other ([facility])    Final Discharge Disposition Code: 03 - skilled nursing facility (SNF)

## 2024-10-28 NOTE — DISCHARGE SUMMARY
[facility] Medicine Services  DISCHARGE SUMMARY    Patient Name: iBllie Yu  : 1935  MRN: [id number]    Date of Admission: 10/22/2024  4:25 PM  Date of Discharge: 10/28/2024  Primary Care Physician: Elvi Ventura MD    Consults       No orders found from 2024 to 10/23/2024.            Hospital Course     Presenting Problem: Fall    Active Hospital Problems    Diagnosis  POA    **Fall [W19.XXXA]  Yes    Traumatic hematoma of forehead [S00.83XA]  Unknown    Generalized weakness [R53.1]  Unknown    Hard of hearing [H91.90]  Unknown    PAF (paroxysmal atrial fibrillation) [I48.0]  Yes    Memory impairment [R41.3]  Yes      Resolved Hospital Problems   No resolved problems to display.          Hospital Course:  Billie Yu is a 88 y.o. female with significant medical history for paroxysmal atrial fibrillation, chronic anticoagulation, syncope who presented to [facility] after a fall at home sustaining a head injury.      This patient's problems and plans were partially entered by my partner and updated as appropriate by me 10/28/24.     Mechanical fall with head injury  Generalized weakness  -CT head showed right forehead hematoma, otherwise nonacute  -PT/OT evaluated, recommended IPR on discharge.  Patient amenable.  Discharged to rehab today.     Cough with congestion  -symptoms present prior to admission  -resp viral PCR negative, respiratory culture w normal resp swapna (finalized)  -Cefuroxime, doxy to complete a total of 5 days   -probiotic     Nodular changes bilateral lungs  -CT chest showed patchy opacities and nodular changes within lungs bilaterally, may represent chronic infectious/inflammatory process.  -Follow-up chest CT in 3 months to exclude underlying pulmonary lesion/mass.  My practice partner discussed with son on 10/25.      Hiatal / paraesophageal hernia  -CT chest showed wall thickening of distal esophagus, may represent esophagitis.     -Consider outpatient endoscopic evaluation if any symptoms occur.  -Pepcid     HTN- continue lisinopril, spironolactone.   Atrial fibrillation- continue amiodarone, Eliquis.  Dementia- continue donepezil.  Hard of hearing- Patient wears hearing aids, not present on admission.      Discharge Follow Up Recommendations for outpatient labs/diagnostics:  Follow-up with primary care doctor in 5 to 7 days after discharge from facility regarding this hospitalization, chronic disease management, repeat labs (CBC, CMP)  Follow-up for lung nodule clinic in 3 months    Day of Discharge     HPI:   Patient reports feeling well today.  No headache.  No facial pain.  No visual changes.  Eating and drinking well.  No chest pain or shortness of breath.  Has a mild cough but is improved.  On room air now.  No focal weakness.  No numbness or tingling.    Review of Systems  As above    Vital Signs:   Temp:  [98 °F (36.7 °C)-98.4 °F (36.9 °C)] 98 °F (36.7 °C)  Heart Rate:  [69] 69  Resp:  [16-18] 16  BP: (147-167)/(67-77) 156/77      Physical Exam:  Constitutional: No acute distress, awake, alert  HENT: NC, bilateral periorbital ecchymoses, mucous membranes moist  Respiratory: Clear to auscultation bilaterally, respiratory effort normal   Cardiovascular: RRR  Gastrointestinal: Normoactive bowel sounds, soft, nontender, nondistended  Musculoskeletal: No bilateral ankle edema  Psychiatric: Appropriate affect, cooperative  Neurologic: Oriented x 3, strength symmetric in all extremities, Cranial Nerves grossly intact to confrontation, speech clear  Skin: No rashes, multiple abrasions on bilateral upper extremities covered in bandages      Pertinent  and/or Most Recent Results     LAB RESULTS:      Lab 10/23/24  0903 10/22/24  1020   WBC 7.97 9.46   HEMOGLOBIN 13.4 13.8   HEMATOCRIT 40.2 42.4   PLATELETS 209 209   NEUTROS ABS 6.59  --    IMMATURE GRANS (ABS) 0.02  --    LYMPHS ABS 0.57*  --    MONOS ABS 0.73  --    EOS ABS 0.04  --    MCV  94.4 94.4         Lab 10/23/24  0903 10/22/24  1020   SODIUM 134* 135*   POTASSIUM 4.3 4.4   CHLORIDE 100 97*   CO2 25.0 28.3   ANION GAP 9.0 9.7   BUN 23 25*   CREATININE 0.55* 0.72   EGFR 89.4 81.5   GLUCOSE 113* 110*   CALCIUM 9.1 9.7   HEMOGLOBIN A1C  --  5.70*   TSH  --  1.380         Lab 10/23/24  0903 10/22/24  1020   TOTAL PROTEIN 6.7 7.1   ALBUMIN 3.7 4.1   GLOBULIN 3.0 3.0   ALT (SGPT) 16 20   AST (SGOT) 20 21   BILIRUBIN 0.5 0.5   ALK PHOS 69 68             Lab 10/22/24  1020   CHOLESTEROL 186   LDL CHOL 90   HDL CHOL 84*   TRIGLYCERIDES 66             Brief Urine Lab Results  (Last result in the past 365 days)        Color   Clarity   Blood   Leuk Est   Nitrite   Protein   CREAT   Urine HCG        10/22/24 2004 Yellow   Clear   Large (3+)   Trace   Negative   30 mg/dL (1+)                 Microbiology Results (last 10 days)       Procedure Component Value - Date/Time    Respiratory Culture - Sputum, Cough [[id number]] Collected: 10/25/24 1926    Lab Status: Final result Specimen: Sputum from Cough Updated: 10/27/24 0920     Respiratory Culture Light growth (2+) Normal respiratory swapna. No S. aureus or Pseudomonas aeruginosa detected. Final report.     Gram Stain Many (4+) WBCs per low power field      Rare (1+) Epithelial cells per low power field      Moderate (3+) Gram negative cocci, intracellular and extracellular      Few (2+) Gram positive cocci in pairs    Respiratory Panel PCR w/COVID-19(SARS-CoV-2) [facility] In-House, NP Swab in UTM/VTM, 2 HR TAT - Swab, Nasopharynx [[id number]]  (Normal) Collected: 10/25/24 1337    Lab Status: Final result Specimen: Swab from Nasopharynx Updated: 10/25/24 1435     ADENOVIRUS, PCR Not Detected     Coronavirus 229E Not Detected     Coronavirus HKU1 Not Detected     Coronavirus NL63 Not Detected     Coronavirus OC43 Not Detected     COVID19 Not Detected     Human Metapneumovirus Not Detected     Human Rhinovirus/Enterovirus Not Detected     Influenza  A PCR Not Detected     Influenza B PCR Not Detected     Parainfluenza Virus 1 Not Detected     Parainfluenza Virus 2 Not Detected     Parainfluenza Virus 3 Not Detected     Parainfluenza Virus 4 Not Detected     RSV, PCR Not Detected     Bordetella pertussis pcr Not Detected     Bordetella parapertussis PCR Not Detected     Chlamydophila pneumoniae PCR Not Detected     Mycoplasma pneumo by PCR Not Detected    Narrative:      In the setting of a positive respiratory panel with a viral infection PLUS a negative procalcitonin without other underlying concern for bacterial infection, consider observing off antibiotics or discontinuation of antibiotics and continue supportive care. If the respiratory panel is positive for atypical bacterial infection (Bordetella pertussis, Chlamydophila pneumoniae, or Mycoplasma pneumoniae), consider antibiotic de-escalation to target atypical bacterial infection.    Respiratory Culture - Sputum, Cough [[id number]] Collected: 10/25/24 1337    Lab Status: Final result Specimen: Sputum from Cough Updated: 10/25/24 1437     Respiratory Culture Rejected     Gram Stain No WBCs per low power field      No Epithelial cells per low power field      Moderate (3+) Gram positive cocci    Narrative:      Specimen rejected due to oropharyngeal contamination. Please reorder and recollect specimen if clinically necessary.            CT Chest Without Contrast Diagnostic    Result Date: 10/22/2024  CT CHEST WO CONTRAST DIAGNOSTIC Date of Exam: 10/22/2024 6:28 PM EDT Indication: Left apical abnormality evaluation seen on cervical CT. Comparison: Same day cervical spine CT Technique: Axial CT images were obtained of the chest without contrast administration.  Reconstructed coronal and sagittal images were also obtained. Automated exposure control and iterative construction methods were used. Findings: Lower Neck: Unremarkable. Hilum and Mediastinum: Moderate-sized hiatal/paraesophageal hernia. Possible mild  wall thickening of the distal esophagus. Mild enlargement of the central pulmonary arteries, nonspecific but may represent pulmonary arterial hypertension. Vascular calcifications of the thoracic aorta. Otherwise the visualized vasculature are unremarkable. Normal size heart without pericardial effusion. No definite lymphadenopathy Lung Parenchyma and Pleura: No pleural effusion or pneumothorax. Multiple scattered focal patchy opacities noted within the lungs bilaterally, predominantly within the upper lobes and left lower lobe.. Within these regions, there are also multiple areas of nodular opacity, the largest of which measures 1.2 cm in the left upper lobe. No consolidation. The central airways are patent. Upper Abdomen: No acute process. Soft tissues: Patient is status post right mastectomy. Otherwise unremarkable Osseous structures: No definite acute osseous abnormality. Severe S-shaped scoliosis. Mild to moderate compression deformities of the T8 and T9 vertebral bodies, most likely chronic.     Impression: Areas of patchy opacities and nodular changes noted within the lungs bilaterally. This is a nonspecific finding but most likely represents a chronic infectious/inflammatory process. Recommend follow-up chest CT in 3 months after resolution of symptoms to  exclude underlying pulmonary lesion/mass. Age indeterminant mild to moderate compression deformities of the T8 and T9 vertebral bodies. These are most likely chronic. Please correlate with point tenderness. Moderate hiatal/paraesophageal hernia. There is apparent wall thickening of the distal esophagus, which may represent esophagitis. If there is clinical concern for an underlying lesion, please consider follow-up with dedicated outpatient endoscopic evaluation. Electronically Signed: Bandar Monge DO  10/22/2024 6:53 PM EDT  Workstation ID: [id number]    CT Head Without Contrast    Result Date: 10/22/2024  CT HEAD WO CONTRAST, CT CERVICAL SPINE WO  CONTRAST Date of Exam: 10/22/2024 5:47 PM EDT Indication: Head injury on blood thinners. Comparison: Head CT 6/19/2024 Technique: Axial CT images were obtained of the head and cervical spine without contrast administration.  Automated exposure control and iterative construction methods were used. Findings: CT HEAD: There is no evidence of acute infarction. There is no acute intracranial hemorrhage. There are no extra-axial collections. Ventricles and CSF spaces are symmetrically prominent. No mass effect nor hydrocephalus. Brain parenchyma appears unchanged.  Paranasal sinuses and mastoid air cells are adequately aerated.  Osseous structures and orbits appear intact. There is a right forehead hematoma. CT CERVICAL SPINE: OSSEOUS STRUCTURES: No fracture nor bony destructive process. ALIGNMENT:  Normal. There is right convex curvature of the mid cervical spine. DISC SPACE: There is moderate intervertebral disc space narrowing and endplate osteophyte formation along the inner margin of the cervical curvature. JOINTS: Advanced facet arthropathy with more mild uncovertebral hypertrophy. There is multilevel facet fusion. SOFT TISSUES: There is a 2.3 cm lower pole right thyroid nodule. There is a 2 cm lower pole left thyroid nodule. No follow-up suggested given patient's age. There is moderate to advanced carotid atherosclerotic disease. LUNG APICES: There are few irregular nodular opacities in the left lung apex potentially infectious. However, neoplasm not excluded. Nonemergent follow-up noncontrast chest CT recommended. LEVELS: No significant osseous central canal nor foraminal stenosis identified at any level.     Impression: 1.Right forehead hematoma. No acute intracranial process is identified. 2.Cervical degenerative changes without acute injury identified. 3.Left apical pulmonary opacities potentially infectious. Nonemergent follow-up noncontrast chest CT recommended. Electronically Signed: Osei Cardona MD   10/22/2024 6:03 PM EDT  Workstation ID: [id number]    CT Cervical Spine Without Contrast    Result Date: 10/22/2024  CT HEAD WO CONTRAST, CT CERVICAL SPINE WO CONTRAST Date of Exam: 10/22/2024 5:47 PM EDT Indication: Head injury on blood thinners. Comparison: Head CT 6/19/2024 Technique: Axial CT images were obtained of the head and cervical spine without contrast administration.  Automated exposure control and iterative construction methods were used. Findings: CT HEAD: There is no evidence of acute infarction. There is no acute intracranial hemorrhage. There are no extra-axial collections. Ventricles and CSF spaces are symmetrically prominent. No mass effect nor hydrocephalus. Brain parenchyma appears unchanged.  Paranasal sinuses and mastoid air cells are adequately aerated.  Osseous structures and orbits appear intact. There is a right forehead hematoma. CT CERVICAL SPINE: OSSEOUS STRUCTURES: No fracture nor bony destructive process. ALIGNMENT:  Normal. There is right convex curvature of the mid cervical spine. DISC SPACE: There is moderate intervertebral disc space narrowing and endplate osteophyte formation along the inner margin of the cervical curvature. JOINTS: Advanced facet arthropathy with more mild uncovertebral hypertrophy. There is multilevel facet fusion. SOFT TISSUES: There is a 2.3 cm lower pole right thyroid nodule. There is a 2 cm lower pole left thyroid nodule. No follow-up suggested given patient's age. There is moderate to advanced carotid atherosclerotic disease. LUNG APICES: There are few irregular nodular opacities in the left lung apex potentially infectious. However, neoplasm not excluded. Nonemergent follow-up noncontrast chest CT recommended. LEVELS: No significant osseous central canal nor foraminal stenosis identified at any level.     Impression: 1.Right forehead hematoma. No acute intracranial process is identified. 2.Cervical degenerative changes without acute injury identified.  3.Left apical pulmonary opacities potentially infectious. Nonemergent follow-up noncontrast chest CT recommended. Electronically Signed: Osei Cardona MD  10/22/2024 6:03 PM EDT  Workstation ID: [id number]             Results for orders placed during the hospital encounter of 06/19/24    Adult Transthoracic Echo Complete With Contrast if Necessary Per Protocol    Interpretation Summary    Left ventricular ejection fraction appears to be in the lower limits of normal    Left atrial volume is moderately increased.    The right atrial cavity is borderline dilated.    Moderate aortic valve regurgitation is present.    Mild aortic valve stenosis is present. Aortic valve area is 1.2 cm2.    Peak velocity of the flow distal to the aortic valve is 277 cm/s. Aortic valve maximum pressure gradient is 31 mmHg. Aortic valve mean pressure gradient is 17 mmHg. Aortic valve dimensionless index is 0.39 .    Estimated right ventricular systolic pressure from tricuspid regurgitation is mildly elevated (35-45 mmHg).      Plan for Follow-up of Pending Labs/Results:     Discharge Details        Discharge Medications        New Medications        Instructions Start Date   acetaminophen 325 MG tablet  Commonly known as: TYLENOL   650 mg, Oral, Every 6 Hours PRN      cefuroxime 500 MG tablet  Commonly known as: CEFTIN   500 mg, Oral, Every 12 Hours Scheduled      doxycycline 100 MG capsule  Commonly known as: MONODOX   100 mg, Oral, Every 12 Hours Scheduled      famotidine 20 MG tablet  Commonly known as: PEPCID   20 mg, Oral, 2 Times Daily Before Meals      guaifenesin 100 MG/5ML liquid  Commonly known as: ROBITUSSIN   200 mg, Oral, Every 4 Hours PRN      lactobacillus acidophilus capsule capsule   1 capsule, Oral, Daily   Start Date: October 29, 2024            Continue These Medications        Instructions Start Date   amiodarone 200 MG tablet  Commonly known as: PACERONE   200 mg, Oral, Daily      donepezil 10 MG tablet  Commonly known  as: Aricept   10 mg, Oral, Nightly      Eliquis 2.5 MG tablet tablet  Generic drug: apixaban   2.5 mg, Oral, Every 12 Hours Scheduled      lisinopril 10 MG tablet  Commonly known as: PRINIVIL,ZESTRIL   10 mg, Daily      spironolactone 25 MG tablet  Commonly known as: ALDACTONE   25 mg, Oral, Every Morning               No Known Allergies      Discharge Disposition:  Rehab Facility or Unit (DC - External)    Diet:  Hospital:  Diet Order   Procedures    Diet: Cardiac; Healthy Heart (2-3 Na+); Fluid Consistency: Thin (IDDSI 0)       Diet Instructions       Diet: Cardiac Diets; Healthy Heart (2-3 Na+); Regular (IDDSI 7); Thin (IDDSI 0)      Discharge Diet: Cardiac Diets    Cardiac Diet: Healthy Heart (2-3 Na+)    Texture: Regular (IDDSI 7)    Fluid Consistency: Thin (IDDSI 0)             Activity:      Restrictions or Other Recommendations:       CODE STATUS:    Code Status and Medical Interventions: CPR (Attempt to Resuscitate); Full Support   Ordered at: 10/22/24 2043     Level Of Support Discussed With:    Patient     Code Status (Patient has no pulse and is not breathing):    CPR (Attempt to Resuscitate)     Medical Interventions (Patient has pulse or is breathing):    Full Support       Future Appointments   Date Time Provider Department Center   1/14/2025 11:30 AM Lauryn Mejias APRN [facility]   4/14/2025  3:30 PM Elvi Ventura MD [facility]   5/8/2025  1:30 PM Jay Clemente MD [facility]   10/22/2025  2:15 PM Elvi Ventura MD [facility]       Additional Instructions for the Follow-ups that You Need to Schedule       Call MD With Problems / Concerns   As directed      Please seek medical attention for any of the following: Difficulty breathing, passing out, chest pain, vomiting blood, bloody stools, confusion, strokelike symptoms, and/or any other concerning symptoms    Order Comments: Please seek medical attention for any of the following: Difficulty breathing, passing out,  chest pain, vomiting blood, bloody stools, confusion, strokelike symptoms, and/or any other concerning symptoms         Discharge Follow-up with PCP   As directed       Currently Documented PCP:    Elvi Ventura MD    PCP Phone Number:    [phone]     Follow Up Details: Follow-up with primary care doctor in 5 to 7 days after discharge from facility regarding this hospitalization, chronic disease management, repeat labs (CBC, CMP)        Discharge Follow-up with Specialty: Follow-up for lung nodule clinic in 3 months   As directed      Specialty: Follow-up for lung nodule clinic in 3 months                      Keira Pacheco MD  10/28/24      Time Spent on Discharge:  I spent  37  minutes on this discharge activity which included: face-to-face encounter with the patient, reviewing the data in the system, coordination of the care with the nursing staff as well as consultants, documentation, and entering orders.

## 2024-11-04 ENCOUNTER — TELEPHONE (OUTPATIENT)
Dept: INTERNAL MEDICINE | Facility: CLINIC | Age: 89
End: 2024-11-04
Payer: MEDICARE

## 2024-11-04 NOTE — TELEPHONE ENCOUNTER
Caller: nathan garcía     Relationship: Provider    Best call back number: [phone]     What orders are you requesting (i.e. lab or imaging): VERBAL TO FOLLOW HOME HEALT H

## 2024-11-11 RX ORDER — ARIPIPRAZOLE 5 MG/1
5 TABLET ORAL DAILY
Qty: 30 TABLET | Refills: 3 | Status: SHIPPED | OUTPATIENT
Start: 2024-11-11

## 2024-11-18 PROBLEM — R93.89 ABNORMAL CT OF THE CHEST: Status: ACTIVE | Noted: 2024-11-18

## 2024-11-18 NOTE — PROGRESS NOTES
Billie Yu is a 88 y.o. female here for evaluation of   Chief Complaint   Patient presents with    Abnormal CT Scan       Problem list:  Left upper lobe nodule, 1.2 cm  Patchy bilateral lung opacities  Paraesophageal hernia  Syncope  Moderate AI, mild AS  Rotoscoliosis, congenital  Breast cancer post right mastectomy  Paroxysmal atrial fibrillation, chronic anticoagulation, amiodarone  Dementia  Posttraumatic stress disorder  Narcissistic personality disorder  Fall with traumatic hematoma of the forehead  Hearing loss  Hypertension  Osteopenia with compression fractures  Scoliosis  Bladder augmentation, prolapse  Right mastectomy  Left hip open reduction internal fixation, 2022  Hysterectomy  Left wrist arthroplasty  Right cataract surgery  No tobacco  No known drug allergies    History of Present Illness    88-year-old woman, non-smoker with dementia, paroxysmal atrial fibrillation on chronic anticoagulation, hypertension, previous mastectomy for breast cancer, hospitalized October 22, 2024 after a fall with a forehead hematoma.  During that admission she had a productive cough and underwent a CTA of the chest that revealed some patchy opacities and nodular changes.  She was also noted to have a paraesophageal hernia. She was treated with antibiotics. She then went to rehab for a week. She is now in assisted living. Currently has nasal drainage and a cough. Cough is clear. Cough is not worse at night. Denies reflux symptoms. Denies dysphagia. She is from [place] and moved to [place] to be close to her son. Breast cancer age 29 yrs. She has stopped doing mammogram on left.  Denies exposure to tb.      Review of Systems   Constitutional:  Negative for unexpected weight change.   HENT:  Positive for congestion and postnasal drip. Negative for trouble swallowing.    Eyes:         Glasses   Respiratory:  Positive for cough. Negative for wheezing.    Cardiovascular:  Negative for leg swelling.   Gastrointestinal:   Positive for constipation.   Endocrine: Positive for cold intolerance.   Genitourinary: Negative.    Musculoskeletal:  Positive for gait problem.   Skin: Negative.    Allergic/Immunologic: Negative.    Neurological:  Positive for syncope.   Hematological:  Bruises/bleeds easily.   Psychiatric/Behavioral:  Positive for decreased concentration.          Current Outpatient Medications:     acetaminophen (TYLENOL) 325 MG tablet, Take 2 tablets by mouth Every 6 (Six) Hours As Needed for Mild Pain., Disp: , Rfl:     amiodarone (PACERONE) 200 MG tablet, Take 1 tablet by mouth Daily., Disp: 90 tablet, Rfl: 3    ARIPiprazole (ABILIFY) 5 MG tablet, Take 1 tablet by mouth Daily., Disp: 30 tablet, Rfl: 3    donepezil (Aricept) 10 MG tablet, Take 1 tablet by mouth Every Night., Disp: 90 tablet, Rfl: 3    Eliquis 2.5 MG tablet tablet, Take 1 tablet by mouth Every 12 (Twelve) Hours., Disp: 180 tablet, Rfl: 3    famotidine (PEPCID) 20 MG tablet, Take 1 tablet by mouth 2 (Two) Times a Day Before Meals., Disp: , Rfl:     guaifenesin (ROBITUSSIN) 100 MG/5ML liquid, Take 10 mL by mouth Every 4 (Four) Hours As Needed for Cough., Disp: , Rfl:     lactobacillus acidophilus (RISAQUAD) capsule capsule, Take 1 capsule by mouth Daily., Disp: , Rfl:     lisinopril (PRINIVIL,ZESTRIL) 10 MG tablet, Take 1 tablet by mouth Daily., Disp: , Rfl:     spironolactone (ALDACTONE) 25 MG tablet, Take 1 tablet by mouth Every Morning., Disp: 90 tablet, Rfl: 0    Past Medical History:   Diagnosis Date    Atrial fibrillation     Cataract     Dementia 2022    Difficulty walking 2022    HL (hearing loss)     Hypertension     Memory loss     Osteopenia     Scoliosis      Past Surgical History:   Procedure Laterality Date    BLADDER AUGMENTATION      BREAST SURGERY      HIP FRACTURE SURGERY Left 2022    HYSTERECTOMY      MASTECTOMY Right     WRIST ARTHROPLASTY Left      Social History     Socioeconomic History    Marital status:    Tobacco Use    Smoking  "status: Never    Smokeless tobacco: Never   Vaping Use    Vaping status: Never Used   Substance and Sexual Activity    Alcohol use: Yes     Alcohol/week: 14.0 standard drinks of alcohol     Types: 10 Glasses of wine, 4 Drinks containing 0.5 oz of alcohol per week    Drug use: Never    Sexual activity: Not Currently     Partners: Male     Birth control/protection: Vasectomy     Family History   Problem Relation Age of Onset    Stroke Mother     Hyperlipidemia Father     Hypertension Father     Heart disease Father     Hiatal hernia Father     Heart disease Brother     Hyperlipidemia Brother     Hypertension Brother      Blood pressure 110/52, pulse 72, temperature 96.2 °F (35.7 °C), height 152.4 cm (60\"), weight 48.6 kg (107 lb 3.2 oz), SpO2 96%.    Physical Exam  Constitutional:       General: She is not in acute distress.  HENT:      Head: Normocephalic.      Comments: Bruising face bilaterally     Nose: Congestion present.      Mouth/Throat:      Mouth: Mucous membranes are moist.      Pharynx: Oropharynx is clear. No oropharyngeal exudate.   Eyes:      Conjunctiva/sclera: Conjunctivae normal.      Comments: Pupils pin point   Cardiovascular:      Rate and Rhythm: Normal rate. Rhythm irregular.      Heart sounds: Murmur heard.      Comments: Irregular, with decreased heart sounds, systolic and diastolic murmers  Abdominal:      General: Bowel sounds are normal.   Musculoskeletal:      Right lower leg: Edema present.      Left lower leg: Edema present.      Comments: Severe rotoscoliosis   Lymphadenopathy:      Cervical: No cervical adenopathy.   Skin:     Findings: Bruising present.   Neurological:      Mental Status: She is alert and oriented to person, place, and time.         PFTs:    Radiology:    Findings: CT scan of the chest October 22, 2024  Lower Neck: Unremarkable.     Hilum and Mediastinum: Moderate-sized hiatal/paraesophageal hernia. Possible mild wall thickening of the distal esophagus. Mild " enlargement of the central pulmonary arteries, nonspecific but may represent pulmonary arterial hypertension. Vascular   calcifications of the thoracic aorta. Otherwise the visualized vasculature are unremarkable.  Normal size heart without pericardial effusion.  No definite lymphadenopathy     Lung Parenchyma and Pleura: No pleural effusion or pneumothorax. Multiple scattered focal patchy opacities noted within the lungs bilaterally, predominantly within the upper lobes and left lower lobe.. Within these regions, there are also multiple areas   of nodular opacity, the largest of which measures 1.2 cm in the left upper lobe.  No consolidation.  The central airways are patent.     Upper Abdomen: No acute process.      Soft tissues: Patient is status post right mastectomy. Otherwise unremarkable     Osseous structures: No definite acute osseous abnormality. Severe S-shaped scoliosis. Mild to moderate compression deformities of the T8 and T9 vertebral bodies, most likely chronic.              IMPRESSION:  Impression:  Areas of patchy opacities and nodular changes noted within the lungs bilaterally. This is a nonspecific finding but most likely represents a chronic infectious/inflammatory process. Recommend follow-up chest CT in 3 months after resolution of symptoms to   exclude underlying pulmonary lesion/mass.     Age indeterminant mild to moderate compression deformities of the T8 and T9 vertebral bodies. These are most likely chronic. Please correlate with point tenderness.     Moderate hiatal/paraesophageal hernia. There is apparent wall thickening of the distal esophagus, which may represent esophagitis. If there is clinical concern for an underlying lesion, please consider follow-up with dedicated outpatient endoscopic   evaluation.           Electronically Signed: Bandar Monge DO    10/22/2024 6:53 PM EDT     Interpretation Summary echocardiogram June 20, 2024         Left ventricular ejection fraction  appears to be in the lower limits of normal    Left atrial volume is moderately increased.    The right atrial cavity is borderline dilated.    Moderate aortic valve regurgitation is present.    Mild aortic valve stenosis is present. Aortic valve area is 1.2 cm2.    Peak velocity of the flow distal to the aortic valve is 277 cm/s. Aortic valve maximum pressure gradient is 31 mmHg. Aortic valve mean pressure gradient is 17 mmHg. Aortic valve dimensionless index is 0.39 .    Estimated right ventricular systolic pressure from tricuspid regurgitation is mildly elevated (35-45 mmHg).      Lab:    October 23, 2024, glucose 113, BUN 23, creatinine 0.55, sodium 134, potassium 4.3, chloride 100, bicarbonate 25, calcium 9.1, total protein 6.7, albumin 3.7, ALT 16, AST 20, alk phos 69, bilirubin 0.5, white count 7.9, hemoglobin 13.4, platelet count 209, 82% polys, 7% lymphocytes, hemoglobin A1c 5.7    Diagnoses and all orders for this visit:    1. Abnormal CT of the chest (Primary)        Discussion:     88-year-old woman, non-smoker undergoing a CT of the chest after a fall and found to have patchy bilateral opacities.  She was treated empirically for infection with antibiotics.  I am clinically suspicious that she may have silent reflux and aspiration from her large paraesophageal hernia.  She was placed on Pepcid in October with that admission.  She does not have elevated eosinophils or exposure history to suggest an eosinophilic pneumonia.  Her resting room air saturation is excellent at 96%.  She is not having fever or night sweats.  She did recently moved here from [place] and is at risk for developing histoplasmosis.I would expect to see more of a lobar process with some adenopathy rather than patchy bilateral disease.  She has developed a recurrent cough that she attributes to postnasal drip.  She adamantly denies dysphagia.  However with her neck and spine I have my concerns that she may have some dysphagia.  She  absolutely does not want any procedure done unless it is a life-threatening emergency.  At this time we will not perform any aggressive procedures and monitor over time.    -COVID-vaccine, flu vaccine, Tdap all given in October 2024    CT scan of the chest at the end of January with subsequent follow-up      Alexandra Khan MD

## 2024-11-19 ENCOUNTER — OFFICE VISIT (OUTPATIENT)
Dept: PULMONOLOGY | Facility: CLINIC | Age: 89
End: 2024-11-19
Payer: MEDICARE

## 2024-11-19 VITALS
HEIGHT: 60 IN | BODY MASS INDEX: 21.05 KG/M2 | WEIGHT: 107.2 LBS | DIASTOLIC BLOOD PRESSURE: 52 MMHG | HEART RATE: 72 BPM | SYSTOLIC BLOOD PRESSURE: 110 MMHG | OXYGEN SATURATION: 96 % | TEMPERATURE: 96.2 F

## 2024-11-19 DIAGNOSIS — R93.89 ABNORMAL CT OF THE CHEST: Primary | ICD-10-CM

## 2024-11-19 PROCEDURE — 99203 OFFICE O/P NEW LOW 30 MIN: CPT | Performed by: INTERNAL MEDICINE

## 2024-11-19 NOTE — LETTER
November 19, 2024     Elvi Ventura MD  [address] [place] [address]    Patient: Billie Yu   YOB: 1935   Date of Visit: 11/19/2024     Dear Dr. Lorenzo MD:    Thank you for referring Billie Yu to me for evaluation. Below are the relevant portions of my assessment and plan of care.    If you have questions, please do not hesitate to call me. I look forward to following Billie along with you.         Sincerely,        Alexandra Khan MD        CC: No Recipients      Progress Notes:  Billie Yu is a 88 y.o. female here for evaluation of   Chief Complaint   Patient presents with   • Abnormal CT Scan       Problem list:  Left upper lobe nodule, 1.2 cm  Patchy bilateral lung opacities  Paraesophageal hernia  Syncope  Moderate AI, mild AS  Rotoscoliosis, congenital  Breast cancer post right mastectomy  Paroxysmal atrial fibrillation, chronic anticoagulation, amiodarone  Dementia  Posttraumatic stress disorder  Narcissistic personality disorder  Fall with traumatic hematoma of the forehead  Hearing loss  Hypertension  Osteopenia with compression fractures  Scoliosis  Bladder augmentation, prolapse  Right mastectomy  Left hip open reduction internal fixation, 2022  Hysterectomy  Left wrist arthroplasty  Right cataract surgery  No tobacco  No known drug allergies    History of Present Illness    88-year-old woman, non-smoker with dementia, paroxysmal atrial fibrillation on chronic anticoagulation, hypertension, previous mastectomy for breast cancer, hospitalized October 22, 2024 after a fall with a forehead hematoma.  During that admission she had a productive cough and underwent a CTA of the chest that revealed some patchy opacities and nodular changes.  She was also noted to have a paraesophageal hernia. She was treated with antibiotics. She then went to rehab for a week. She is now in assisted living. Currently has nasal drainage and a cough. Cough is clear. Cough is not worse at  night. Denies reflux symptoms. Denies dysphagia. She is from [place] and moved to [place] to be close to her son. Breast cancer age 29 yrs. She has stopped doing mammogram on left.  Denies exposure to tb.      Review of Systems   Constitutional:  Negative for unexpected weight change.   HENT:  Positive for congestion and postnasal drip. Negative for trouble swallowing.    Eyes:         Glasses   Respiratory:  Positive for cough. Negative for wheezing.    Cardiovascular:  Negative for leg swelling.   Gastrointestinal:  Positive for constipation.   Endocrine: Positive for cold intolerance.   Genitourinary: Negative.    Musculoskeletal:  Positive for gait problem.   Skin: Negative.    Allergic/Immunologic: Negative.    Neurological:  Positive for syncope.   Hematological:  Bruises/bleeds easily.   Psychiatric/Behavioral:  Positive for decreased concentration.          Current Outpatient Medications:   •  acetaminophen (TYLENOL) 325 MG tablet, Take 2 tablets by mouth Every 6 (Six) Hours As Needed for Mild Pain., Disp: , Rfl:   •  amiodarone (PACERONE) 200 MG tablet, Take 1 tablet by mouth Daily., Disp: 90 tablet, Rfl: 3  •  ARIPiprazole (ABILIFY) 5 MG tablet, Take 1 tablet by mouth Daily., Disp: 30 tablet, Rfl: 3  •  donepezil (Aricept) 10 MG tablet, Take 1 tablet by mouth Every Night., Disp: 90 tablet, Rfl: 3  •  Eliquis 2.5 MG tablet tablet, Take 1 tablet by mouth Every 12 (Twelve) Hours., Disp: 180 tablet, Rfl: 3  •  famotidine (PEPCID) 20 MG tablet, Take 1 tablet by mouth 2 (Two) Times a Day Before Meals., Disp: , Rfl:   •  guaifenesin (ROBITUSSIN) 100 MG/5ML liquid, Take 10 mL by mouth Every 4 (Four) Hours As Needed for Cough., Disp: , Rfl:   •  lactobacillus acidophilus (RISAQUAD) capsule capsule, Take 1 capsule by mouth Daily., Disp: , Rfl:   •  lisinopril (PRINIVIL,ZESTRIL) 10 MG tablet, Take 1 tablet by mouth Daily., Disp: , Rfl:   •  spironolactone (ALDACTONE) 25 MG tablet, Take 1 tablet by mouth Every Morning.,  "Disp: 90 tablet, Rfl: 0    Past Medical History:   Diagnosis Date   • Atrial fibrillation    • Cataract    • Dementia 2022   • Difficulty walking 2022   • HL (hearing loss)    • Hypertension    • Memory loss    • Osteopenia    • Scoliosis      Past Surgical History:   Procedure Laterality Date   • BLADDER AUGMENTATION     • BREAST SURGERY     • HIP FRACTURE SURGERY Left 2022   • HYSTERECTOMY     • MASTECTOMY Right    • WRIST ARTHROPLASTY Left      Social History     Socioeconomic History   • Marital status:    Tobacco Use   • Smoking status: Never   • Smokeless tobacco: Never   Vaping Use   • Vaping status: Never Used   Substance and Sexual Activity   • Alcohol use: Yes     Alcohol/week: 14.0 standard drinks of alcohol     Types: 10 Glasses of wine, 4 Drinks containing 0.5 oz of alcohol per week   • Drug use: Never   • Sexual activity: Not Currently     Partners: Male     Birth control/protection: Vasectomy     Family History   Problem Relation Age of Onset   • Stroke Mother    • Hyperlipidemia Father    • Hypertension Father    • Heart disease Father    • Hiatal hernia Father    • Heart disease Brother    • Hyperlipidemia Brother    • Hypertension Brother      Blood pressure 110/52, pulse 72, temperature 96.2 °F (35.7 °C), height 152.4 cm (60\"), weight 48.6 kg (107 lb 3.2 oz), SpO2 96%.    Physical Exam  Constitutional:       General: She is not in acute distress.  HENT:      Head: Normocephalic.      Comments: Bruising face bilaterally     Nose: Congestion present.      Mouth/Throat:      Mouth: Mucous membranes are moist.      Pharynx: Oropharynx is clear. No oropharyngeal exudate.   Eyes:      Conjunctiva/sclera: Conjunctivae normal.      Comments: Pupils pin point   Cardiovascular:      Rate and Rhythm: Normal rate. Rhythm irregular.      Heart sounds: Murmur heard.      Comments: Irregular, with decreased heart sounds, systolic and diastolic murmers  Abdominal:      General: Bowel sounds are normal. "   Musculoskeletal:      Right lower leg: Edema present.      Left lower leg: Edema present.      Comments: Severe rotoscoliosis   Lymphadenopathy:      Cervical: No cervical adenopathy.   Skin:     Findings: Bruising present.   Neurological:      Mental Status: She is alert and oriented to person, place, and time.         PFTs:    Radiology:    Findings: CT scan of the chest October 22, 2024  Lower Neck: Unremarkable.     Hilum and Mediastinum: Moderate-sized hiatal/paraesophageal hernia. Possible mild wall thickening of the distal esophagus. Mild enlargement of the central pulmonary arteries, nonspecific but may represent pulmonary arterial hypertension. Vascular   calcifications of the thoracic aorta. Otherwise the visualized vasculature are unremarkable.  Normal size heart without pericardial effusion.  No definite lymphadenopathy     Lung Parenchyma and Pleura: No pleural effusion or pneumothorax. Multiple scattered focal patchy opacities noted within the lungs bilaterally, predominantly within the upper lobes and left lower lobe.. Within these regions, there are also multiple areas   of nodular opacity, the largest of which measures 1.2 cm in the left upper lobe.  No consolidation.  The central airways are patent.     Upper Abdomen: No acute process.      Soft tissues: Patient is status post right mastectomy. Otherwise unremarkable     Osseous structures: No definite acute osseous abnormality. Severe S-shaped scoliosis. Mild to moderate compression deformities of the T8 and T9 vertebral bodies, most likely chronic.              IMPRESSION:  Impression:  Areas of patchy opacities and nodular changes noted within the lungs bilaterally. This is a nonspecific finding but most likely represents a chronic infectious/inflammatory process. Recommend follow-up chest CT in 3 months after resolution of symptoms to   exclude underlying pulmonary lesion/mass.     Age indeterminant mild to moderate compression deformities of  the T8 and T9 vertebral bodies. These are most likely chronic. Please correlate with point tenderness.     Moderate hiatal/paraesophageal hernia. There is apparent wall thickening of the distal esophagus, which may represent esophagitis. If there is clinical concern for an underlying lesion, please consider follow-up with dedicated outpatient endoscopic   evaluation.           Electronically Signed: Bandar Monge DO    10/22/2024 6:53 PM EDT     Interpretation Summary echocardiogram June 20, 2024       •  Left ventricular ejection fraction appears to be in the lower limits of normal  •  Left atrial volume is moderately increased.  •  The right atrial cavity is borderline dilated.  •  Moderate aortic valve regurgitation is present.  •  Mild aortic valve stenosis is present. Aortic valve area is 1.2 cm2.  •  Peak velocity of the flow distal to the aortic valve is 277 cm/s. Aortic valve maximum pressure gradient is 31 mmHg. Aortic valve mean pressure gradient is 17 mmHg. Aortic valve dimensionless index is 0.39 .  •  Estimated right ventricular systolic pressure from tricuspid regurgitation is mildly elevated (35-45 mmHg).      Lab:    October 23, 2024, glucose 113, BUN 23, creatinine 0.55, sodium 134, potassium 4.3, chloride 100, bicarbonate 25, calcium 9.1, total protein 6.7, albumin 3.7, ALT 16, AST 20, alk phos 69, bilirubin 0.5, white count 7.9, hemoglobin 13.4, platelet count 209, 82% polys, 7% lymphocytes, hemoglobin A1c 5.7    Diagnoses and all orders for this visit:    1. Abnormal CT of the chest (Primary)        Discussion:     88-year-old woman, non-smoker undergoing a CT of the chest after a fall and found to have patchy bilateral opacities.  She was treated empirically for infection with antibiotics.  I am clinically suspicious that she may have silent reflux and aspiration from her large paraesophageal hernia.  She was placed on Pepcid in October with that admission.  She does not have elevated  eosinophils or exposure history to suggest an eosinophilic pneumonia.  Her resting room air saturation is excellent at 96%.  She is not having fever or night sweats.  She did recently moved here from [place] and is at risk for developing histoplasmosis.I would expect to see more of a lobar process with some adenopathy rather than patchy bilateral disease.  She has developed a recurrent cough that she attributes to postnasal drip.  She adamantly denies dysphagia.  However with her neck and spine I have my concerns that she may have some dysphagia.  She absolutely does not want any procedure done unless it is a life-threatening emergency.  At this time we will not perform any aggressive procedures and monitor over time.    -COVID-vaccine, flu vaccine, Tdap all given in October 2024    CT scan of the chest at the end of January with subsequent follow-up      Alexandra Khan MD

## 2025-01-13 ENCOUNTER — TELEPHONE (OUTPATIENT)
Dept: NEUROLOGY | Facility: CLINIC | Age: 89
End: 2025-01-13
Payer: MEDICARE

## 2025-01-13 NOTE — TELEPHONE ENCOUNTER
Spoke with son, Calin. Notified BJ is out of the office with the Flu and he was super sympathetic. It did appear the 1st available was in March. He took it and if she has any work in slots before then, please give him a call. Thanks!

## 2025-02-12 ENCOUNTER — HOSPITAL ENCOUNTER (OUTPATIENT)
Dept: CT IMAGING | Facility: HOSPITAL | Age: 89
Discharge: HOME | End: 2025-02-12
Admitting: INTERNAL MEDICINE
Payer: MEDICARE

## 2025-02-12 DIAGNOSIS — R93.89 ABNORMAL CT OF THE CHEST: ICD-10-CM

## 2025-02-12 PROCEDURE — 71250 CT THORAX DX C-: CPT

## 2025-02-18 ENCOUNTER — TELEPHONE (OUTPATIENT)
Dept: NEUROLOGY | Facility: CLINIC | Age: 89
End: 2025-02-18
Payer: MEDICARE
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
## 2025-02-20 ENCOUNTER — OFFICE VISIT (OUTPATIENT)
Dept: NEUROLOGY | Facility: CLINIC | Age: 89
End: 2025-02-20
Payer: MEDICARE

## 2025-02-20 ENCOUNTER — OFFICE VISIT (OUTPATIENT)
Dept: PULMONOLOGY | Facility: CLINIC | Age: 89
End: 2025-02-20
Payer: MEDICARE

## 2025-02-20 VITALS
HEART RATE: 79 BPM | WEIGHT: 107 LBS | DIASTOLIC BLOOD PRESSURE: 70 MMHG | SYSTOLIC BLOOD PRESSURE: 136 MMHG | BODY MASS INDEX: 21.01 KG/M2 | OXYGEN SATURATION: 94 % | HEIGHT: 60 IN

## 2025-02-20 VITALS
HEART RATE: 70 BPM | BODY MASS INDEX: 21.01 KG/M2 | WEIGHT: 107 LBS | OXYGEN SATURATION: 94 % | DIASTOLIC BLOOD PRESSURE: 70 MMHG | SYSTOLIC BLOOD PRESSURE: 122 MMHG | HEIGHT: 60 IN

## 2025-02-20 DIAGNOSIS — G45.9 TIA (TRANSIENT ISCHEMIC ATTACK): ICD-10-CM

## 2025-02-20 DIAGNOSIS — R93.89 ABNORMAL CT OF THE CHEST: Primary | ICD-10-CM

## 2025-02-20 DIAGNOSIS — F02.B4: Primary | ICD-10-CM

## 2025-02-20 RX ORDER — ARIPIPRAZOLE 5 MG/1
10 TABLET ORAL DAILY
Qty: 60 TABLET | Refills: 6 | Status: SHIPPED | OUTPATIENT
Start: 2025-02-20

## 2025-02-20 NOTE — LETTER
[facility]  Vaccine Consent Form    Patient Name:  Billie Yu  Patient :  1935     Vaccine(s) Ordered    Pneumococcal Conjugate Vaccine 20-Valent (PCV20)        Screening Checklist  The following questions should be completed prior to vaccination. If you answer “yes” to any question, it does not necessarily mean you should not be vaccinated. It just means we may need to clarify or ask more questions. If a question is unclear, please ask your healthcare provider to explain it.    Yes No   Any fever or moderate to severe illness today (mild illness and/or antibiotic treatment are not contraindications)?     Do you have a history of a serious reaction to any previous vaccinations, such as anaphylaxis, encephalopathy within 7 days, Guillain-[place] syndrome within 6 weeks, seizure?     Have you received any live vaccine(s) (e.g MMR, BOBBI) or any other vaccines in the last month (to ensure duplicate doses aren't given)?     Do you have an anaphylactic allergy to latex (DTaP, DTaP-IPV, Hep A, Hep B, MenB, RV, Td, Tdap), baker’s yeast (Hep B, HPV), polysorbates (RSV, nirsevimab, PCV 20, Rotavirrus, Tdap, Shingrix), or gelatin (BOBBI, MMR)?     Do you have an anaphylactic allergy to neomycin (Rabies, BOBBI, MMR, IPV, Hep A), polymyxin B (IPV), or streptomycin (IPV)?      Any cancer, leukemia, AIDS, or other immune system disorder? (BOBBI, MMR, RV)     Do you have a parent, brother, or sister with an immune system problem (if immune competence of vaccine recipient clinically verified, can proceed)? (MMR, BOBBI)     Any recent steroid treatments for >2 weeks, chemotherapy, or radiation treatment? (BOBBI, MMR)     Have you received antibody-containing blood transfusions or IVIG in the past 11 months (recommended interval is dependent on product)? (MMR, BOBBI)     Have you taken antiviral drugs (acyclovir, famciclovir, valacyclovir for BOBBI) in the last 24 or 48 hours, respectively?      Are you pregnant or planning to  "become pregnant within 1 month? (BOBBI, MMR, HPV, IPV, MenB, Abrexvy; For Hep B- refer to Engerix-B; For RSV - Abrysvo is indicated for 32-36 weeks of pregnancy from September to January)     For infants, have you ever been told your child has had intussusception or a medical emergency involving obstruction of the intestine (Rotavirus)? If not for an infant, can skip this question.         *Ordering Physicians/APC should be consulted if \"yes\" is checked by the patient or guardian above.  I have received, read, and understand the Vaccine Information Statement (VIS) for each vaccine ordered.  I have considered my or my child's health status as well as the health status of my close contacts.  I have taken the opportunity to discuss my vaccine questions with my or my child's health care provider.   I have requested that the ordered vaccine(s) be given to me or my child.  I understand the benefits and risks of the vaccines.  I understand that I should remain in the clinic for 15 minutes after receiving the vaccine(s).  _________________________________________________________  Signature of Patient or Parent/Legal Guardian ____________________  Date     "

## 2025-02-20 NOTE — PROGRESS NOTES
[facility] Pulmonary Follow up    CHIEF COMPLAINT    Abnormal CT of the chest    HISTORY OF PRESENT ILLNESS    HPI:   Billie Yu is a 89 y.o.female followed by pulmonary regarding an abnormal CT of the chest.     Patient establish care with our clinic in November 2024 evaluated by Dr. Dr. Khan.    She was hospitalized in the fall 2024 after a fall and underwent CTA of the chest that showed some patchy opacities and nodular changes and empirically completed a course of antibiotics.  Largest lesion measures 1.2 cm in the left upper lobe.  Was also noted to have a moderate paraesophageal hernia and severe scoliosis.    Does have a persistent cough that she attributed to postnasal drip.  Largely denied any concern for GERD or dysphagia.    She is originally from [place] and relocated to [place] to be closer to family.    History of breast cancer in her 20s and underwent right mastectomy.    History of atrial fibrillation and mild valvular disease chronically anticoagulated on Eliquis.    She is currently residing in [place].     Interval history:   Patient was last seen in the office in November and is here today to discuss her serial CT results.    Has done well from a respiratory standpoint since she was seen last.  Her biggest complaint at today's visit is some right sided sciatica pain which is chronic for her.    She is accompanied by her son.    Denies recent ED visits, hospitalizations, or exacerbations.     Denies fever, chills, night sweats, or hemoptysis. No recent sick contacts. No chest pain or palpitations. Denies lower extremity swelling or calf tenderness.     Patient Active Problem List   Diagnosis    Syncope    PAF (paroxysmal atrial fibrillation)    Chronic anticoagulation    Narcissistic personality disorder    PTSD (post-traumatic stress disorder)    Memory impairment    Snoring    Hx of breast cancer    Other constipation    Fall    Traumatic hematoma of forehead    Generalized  weakness    Hard of hearing    Prediabetes    Abnormal CT of the chest       No Known Allergies    Current Outpatient Medications:     acetaminophen (TYLENOL) 325 MG tablet, Take 2 tablets by mouth Every 6 (Six) Hours As Needed for Mild Pain., Disp: , Rfl:     amiodarone (PACERONE) 200 MG tablet, Take 1 tablet by mouth Daily., Disp: 90 tablet, Rfl: 3    donepezil (Aricept) 10 MG tablet, Take 1 tablet by mouth Every Night., Disp: 90 tablet, Rfl: 3    Eliquis 2.5 MG tablet tablet, Take 1 tablet by mouth Every 12 (Twelve) Hours., Disp: 180 tablet, Rfl: 3    famotidine (PEPCID) 20 MG tablet, Take 1 tablet by mouth 2 (Two) Times a Day Before Meals., Disp: , Rfl:     guaifenesin (ROBITUSSIN) 100 MG/5ML liquid, Take 10 mL by mouth Every 4 (Four) Hours As Needed for Cough., Disp: , Rfl:     lactobacillus acidophilus (RISAQUAD) capsule capsule, Take 1 capsule by mouth Daily., Disp: , Rfl:     lisinopril (PRINIVIL,ZESTRIL) 10 MG tablet, Take 1 tablet by mouth Daily., Disp: , Rfl:     spironolactone (ALDACTONE) 25 MG tablet, Take 1 tablet by mouth Every Morning., Disp: 90 tablet, Rfl: 0    ARIPiprazole (ABILIFY) 5 MG tablet, Take 2 tablets by mouth Daily., Disp: 60 tablet, Rfl: 6  MEDICATION LIST AND ALLERGIES REVIEWED.    Social History     Tobacco Use    Smoking status: Never    Smokeless tobacco: Never   Vaping Use    Vaping status: Never Used   Substance Use Topics    Alcohol use: Yes     Alcohol/week: 14.0 standard drinks of alcohol     Types: 10 Glasses of wine, 4 Drinks containing 0.5 oz of alcohol per week    Drug use: Never       FAMILY AND SOCIAL HISTORY REVIEWED.    Review of Systems   Constitutional:  Negative for chills, fatigue and fever.   Respiratory:  Negative for cough, chest tightness, shortness of breath and wheezing.    Cardiovascular:  Negative for chest pain, palpitations and leg swelling.   Skin:  Negative for color change.   Psychiatric/Behavioral:  Negative for sleep disturbance.    All other systems  "reviewed and are negative.      /70   Pulse 79   Ht 152.4 cm (60\")   Wt 48.5 kg (107 lb)   SpO2 94%   BMI 20.90 kg/m²     Immunization History   Administered Date(s) Administered    COVID-19 (PFIZER) 12YRS+ (COMIRNATY) 10/14/2024    Fluzone High-Dose 65+YRS 10/14/2024    Pneumococcal Conjugate 20-Valent (PCV20) 02/20/2025    Tdap 10/28/2024       Physical Exam  Vitals reviewed.   Constitutional:       General: She is not in acute distress.     Appearance: Normal appearance.      Comments: Elderly female   Cardiovascular:      Rate and Rhythm: Normal rate and regular rhythm.      Pulses: Normal pulses.      Heart sounds: Normal heart sounds.   Pulmonary:      Effort: Pulmonary effort is normal. No respiratory distress.      Breath sounds: No wheezing, rhonchi or rales.   Musculoskeletal:      Comments: Thoracic scoliosis   Skin:     General: Skin is warm and dry.   Neurological:      General: No focal deficit present.      Mental Status: She is alert and oriented to person, place, and time.         RESULTS    PFTs:  None today    Imaging:   CT Chest Without Contrast Diagnostic  Result Date: 2/17/2025  Impression: Interval resolution of bilateral upper lobe pneumonia, with small areas of residual non-mass-like scarring. No new or progressive chest pathology. Large hiatal hernia again noted. Electronically Signed: Max Ramachandran MD  2/17/2025 9:30 AM EST  Workstation ID: [id number]     Cardiac:   Results for orders placed during the hospital encounter of 06/19/24  Adult Transthoracic Echo Complete With Contrast if Necessary Per Protocol  Interpretation Summary    Left ventricular ejection fraction appears to be in the lower limits of normal    Left atrial volume is moderately increased.    The right atrial cavity is borderline dilated.    Moderate aortic valve regurgitation is present.    Mild aortic valve stenosis is present. Aortic valve area is 1.2 cm2.    Peak velocity of the flow distal to the aortic valve is " 277 cm/s. Aortic valve maximum pressure gradient is 31 mmHg. Aortic valve mean pressure gradient is 17 mmHg. Aortic valve dimensionless index is 0.39 .    Estimated right ventricular systolic pressure from tricuspid regurgitation is mildly elevated (35-45 mmHg).     PROBLEM LIST    Problem List Items Addressed This Visit       Abnormal CT of the chest - Primary     DISCUSSION    Ms. Yu was seen today in follow-up to discuss her serial CT imaging which showed interval resolution of the bilateral upper lobe pneumonia.  She does have some residual scarring which is chronic.    Discussed possible etiology of her pneumonia including dysphagia or gastric reflux in the setting of her large paraesophageal hernia and significant thoracic scoliosis.  She largely denies any symptoms of either and wishes to defer workup at this time.  She is on Pepcid.  Briefly discussed consideration of gastric reflux precautions and she does not think she has this and is doing quite well for her age.    Overall she feels that her breathing is doing quite well.    She is now residing at [place] and her son states this will be a permanent arrangement.    Does have a history of atrial fibrillation chronically anticoagulated on Eliquis and she denies any missed doses.  SpO2 today is 94% on room air.    At this point I will see her back in 1 year or she may see me sooner for a sick visit.  CXR at that time.    I personally spent a total of 31 minutes on patient visit today including chart review, face to face with the patient obtaining the history and physical exam, review of pertinent images and tests, counseling and discussion and/or coordination of care as described above, and documentation.  Total time excludes time spent on other separate services such as performing procedures or test interpretation, if applicable.    Electronically signed by FAROOQ Tate, 02/20/25, 4:04 PM EST.    Please note that portions of this note were  completed with a voice recognition program.      CC: Elvi Ventura MD

## 2025-02-20 NOTE — Clinical Note
[facility]  Vaccine Consent Form    Patient Name:  Billie Yu  Patient :  1935     Vaccine(s) Ordered    Pneumococcal Conjugate Vaccine 20-Valent (PCV20)        Screening Checklist  The following questions should be completed prior to vaccination. If you answer “yes” to any question, it does not necessarily mean you should not be vaccinated. It just means we may need to clarify or ask more questions. If a question is unclear, please ask your healthcare provider to explain it.    Yes No   1. Any fever or moderate to severe illness today (mild illness and/or antibiotic treatment are not contraindications)?     2. Do you have a history of a serious reaction to any previous vaccinations, such as anaphylaxis, encephalopathy within 7 days, Guillain-[place] syndrome within 6 weeks, seizure?     3. Have you received any live vaccine(s) (e.g MMR, BOBBI) or any other vaccines in the last month (to ensure duplicate doses aren't given)?     4. Do you have an anaphylactic allergy to latex (DTaP, DTaP-IPV, Hep A, Hep B, MenB, RV, Td, Tdap), baker’s yeast (Hep B, HPV), polysorbates (RSV, nirsevimab, PCV 20, Rotavirrus, Tdap, Shingrix), or gelatin (BOBBI, MMR)?     5. Do you have an anaphylactic allergy to neomycin (Rabies, BOBBI, MMR, IPV, Hep A), polymyxin B (IPV), or streptomycin (IPV)?      6. Any cancer, leukemia, AIDS, or other immune system disorder? (BOBBI, MMR, RV)     7. Do you have a parent, brother, or sister with an immune system problem (if immune competence of vaccine recipient clinically verified, can proceed)? (MMR, BOBBI)     8. Any recent steroid treatments for >2 weeks, chemotherapy, or radiation treatment? (BOBBI, MMR)     9. Have you received antibody-containing blood transfusions or IVIG in the past 11 months (recommended interval is dependent on product)? (MMR, BOBBI)     10. Have you taken antiviral drugs (acyclovir, famciclovir, valacyclovir for BOBBI) in the last 24 or 48 hours, respectively?     "  11. Are you pregnant or planning to become pregnant within 1 month? (BOBBI, MMR, HPV, IPV, MenB, Abrexvy; For Hep B- refer to Engerix-B; For RSV - Abrysvo is indicated for 32-36 weeks of pregnancy from September to January)     12. For infants, have you ever been told your child has had intussusception or a medical emergency involving obstruction of the intestine (Rotavirus)? If not for an infant, can skip this question.         *Ordering Physicians/APC should be consulted if \"yes\" is checked by the patient or guardian above.  • I have received, read, and understand the Vaccine Information Statement (VIS) for each vaccine ordered.  • I have considered my or my child's health status as well as the health status of my close contacts.  • I have taken the opportunity to discuss my vaccine questions with my or my child's health care provider.   • I have requested that the ordered vaccine(s) be given to me or my child.  • I understand the benefits and risks of the vaccines.  • I understand that I should remain in the clinic for 15 minutes after receiving the vaccine(s).  _________________________________________________________  Signature of Patient or Parent/Legal Guardian ____________________  Date     "

## 2025-02-20 NOTE — PROGRESS NOTES
Neuro Office Visit      Encounter Date: 2025   Patient Name: Billie Yu  : 1935   MRN: [id number]   PCP:  Elvi Ventura MD     Chief Complaint:    Chief Complaint   Patient presents with    Cognitive and behavioral changes       History of Present Illness: Billie Yu is a 89 y.o. female who is here today in Neurology for dementia.    Last visit with me on 10/10/2024, referred for neuropsych testing, Abilify started, continued donepezil.    Accompanied by her son who assists with history.  History of Present Illness  Reports a general sense of well-being, with the exception of persistent right hip pain.     Resides in an assisted living facility, [place], since November.     In  she suffered a fall which resulted in facial injuries and subsequently spent a week at [facility] following hospitalization she went to rehab for a week before moving to [place]. Since her hospitalization at [facility] Calin has noticed a significant worsening of dementia symptoms.    Shares an apartment with her .     Maintains a level of independence, managing her own breakfast preparations and often attending social events within the facility. Participates in various activities such as bingo, discussions, and memory exercises.    Expresses a desire for increased physical activity but acknowledges the limitation imposed by hip discomfort.     Experiences occasional nervousness, manifested as hand tremors, predominantly in the mornings. These tremors do not interfere with the ability to grasp objects or perform daily tasks.     Reports no excessive feelings of agitation or irritation.     No changes in medication regimen since the last visit.     Has a history of falls, including one incident where she tripped over a yard light, resulting in a facial injury.     Reports experiencing both short-term and long-term memory loss, with a greater degree of forgetfulness for events that occurred in the  "distant past. Describes having a good memory of things.    Marcy's son, Calin asked to speak privately.  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    He previously told me that she had been thrown out of several restaurants due to her aggressive behavior and attitude.    Calin says that since she has been at [facility] he has watched her be very verbally abusive to the staff.  She is also verbally abusive to the family.  This has been a long-term ongoing issue.  He did notice some slight improvement on Abilify and wonders if we can increase the dose.    He also has concerns that Marcy may be having TIAs.  He notes that left side of her face seems to be \"slack\" at times and she often drools out of the corner of her mouth.    Calin would like to consider further testing to evaluate for blockages that could be responsible for potential TIA.  Long discussion with him regarding how aggressive he would want the treatment to be given Marcy's dementia and age.  At this point she is on Eliquis which should decrease the potential for stroke.  At this time he would like to proceed with CTA of the head and neck.  Depending on those results we can discuss further intervention if needed.    Marcy is a DNR and Calin does not think they would want significant intervention.            SOCIAL HISTORY    Occupations: Human resources  Hobbies: Bingo, scrabble, attending events at the facility, ice skating, roller skating, sewing  Alcohol: Participates in wine happy hours on Wednesdays and Fridays  Coffee/Tea/Caffeine-containing Drinks: Drinks coffee      MEDICATIONS  Current: donepezil, Abilify    CT Head Without Contrast (10/22/2024 17:57)     Subjective      Past Medical History:   Past Medical History:   Diagnosis Date    Atrial fibrillation     Cataract     Dementia 2022    Difficulty walking 2022    HL (hearing loss)     Hypertension     Memory loss     Osteopenia  "    Scoliosis        Past Surgical History:   Past Surgical History:   Procedure Laterality Date    BLADDER AUGMENTATION      BREAST SURGERY      HIP FRACTURE SURGERY Left 2022    HYSTERECTOMY      MASTECTOMY Right     WRIST ARTHROPLASTY Left        Family History:   Family History   Problem Relation Age of Onset    Stroke Mother     Hyperlipidemia Father     Hypertension Father     Heart disease Father     Hiatal hernia Father     Heart disease Brother     Hyperlipidemia Brother     Hypertension Brother        Social History:   Social History     Socioeconomic History    Marital status:    Tobacco Use    Smoking status: Never    Smokeless tobacco: Never   Vaping Use    Vaping status: Never Used   Substance and Sexual Activity    Alcohol use: Yes     Alcohol/week: 14.0 standard drinks of alcohol     Types: 10 Glasses of wine, 4 Drinks containing 0.5 oz of alcohol per week    Drug use: Never    Sexual activity: Not Currently     Partners: Male     Birth control/protection: Vasectomy       Medications:     Current Outpatient Medications:     acetaminophen (TYLENOL) 325 MG tablet, Take 2 tablets by mouth Every 6 (Six) Hours As Needed for Mild Pain., Disp: , Rfl:     amiodarone (PACERONE) 200 MG tablet, Take 1 tablet by mouth Daily., Disp: 90 tablet, Rfl: 3    ARIPiprazole (ABILIFY) 5 MG tablet, Take 2 tablets by mouth Daily., Disp: 60 tablet, Rfl: 6    donepezil (Aricept) 10 MG tablet, Take 1 tablet by mouth Every Night., Disp: 90 tablet, Rfl: 3    Eliquis 2.5 MG tablet tablet, Take 1 tablet by mouth Every 12 (Twelve) Hours., Disp: 180 tablet, Rfl: 3    famotidine (PEPCID) 20 MG tablet, Take 1 tablet by mouth 2 (Two) Times a Day Before Meals., Disp: , Rfl:     guaifenesin (ROBITUSSIN) 100 MG/5ML liquid, Take 10 mL by mouth Every 4 (Four) Hours As Needed for Cough., Disp: , Rfl:     lactobacillus acidophilus (RISAQUAD) capsule capsule, Take 1 capsule by mouth Daily., Disp: , Rfl:     lisinopril (PRINIVIL,ZESTRIL)  10 MG tablet, Take 1 tablet by mouth Daily., Disp: , Rfl:     spironolactone (ALDACTONE) 25 MG tablet, Take 1 tablet by mouth Every Morning., Disp: 90 tablet, Rfl: 0    Allergies:   No Known Allergies    PHQ-9 Total Score:     STEADI Fall Risk Assessment was completed, and patient is at HIGH risk for falls. Assessment completed on:2/20/2025    Objective     Physical Exam:     Neurological Exam  Mental Status  Awake and drowsy. Oriented only to person and place. Speech is normal. Language is fluent with no aphasia.    Cranial Nerves  CN II: Visual acuity is normal.  CN III, IV, VI: Extraocular movements intact bilaterally. Pupils equal round and reactive to light bilaterally.  CN V: Facial sensation is normal.  CN VII: Full and symmetric facial movement.  CN IX, X: Palate elevates symmetrically  CN XI: Shoulder shrug strength is normal.  CN XII: Tongue midline without atrophy or fasciculations.    Motor  Normal muscle bulk throughout. No fasciculations present. Normal muscle tone. Strength is 5/5 throughout all four extremities.    Sensory  Sensation is intact to light touch, pinprick, vibration and proprioception in all four extremities.    Reflexes                                            Right                      Left  Brachioradialis                    2+                         2+  Biceps                                 2+                         2+  Triceps                                2+                         2+  Finger flex                           2+                         2+  Hamstring                            2+                         2+  Patellar                                2+                         2+  Achilles                                2+                         2+    Coordination    Finger-to-nose, rapid alternating movements and heel-to-shin normal bilaterally without dysmetria.    Gait  Casual gait: Narrow stance. Reduced stride length. Unable to rise from chair without using  "arms.  Uses rollator for ambulation.        Vital Signs:   Vitals:    02/20/25 1457   BP: 122/70   Pulse: 70   SpO2: 94%   Weight: 48.5 kg (107 lb)   Height: 152.4 cm (60\")     Body mass index is 20.9 kg/m².     Results:   Results      Radiology: Brain MRI/MRA, impression of dementia       Imaging:   CT Chest Without Contrast Diagnostic    Result Date: 2/17/2025  Impression: Interval resolution of bilateral upper lobe pneumonia, with small areas of residual non-mass-like scarring. No new or progressive chest pathology. Large hiatal hernia again noted. Electronically Signed: Max Ramachandran MD  2/17/2025 9:30 AM EST  Workstation ID: [id number]       Labs:   No results found for: \"CMP\", \"PROTEIN\", \"ANTIMOGAB\", \"BSZDSX3UIAC\", \"JCVRESULT\", \"QUANTTBGOLD\", \"CBCDIF\", \"IGGALBSER\"     Assessment / Plan      Assessment/Plan:   Diagnoses and all orders for this visit:    1. Moderate dementia associated with other underlying disease, with anxiety (Primary)  Comments:  Continue donepezil  Increase Abilify    2. TIA (transient ischemic attack)  -     CT Angiogram Head; Future  -     CT Angiogram Neck With & Without Contrast; Future    Other orders  -     ARIPiprazole (ABILIFY) 5 MG tablet; Take 2 tablets by mouth Daily.  Dispense: 60 tablet; Refill: 6         Assessment & Plan  1. Dementia.  Her weight has remained stable since the previous visit, with a slight decrease from 110 to 107. She reports no significant issues with her current medications. The dosage of Abilify will be increased slightly to help manage her symptoms.    2. Right hip pain.  She continues to experience pain in her right hip following a fall.    3. Hand tremors.  She reports occasional hand tremors, particularly in the morning. No issues with dropping items or difficulty reaching for objects were noted.    Patient Education:     Reviewed medications, potential side effects and signs and symptoms to report. Discussed risk versus benefits of treatment plan with " patient and/or family-including medications, labs and radiology that may be ordered. Addressed questions and concerns during visit. Patient and/or family verbalized understanding and agree with plan. Instructed to call the office with any questions and report to ER with any life-threatening symptoms.     Follow Up:   Return in about 3 months (around 5/20/2025).    I spent 60 minutes caring for Billie on this date of service. This time includes time spent by me in the following activities: preparing for the visit, reviewing tests, performing a medically appropriate examination and/or evaluation, counseling and educating the patient/family/caregiver, documenting information in the medical record, independently interpreting results and communicating that information with the patient/family/caregiver, and ordering medications.        During this visit the following were done:  Labs Reviewed []    Labs Ordered []    Radiology Reports Reviewed [x]    Radiology Ordered []    PCP Records Reviewed []    Referring Provider Records Reviewed []    ER Records Reviewed []    Hospital Records Reviewed [x]    History Obtained From Family []    Radiology Images Reviewed [x]    Other Reviewed []    Records Requested []      Patient or patient representative verbalized consent for the use of Ambient Listening during the visit with  FAROOQ Tapia for chart documentation. 2/20/2025  17:12 EST    FAROOQ Tapia   [facility] [place] NEURO CENTER [facility] NEUROLOGY  [address] [place] [address]  [facility] [id number] [phone]

## 2025-03-06 RX ORDER — ARIPIPRAZOLE 10 MG/1
10 TABLET ORAL DAILY
Qty: 30 TABLET | Refills: 6 | Status: SHIPPED | OUTPATIENT
Start: 2025-03-06

## 2025-03-11 ENCOUNTER — TELEPHONE (OUTPATIENT)
Dept: INTERNAL MEDICINE | Facility: CLINIC | Age: 89
End: 2025-03-11

## 2025-03-11 NOTE — TELEPHONE ENCOUNTER
Caller: CARL    Relationship: [facility]    Best call back number:[phone]    What orders are you requesting (i.e. lab or imaging): URINALYSIS    In what timeframe would the patient need to come in: AS SOON AS POSSIBLE        Additional notes: PATIENT IS CONFUSED

## 2025-03-14 ENCOUNTER — HOSPITAL ENCOUNTER (OUTPATIENT)
Dept: CT IMAGING | Facility: HOSPITAL | Age: 89
Discharge: HOME | End: 2025-03-14
Payer: MEDICARE

## 2025-03-14 DIAGNOSIS — G45.9 TIA (TRANSIENT ISCHEMIC ATTACK): ICD-10-CM

## 2025-03-14 PROCEDURE — 70498 CT ANGIOGRAPHY NECK: CPT

## 2025-03-14 PROCEDURE — 25510000001 IOPAMIDOL PER 1 ML: Performed by: NURSE PRACTITIONER

## 2025-03-14 RX ORDER — IOPAMIDOL 755 MG/ML
100 INJECTION, SOLUTION INTRAVASCULAR
Status: COMPLETED | OUTPATIENT
Start: 2025-03-14 | End: 2025-03-14

## 2025-03-14 RX ADMIN — IOPAMIDOL 75 ML: 755 INJECTION, SOLUTION INTRAVENOUS at 15:38

## 2025-03-18 ENCOUNTER — TELEPHONE (OUTPATIENT)
Dept: INTERNAL MEDICINE | Facility: CLINIC | Age: 89
End: 2025-03-18
Payer: MEDICARE

## 2025-03-18 NOTE — TELEPHONE ENCOUNTER
Notified patients son that it was not humberto who ordered this and it was a doctor from the facility his mother is in. Patient states he was mainly confused because she is not under primary care for them. Advised patient to reach out to the facility that his mother is in to ask them further questions since I am unfamiliar with how this facility works. Patients son verbalized understanding and states he will contact them and let us know if she needs to be seen by us.

## 2025-04-07 ENCOUNTER — HOSPITAL ENCOUNTER (OUTPATIENT)
Dept: CARDIOLOGY | Facility: HOSPITAL | Age: 89
Discharge: HOME | End: 2025-04-07
Admitting: INTERNAL MEDICINE
Payer: MEDICARE

## 2025-04-07 VITALS — WEIGHT: 105.82 LBS | BODY MASS INDEX: 20.78 KG/M2 | HEIGHT: 60 IN

## 2025-04-07 DIAGNOSIS — I35.0 AORTIC VALVE STENOSIS, ETIOLOGY OF CARDIAC VALVE DISEASE UNSPECIFIED: ICD-10-CM

## 2025-04-07 PROCEDURE — 93306 TTE W/DOPPLER COMPLETE: CPT

## 2025-04-09 LAB
AORTIC DIMENSIONLESS INDEX: 0.36 (DI)
ASCENDING AORTA: 2.8 CM
AV MEAN PRESS GRAD SYS DOP V1V2: 18.2 MMHG
AV VMAX SYS DOP: 294.4 CM/SEC
BH CV ECHO MEAS - AI P1/2T: 530.3 MSEC
BH CV ECHO MEAS - AO MAX PG: 34.8 MMHG
BH CV ECHO MEAS - AO ROOT DIAM: 2.5 CM
BH CV ECHO MEAS - AO V2 VTI: 66.2 CM
BH CV ECHO MEAS - AVA(I,D): 1.12 CM2
BH CV ECHO MEAS - EDV(CUBED): 35.9 ML
BH CV ECHO MEAS - EDV(MOD-SP2): 50.8 ML
BH CV ECHO MEAS - EDV(MOD-SP4): 46.4 ML
BH CV ECHO MEAS - EF(MOD-SP2): 55.9 %
BH CV ECHO MEAS - EF(MOD-SP4): 61.2 %
BH CV ECHO MEAS - ESV(CUBED): 13 ML
BH CV ECHO MEAS - ESV(MOD-SP2): 22.4 ML
BH CV ECHO MEAS - ESV(MOD-SP4): 18 ML
BH CV ECHO MEAS - FS: 28.8 %
BH CV ECHO MEAS - IVS/LVPW: 1 CM
BH CV ECHO MEAS - IVSD: 1.4 CM
BH CV ECHO MEAS - LA DIMENSION: 2.2 CM
BH CV ECHO MEAS - LAT PEAK E' VEL: 6.2 CM/SEC
BH CV ECHO MEAS - LV DIASTOLIC VOL/BSA (35-75): 32.4 CM2
BH CV ECHO MEAS - LV MASS(C)D: 159.5 GRAMS
BH CV ECHO MEAS - LV MAX PG: 3.9 MMHG
BH CV ECHO MEAS - LV MEAN PG: 2 MMHG
BH CV ECHO MEAS - LV SYSTOLIC VOL/BSA (12-30): 12.6 CM2
BH CV ECHO MEAS - LV V1 MAX: 99.3 CM/SEC
BH CV ECHO MEAS - LV V1 VTI: 23.6 CM
BH CV ECHO MEAS - LVIDD: 3.3 CM
BH CV ECHO MEAS - LVIDS: 2.35 CM
BH CV ECHO MEAS - LVOT AREA: 3.1 CM2
BH CV ECHO MEAS - LVOT DIAM: 2 CM
BH CV ECHO MEAS - LVPWD: 1.4 CM
BH CV ECHO MEAS - MED PEAK E' VEL: 4.4 CM/SEC
BH CV ECHO MEAS - MV A MAX VEL: 108 CM/SEC
BH CV ECHO MEAS - MV DEC SLOPE: 294 CM/SEC2
BH CV ECHO MEAS - MV DEC TIME: 0.34 SEC
BH CV ECHO MEAS - MV E MAX VEL: 70.7 CM/SEC
BH CV ECHO MEAS - MV E/A: 0.65
BH CV ECHO MEAS - MV MAX PG: 9.4 MMHG
BH CV ECHO MEAS - MV MEAN PG: 2 MMHG
BH CV ECHO MEAS - MV P1/2T: 108.6 MSEC
BH CV ECHO MEAS - MV V2 VTI: 54.6 CM
BH CV ECHO MEAS - MVA(P1/2T): 2.03 CM2
BH CV ECHO MEAS - MVA(VTI): 1.36 CM2
BH CV ECHO MEAS - PA ACC TIME: 0.12 SEC
BH CV ECHO MEAS - SV(LVOT): 74 ML
BH CV ECHO MEAS - SV(MOD-SP2): 28.4 ML
BH CV ECHO MEAS - SV(MOD-SP4): 28.4 ML
BH CV ECHO MEAS - SVI(LVOT): 51.7 ML/M2
BH CV ECHO MEAS - SVI(MOD-SP2): 19.8 ML/M2
BH CV ECHO MEAS - SVI(MOD-SP4): 19.8 ML/M2
BH CV ECHO MEAS - TAPSE (>1.6): 2.8 CM
BH CV ECHO MEAS - TR MAX PG: 35.5 MMHG
BH CV ECHO MEAS - TR MAX VEL: 295.8 CM/SEC
BH CV ECHO MEASUREMENTS AVERAGE E/E' RATIO: 13.34
BH CV VAS BP RIGHT ARM: NORMAL MMHG
BH CV XLRA - RV BASE: 2.8 CM
BH CV XLRA - RV MID: 2.3 CM
BH CV XLRA - TDI S': 15.8 CM/SEC
LEFT ATRIUM VOLUME INDEX: 31.5 ML/M2
LV EF BIPLANE MOD: 60.3 %

## 2025-04-14 ENCOUNTER — RESULTS FOLLOW-UP (OUTPATIENT)
Dept: CARDIOLOGY | Facility: CLINIC | Age: 89
End: 2025-04-14
Payer: MEDICARE

## 2025-04-25 ENCOUNTER — TELEPHONE (OUTPATIENT)
Dept: NEUROLOGY | Facility: CLINIC | Age: 89
End: 2025-04-25
Payer: MEDICARE

## 2025-04-25 NOTE — TELEPHONE ENCOUNTER
Tangela with [facility] called to confirm the Pt Rx ABILIFY dosage.    Confirmed dosage of 10 mg 1 tab daily and faxed over Rx as requested with instructions.    Understanding was verbalized.

## 2025-05-08 ENCOUNTER — OFFICE VISIT (OUTPATIENT)
Dept: CARDIOLOGY | Facility: CLINIC | Age: 89
End: 2025-05-08
Payer: MEDICARE

## 2025-05-08 VITALS
HEART RATE: 58 BPM | SYSTOLIC BLOOD PRESSURE: 142 MMHG | OXYGEN SATURATION: 96 % | WEIGHT: 96.8 LBS | BODY MASS INDEX: 19.01 KG/M2 | DIASTOLIC BLOOD PRESSURE: 70 MMHG | HEIGHT: 60 IN

## 2025-05-08 DIAGNOSIS — Z79.01 CHRONIC ANTICOAGULATION: ICD-10-CM

## 2025-05-08 DIAGNOSIS — I48.0 PAF (PAROXYSMAL ATRIAL FIBRILLATION): Primary | ICD-10-CM

## 2025-05-08 DIAGNOSIS — I48.91 ATRIAL FIBRILLATION, UNSPECIFIED TYPE: ICD-10-CM

## 2025-05-08 DIAGNOSIS — I35.1 NONRHEUMATIC AORTIC VALVE INSUFFICIENCY: ICD-10-CM

## 2025-05-08 PROCEDURE — 93000 ELECTROCARDIOGRAM COMPLETE: CPT | Performed by: INTERNAL MEDICINE

## 2025-05-08 PROCEDURE — 99214 OFFICE O/P EST MOD 30 MIN: CPT | Performed by: INTERNAL MEDICINE

## 2025-05-08 RX ORDER — SPIRONOLACTONE 25 MG/1
25 TABLET ORAL EVERY MORNING
Qty: 90 TABLET | Refills: 1 | Status: SHIPPED | OUTPATIENT
Start: 2025-05-08

## 2025-05-08 RX ORDER — AMIODARONE HYDROCHLORIDE 200 MG/1
200 TABLET ORAL DAILY
Qty: 90 TABLET | Refills: 1 | Status: SHIPPED | OUTPATIENT
Start: 2025-05-08

## 2025-05-08 RX ORDER — LISINOPRIL 10 MG/1
10 TABLET ORAL DAILY
Qty: 90 TABLET | Refills: 1 | Status: SHIPPED | OUTPATIENT
Start: 2025-05-08

## 2025-05-08 RX ORDER — APIXABAN 2.5 MG/1
2.5 TABLET, FILM COATED ORAL EVERY 12 HOURS SCHEDULED
Qty: 180 TABLET | Refills: 1 | Status: SHIPPED | OUTPATIENT
Start: 2025-05-08

## 2025-05-08 NOTE — PROGRESS NOTES
OFFICE VISIT  NOTE  [facility] CARDIOLOGY      Name: Billie Yu    Date: 2025  MRN:  [id number]  :  1935      REFERRING/PRIMARY PROVIDER:  Judit Gaspar PA-C    Complaint: Follow-up for A-fib, palpitations    HPI: Billie Yu is a 89 y.o. female who presents for paroxysmal atrial fibrillation, moderate aortic regurgitation and mild aortic stenosis.  Echo 2024 showed mild aortic stenosis peak velocity 2.7 m/s, mean gradient 17 mmHg.  EF low limits of normal.  Admitted to [facility] 2024 with syncope and A-fib.  History of breast cancer, IRIS, declined testing.  Multiple syncopal episodes over the past several years with multiple Holter monitors in [place] that were negative per patient.  No recent syncope since hospitalization 2024.  Overall doing well denies chest pain palpitations or shortness of breath    Past Medical History:   Diagnosis Date    Atrial fibrillation     Cataract     Dementia     Difficulty walking     HL (hearing loss)     Hypertension     Memory loss     Osteopenia     Scoliosis        Past Surgical History:   Procedure Laterality Date    BLADDER AUGMENTATION      BREAST SURGERY      HIP FRACTURE SURGERY Left     HYSTERECTOMY      MASTECTOMY Right     WRIST ARTHROPLASTY Left        Social History     Socioeconomic History    Marital status:    Tobacco Use    Smoking status: Never    Smokeless tobacco: Never   Vaping Use    Vaping status: Never Used   Substance and Sexual Activity    Alcohol use: Yes     Alcohol/week: 14.0 standard drinks of alcohol     Types: 10 Glasses of wine, 4 Drinks containing 0.5 oz of alcohol per week    Drug use: Never    Sexual activity: Not Currently     Partners: Male     Birth control/protection: Vasectomy       Family History   Problem Relation Age of Onset    Stroke Mother     Hyperlipidemia Father     Hypertension Father     Heart disease Father     Hiatal hernia Father     Heart disease Brother  "    Hyperlipidemia Brother     Hypertension Brother         ROS:   Constitutional no fever,  no weight loss   Skin no rash, no subcutaneous nodules   Otolaryngeal no difficulty swallowing   Cardiovascular See HPI   Pulmonary no cough, no sputum production   Gastrointestinal no constipation, no diarrhea   Genitourinary no dysuria, no hematuria   Hematologic no easy bruisability, no abnormal bleeding   Musculoskeletal no muscle pain   Neurologic no dizziness, no falls         No Known Allergies      Current Outpatient Medications:     acetaminophen (TYLENOL) 325 MG tablet, Take 2 tablets by mouth Every 6 (Six) Hours As Needed for Mild Pain., Disp: , Rfl:     amiodarone (PACERONE) 200 MG tablet, Take 1 tablet by mouth Daily., Disp: 90 tablet, Rfl: 3    ARIPiprazole (ABILIFY) 10 MG tablet, Take 1 tablet by mouth Daily., Disp: 30 tablet, Rfl: 6    donepezil (Aricept) 10 MG tablet, Take 1 tablet by mouth Every Night., Disp: 90 tablet, Rfl: 3    Eliquis 2.5 MG tablet tablet, Take 1 tablet by mouth Every 12 (Twelve) Hours., Disp: 180 tablet, Rfl: 3    famotidine (PEPCID) 20 MG tablet, Take 1 tablet by mouth 2 (Two) Times a Day Before Meals., Disp: , Rfl:     guaifenesin (ROBITUSSIN) 100 MG/5ML liquid, Take 10 mL by mouth Every 4 (Four) Hours As Needed for Cough., Disp: , Rfl:     lactobacillus acidophilus (RISAQUAD) capsule capsule, Take 1 capsule by mouth Daily., Disp: , Rfl:     lisinopril (PRINIVIL,ZESTRIL) 10 MG tablet, Take 1 tablet by mouth Daily., Disp: , Rfl:     spironolactone (ALDACTONE) 25 MG tablet, Take 1 tablet by mouth Every Morning., Disp: 90 tablet, Rfl: 0    Vitals:    05/08/25 1327   BP: 142/70   BP Location: Right arm   Patient Position: Sitting   Cuff Size: Adult   Pulse: 58   SpO2: 96%   Weight: 43.9 kg (96 lb 12.8 oz)   Height: 152.4 cm (60\")     Body mass index is 18.9 kg/m².    PHYSICAL EXAM:    General Appearance:   well developed  well nourished  HENT:   oropharynx moist  lips not " cyanotic  Neck:  thyroid not enlarged  supple  Respiratory:  no respiratory distress  normal breath sounds  no rales  Cardiovascular:  no jugular venous distention  regular rhythm  apical impulse normal  S1 normal, S2 normal  no S3, no S4   3/6 systolic murmur  no rub, no thrill  carotid pulses normal; no bruit  lower extremity edema: none      Musculoskeletal:  no clubbing of fingers.   normocephalic, head atraumatic  Skin:   warm, dry  Psychiatric:  judgement and insight appropriate  normal mood and affect    RESULTS:     ECG 12 Lead    Date/Time: 5/8/2025 2:19 PM  Performed by: Jay Clemente MD    Authorized by: Jay Clemente MD  Comparison: compared with previous ECG from 10/23/2024  Similar to previous ECG  Rhythm: sinus bradycardia  Rate: bradycardic  BPM: 58  Conduction: left anterior fascicular block  QRS axis: normal    Clinical impression: non-specific ECG          Results for orders placed during the hospital encounter of 04/07/25    Adult Transthoracic Echo Complete W/ Cont if Necessary Per Protocol    Interpretation Summary    Left ventricular systolic function is normal. Calculated left ventricular EF = 60.3% Left ventricular ejection fraction appears to be 56 - 60%.    Left ventricular wall thickness is consistent with mild concentric hypertrophy.    Left ventricular diastolic function is consistent with (grade Ia w/high LAP) impaired relaxation.    The left atrial cavity is mildly dilated.    Moderate aortic valve regurgitation is present.    Mild aortic valve stenosis is present.    Peak velocity of the flow distal to the aortic valve is 294.4 cm/s. Aortic valve mean pressure gradient is 18 mmHg.    There are myxomatous changes of the mitral valve apparatus present.    Estimated right ventricular systolic pressure from tricuspid regurgitation is mildly elevated (35-45 mmHg).    Essentially unchanged compared to 6/2024        Labs:  Lab Results   Component Value Date    CHOL 186 10/22/2024     "TRIG 66 10/22/2024    HDL 84 (H) 10/22/2024    LDL 90 10/22/2024    AST 20 10/23/2024    ALT 16 10/23/2024     Lab Results   Component Value Date    HGBA1C 5.70 (H) 10/22/2024     No components found for: \"CREATINININE\"  No results found for: \"EGFRIFNONA\"    Most recent PCP note, imaging tests, and labs reviewed.    ASSESSMENT:  Problem List Items Addressed This Visit       PAF (paroxysmal atrial fibrillation) - Primary    Nonrheumatic aortic valve insufficiency       PLAN:    1.  Syncope:  I recommend loop recorder if she has another event she will call us  Increase hydration  Cautious with diuretics.    2.  Mild aortic stenosis with moderate aortic regurgitation:  Reviewed 4/2025, stable mild aortic stenosis with moderate AI, no change from previous year  Repeat echo around 4/2027  Relatively asymptomatic, I do not feel that is contributing to syncope.    3.  Paroxysmal atrial fibrillation:  Continue amiodarone she has been on it for several years and tolerating it well  Repeat CMP, thyroid function studies every 6 months and eye exam and chest x-ray yearly  Labs reviewed, stable, refill today.  Continue Eliquis refill today  If she has recurrent syncope and falls may need to consider Watchman device    Advance Care Planning   ACP discussion was held with the patient during this visit. Patient has an advance directive (not in EMR), copy requested.         Return to clinic in 12 months, or sooner as needed.    Thank you for the opportunity to share in the care of your patient; please do not hesitate to call me with any questions.     Jay Clemente MD, [facility], [facility]  Office: [phone]  [facility] [place] [address]    05/08/25    "

## 2025-05-14 ENCOUNTER — TELEPHONE (OUTPATIENT)
Dept: NEUROLOGY | Facility: CLINIC | Age: 89
End: 2025-05-14
Payer: MEDICARE

## 2025-08-11 ENCOUNTER — TELEPHONE (OUTPATIENT)
Facility: HOSPITAL | Age: 89
End: 2025-08-11
Payer: MEDICARE

## 2025-08-13 ENCOUNTER — TELEPHONE (OUTPATIENT)
Facility: HOSPITAL | Age: 89
End: 2025-08-13
Payer: MEDICARE